# Patient Record
Sex: FEMALE | Race: BLACK OR AFRICAN AMERICAN | NOT HISPANIC OR LATINO | Employment: UNEMPLOYED | URBAN - METROPOLITAN AREA
[De-identification: names, ages, dates, MRNs, and addresses within clinical notes are randomized per-mention and may not be internally consistent; named-entity substitution may affect disease eponyms.]

---
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
## 2021-05-07 ENCOUNTER — HOSPITAL ENCOUNTER (EMERGENCY)
Facility: HOSPITAL | Age: 21
Discharge: HOME | End: 2021-05-07
Attending: EMERGENCY MEDICINE | Admitting: EMERGENCY MEDICINE
Payer: COMMERCIAL

## 2021-05-07 ENCOUNTER — APPOINTMENT (EMERGENCY)
Dept: RADIOLOGY | Facility: HOSPITAL | Age: 21
End: 2021-05-07
Attending: EMERGENCY MEDICINE
Payer: COMMERCIAL

## 2021-05-07 ENCOUNTER — OFFICE VISIT (OUTPATIENT)
Dept: FAMILY MEDICINE CLINIC | Facility: CLINIC | Age: 21
End: 2021-05-07
Payer: COMMERCIAL

## 2021-05-07 VITALS
SYSTOLIC BLOOD PRESSURE: 119 MMHG | DIASTOLIC BLOOD PRESSURE: 58 MMHG | TEMPERATURE: 98.8 F | HEART RATE: 87 BPM | RESPIRATION RATE: 16 BRPM | OXYGEN SATURATION: 100 %

## 2021-05-07 VITALS
BODY MASS INDEX: 18.61 KG/M2 | TEMPERATURE: 97 F | SYSTOLIC BLOOD PRESSURE: 118 MMHG | HEART RATE: 70 BPM | RESPIRATION RATE: 14 BRPM | HEIGHT: 64 IN | DIASTOLIC BLOOD PRESSURE: 70 MMHG | OXYGEN SATURATION: 96 % | WEIGHT: 109 LBS

## 2021-05-07 DIAGNOSIS — Z00.00 ANNUAL PHYSICAL EXAM: Primary | ICD-10-CM

## 2021-05-07 DIAGNOSIS — D64.9 ANEMIA: ICD-10-CM

## 2021-05-07 DIAGNOSIS — R07.81 RIB PAIN ON RIGHT SIDE: Primary | ICD-10-CM

## 2021-05-07 DIAGNOSIS — Z11.3 SCREENING FOR STD (SEXUALLY TRANSMITTED DISEASE): ICD-10-CM

## 2021-05-07 DIAGNOSIS — R07.81 RIB PAIN ON RIGHT SIDE: ICD-10-CM

## 2021-05-07 LAB
ALBUMIN SERPL BCP-MCNC: 3.6 G/DL (ref 3.5–5)
ALP SERPL-CCNC: 49 U/L (ref 46–116)
ALT SERPL W P-5'-P-CCNC: 28 U/L (ref 12–78)
ANION GAP SERPL CALCULATED.3IONS-SCNC: 10 MMOL/L (ref 4–13)
AST SERPL W P-5'-P-CCNC: 32 U/L (ref 5–45)
BASOPHILS # BLD AUTO: 0.06 THOUSANDS/ΜL (ref 0–0.1)
BASOPHILS NFR BLD AUTO: 1 % (ref 0–1)
BILIRUB SERPL-MCNC: 0.24 MG/DL (ref 0.2–1)
BUN SERPL-MCNC: 10 MG/DL (ref 5–25)
CALCIUM SERPL-MCNC: 8.5 MG/DL (ref 8.3–10.1)
CHLORIDE SERPL-SCNC: 108 MMOL/L (ref 100–108)
CO2 SERPL-SCNC: 25 MMOL/L (ref 21–32)
CREAT SERPL-MCNC: 0.75 MG/DL (ref 0.6–1.3)
D DIMER PPP FEU-MCNC: <0.27 UG/ML FEU
EOSINOPHIL # BLD AUTO: 0.28 THOUSAND/ΜL (ref 0–0.61)
EOSINOPHIL NFR BLD AUTO: 5 % (ref 0–6)
ERYTHROCYTE [DISTWIDTH] IN BLOOD BY AUTOMATED COUNT: 19.4 % (ref 11.6–15.1)
GFR SERPL CREATININE-BSD FRML MDRD: 133 ML/MIN/1.73SQ M
GLUCOSE SERPL-MCNC: 89 MG/DL (ref 65–140)
HCT VFR BLD AUTO: 31.1 % (ref 34.8–46.1)
HGB BLD-MCNC: 8.5 G/DL (ref 11.5–15.4)
IMM GRANULOCYTES # BLD AUTO: 0.01 THOUSAND/UL (ref 0–0.2)
IMM GRANULOCYTES NFR BLD AUTO: 0 % (ref 0–2)
LYMPHOCYTES # BLD AUTO: 2.32 THOUSANDS/ΜL (ref 0.6–4.47)
LYMPHOCYTES NFR BLD AUTO: 41 % (ref 14–44)
MCH RBC QN AUTO: 18.6 PG (ref 26.8–34.3)
MCHC RBC AUTO-ENTMCNC: 27.3 G/DL (ref 31.4–37.4)
MCV RBC AUTO: 68 FL (ref 82–98)
MONOCYTES # BLD AUTO: 0.78 THOUSAND/ΜL (ref 0.17–1.22)
MONOCYTES NFR BLD AUTO: 14 % (ref 4–12)
NEUTROPHILS # BLD AUTO: 2.17 THOUSANDS/ΜL (ref 1.85–7.62)
NEUTS SEG NFR BLD AUTO: 39 % (ref 43–75)
NRBC BLD AUTO-RTO: 0 /100 WBCS
PLATELET # BLD AUTO: 509 THOUSANDS/UL (ref 149–390)
PMV BLD AUTO: 9.7 FL (ref 8.9–12.7)
POTASSIUM SERPL-SCNC: 4.4 MMOL/L (ref 3.5–5.3)
PROT SERPL-MCNC: 7.2 G/DL (ref 6.4–8.2)
RBC # BLD AUTO: 4.56 MILLION/UL (ref 3.81–5.12)
SODIUM SERPL-SCNC: 143 MMOL/L (ref 136–145)
WBC # BLD AUTO: 5.62 THOUSAND/UL (ref 4.31–10.16)

## 2021-05-07 PROCEDURE — 80053 COMPREHEN METABOLIC PANEL: CPT | Performed by: EMERGENCY MEDICINE

## 2021-05-07 PROCEDURE — 85025 COMPLETE CBC W/AUTO DIFF WBC: CPT | Performed by: EMERGENCY MEDICINE

## 2021-05-07 PROCEDURE — 85379 FIBRIN DEGRADATION QUANT: CPT | Performed by: EMERGENCY MEDICINE

## 2021-05-07 PROCEDURE — 71101 X-RAY EXAM UNILAT RIBS/CHEST: CPT

## 2021-05-07 PROCEDURE — 99395 PREV VISIT EST AGE 18-39: CPT | Performed by: FAMILY MEDICINE

## 2021-05-07 PROCEDURE — 99285 EMERGENCY DEPT VISIT HI MDM: CPT | Performed by: EMERGENCY MEDICINE

## 2021-05-07 PROCEDURE — 3008F BODY MASS INDEX DOCD: CPT | Performed by: FAMILY MEDICINE

## 2021-05-07 PROCEDURE — 99284 EMERGENCY DEPT VISIT MOD MDM: CPT

## 2021-05-07 PROCEDURE — 1036F TOBACCO NON-USER: CPT | Performed by: FAMILY MEDICINE

## 2021-05-07 PROCEDURE — 36415 COLL VENOUS BLD VENIPUNCTURE: CPT | Performed by: EMERGENCY MEDICINE

## 2021-05-07 RX ORDER — NAPROXEN 500 MG/1
500 TABLET ORAL ONCE
Status: COMPLETED | OUTPATIENT
Start: 2021-05-07 | End: 2021-05-07

## 2021-05-07 RX ORDER — NAPROXEN 500 MG/1
500 TABLET ORAL 2 TIMES DAILY WITH MEALS
Qty: 10 TABLET | Refills: 0 | Status: SHIPPED | OUTPATIENT
Start: 2021-05-07 | End: 2022-06-03 | Stop reason: ALTCHOICE

## 2021-05-07 RX ORDER — FERROUS SULFATE 325(65) MG
325 TABLET ORAL DAILY
Qty: 30 TABLET | Refills: 0 | Status: SHIPPED | OUTPATIENT
Start: 2021-05-07 | End: 2022-06-03 | Stop reason: ALTCHOICE

## 2021-05-07 RX ORDER — FERROUS SULFATE 325(65) MG
325 TABLET ORAL
Status: DISCONTINUED | OUTPATIENT
Start: 2021-05-08 | End: 2021-05-07 | Stop reason: HOSPADM

## 2021-05-07 RX ADMIN — FERROUS SULFATE TAB 325 MG (65 MG ELEMENTAL FE) 325 MG: 325 (65 FE) TAB at 21:37

## 2021-05-07 RX ADMIN — NAPROXEN 500 MG: 500 TABLET ORAL at 21:37

## 2021-05-07 NOTE — PROGRESS NOTES
[address] [facility]    NAME: Ambika Fong  AGE: 21 y o  SEX: female  : 2000     DATE: 2021      Assessment and Plan:     Problem List Items Addressed This Visit     None      Visit Diagnoses     Annual physical exam    -  Primary    Screening for STD (sexually transmitted disease)        Relevant Orders    RPR    HIV 1/2 Antigen/Antibody (4th Generation) w Reflex [facility]    Chlamydia/GC amplified DNA by PCR    Rib pain on right side        Relevant Orders    XR ribs right w pa chest min 3 views          Immunizations and preventive care screenings were discussed with patient today  Appropriate education was printed on patient's after visit summary  Counseling:  Alcohol/drug use: discussed moderation in alcohol intake, the recommendations for healthy alcohol use, and avoidance of illicit drug use  Dental Health: discussed importance of regular tooth brushing, flossing, and dental visits  Injury prevention: discussed safety/seat belts, safety helmets, smoke detectors, carbon dioxide detectors, and smoking near bedding or upholstery  Sexual health: discussed sexually transmitted diseases, partner selection, use of condoms, avoidance of unintended pregnancy, and contraceptive alternatives  · Exercise: the importance of regular exercise/physical activity was discussed  Recommend exercise 3-5 times per week for at least 30 minutes  No follow-ups on file  Chief Complaint:     Chief Complaint   Patient presents with    Annual Exam     Has not been seen in office for a few years  No concerns  History of Present Illness:     Adult Annual Physical   Patient here for a comprehensive physical exam  The patient reports having mahmood  Diet and Physical Activity  · Diet/Nutrition: well balanced diet  · Exercise: no formal exercise        Depression Screening  PHQ-9 Depression Screening    PHQ-9:   Frequency of the following problems over the past two weeks:           General Health  · Sleep: gets 4-6 hours of sleep on average  · Hearing: normal - bilateral   · Vision: vision problems: wears glasses      · Dental: brushes teeth twice daily and does not floss  Work-   [employer]  · Last menstrual period: 5/1/21  · Sexual Active- bisexual  · Contraceptive method: none  · History of STDs?: no      Review of Systems:     Review of Systems   Constitutional: Negative for chills, fatigue and fever  HENT: Negative for congestion, ear pain and sore throat  Eyes: Negative for pain and visual disturbance  Respiratory: Negative for cough and shortness of breath  Cardiovascular: Negative for chest pain and palpitations  Gastrointestinal: Negative for abdominal pain and vomiting  Genitourinary: Negative for dysuria and hematuria  Musculoskeletal: Negative for arthralgias and back pain  Skin: Negative for color change and rash  Neurological: Negative for seizures and syncope  All other systems reviewed and are negative  Past Medical History:     Past Medical History:   Diagnosis Date    Asthma       Past Surgical History:     History reviewed  No pertinent surgical history     Social History:        Social History     Socioeconomic History    Marital status: Single     Spouse name: None    Number of children: None    Years of education: None    Highest education level: None   Occupational History    None   Social Needs    Financial resource strain: None    Food insecurity     Worry: None     Inability: None    Transportation needs     Medical: None     Non-medical: None   Tobacco Use    Smoking status: Never Smoker    Smokeless tobacco: Never Used   Substance and Sexual Activity    Alcohol use: No    Drug use: No    Sexual activity: Yes   Lifestyle    Physical activity     Days per week: None     Minutes per session: None    Stress: None   Relationships    Social connections     Talks on phone: None Gets together: None     Attends Gnosticism service: None     Active member of club or organization: None     Attends meetings of clubs or organizations: None     Relationship status: None    Intimate partner violence     Fear of current or ex partner: None     Emotionally abused: None     Physically abused: None     Forced sexual activity: None   Other Topics Concern    None   Social History Narrative    None      Family History:     History reviewed  No pertinent family history  Current Medications:     Current Outpatient Medications   Medication Sig Dispense Refill    albuterol (PROVENTIL HFA,VENTOLIN HFA) 90 mcg/act inhaler Inhale 2 puffs every 6 (six) hours as needed for wheezing or shortness of breath  (Patient not taking: Reported on 5/7/2021) 1 Inhaler 0     No current facility-administered medications for this visit  Allergies: Allergies   Allergen Reactions    Other      seasonal      Physical Exam:     /70 (BP Location: Left arm, Patient Position: Sitting, Cuff Size: Standard)   Pulse 70   Temp (!) 97 °F (36 1 °C) (Tympanic)   Resp 14   Ht 5' 4" (1 626 m)   Wt 49 4 kg (109 lb)   LMP 05/01/2021 (LMP Unknown)   SpO2 96%   BMI 18 71 kg/m²     Physical Exam  Vitals signs and nursing note reviewed  Constitutional:       General: She is not in acute distress  Appearance: She is well-developed  HENT:      Head: Normocephalic and atraumatic  Right Ear: Tympanic membrane, ear canal and external ear normal       Left Ear: Tympanic membrane, ear canal and external ear normal       Nose: Nose normal       Mouth/Throat:      Mouth: Mucous membranes are moist    Eyes:      Extraocular Movements: Extraocular movements intact  Conjunctiva/sclera: Conjunctivae normal       Pupils: Pupils are equal, round, and reactive to light  Neck:      Musculoskeletal: Neck supple  Cardiovascular:      Rate and Rhythm: Normal rate and regular rhythm  Heart sounds: No murmur  Pulmonary:      Effort: Pulmonary effort is normal  No respiratory distress  Breath sounds: Normal breath sounds  Abdominal:      Palpations: Abdomen is soft  Tenderness: There is no abdominal tenderness  Musculoskeletal: Normal range of motion  Comments: Tenderness to palpation at rib2 substernal    Skin:     General: Skin is warm and dry  Capillary Refill: Capillary refill takes less than 2 seconds  Neurological:      General: No focal deficit present  Mental Status: She is alert and oriented to person, place, and time     Psychiatric:         Mood and Affect: Mood normal          Behavior: Behavior normal             Lindsey Renee DO   [address]

## 2021-05-07 NOTE — ED PROVIDER NOTES
History  Chief Complaint   Patient presents with    Rib Pain     c/o painful lump to anterior chest for a couple of weeks  sent in by [facility] for xray  states sometimes hard to breathe with it     22 yo female c/o lump of right anterior chest wall  She says she noticed it one week ago  The area is painful   + associated sob with movement/deep breathing  Sent by [facility] for [facility] she says  No fever, cough, vomiting, diarrhea  No trauma  History provided by:  Patient   used: No        Prior to Admission Medications   Prescriptions Last Dose Informant Patient Reported? Taking? albuterol (PROVENTIL HFA,VENTOLIN HFA) 90 mcg/act inhaler   No No   Sig: Inhale 2 puffs every 6 (six) hours as needed for wheezing or shortness of breath  Patient not taking: Reported on 5/7/2021      Facility-Administered Medications: None       Past Medical History:   Diagnosis Date    Asthma        History reviewed  No pertinent surgical history  History reviewed  No pertinent family history  I have reviewed and agree with the history as documented  E-Cigarette/Vaping    E-Cigarette Use Never User      E-Cigarette/Vaping Substances     Social History     Tobacco Use    Smoking status: Never Smoker    Smokeless tobacco: Never Used   Substance Use Topics    Alcohol use: No    Drug use: No       Review of Systems   Constitutional: Negative  Negative for fever  HENT: Negative  Eyes: Negative  Respiratory: Positive for shortness of breath  Negative for cough  Cardiovascular: Positive for chest pain  Gastrointestinal: Negative  Negative for abdominal pain, diarrhea, nausea and vomiting  Genitourinary: Negative  Negative for dysuria and flank pain  Musculoskeletal: Negative  Negative for back pain and myalgias  Skin: Negative  Negative for rash  Neurological: Negative  Negative for dizziness and headaches  Hematological: Does not bruise/bleed easily     Psychiatric/Behavioral: Negative  All other systems reviewed and are negative  Physical Exam  Physical Exam  Vitals signs and nursing note reviewed  Constitutional:       General: She is not in acute distress  Appearance: She is well-developed  She is not ill-appearing, toxic-appearing or diaphoretic  HENT:      Head: Normocephalic and atraumatic  Eyes:      Conjunctiva/sclera: Conjunctivae normal    Neck:      Musculoskeletal: Normal range of motion and neck supple  Cardiovascular:      Rate and Rhythm: Normal rate and regular rhythm  Heart sounds: Normal heart sounds  No murmur  Pulmonary:      Effort: Pulmonary effort is normal  No respiratory distress  Breath sounds: Normal breath sounds  Comments: Right upper anterior chest wall about 2 fingerwidths below clavicle there is a firm lump over the rib, not red/warm/abscessed, the area is tender to palp, there is a definite asymmetry to the left side  Chest:      Chest wall: Tenderness present  Abdominal:      General: Bowel sounds are normal  There is no distension  Palpations: Abdomen is soft  Tenderness: There is no abdominal tenderness  Musculoskeletal: Normal range of motion  General: No tenderness or deformity  Right lower leg: No edema  Left lower leg: No edema  Skin:     General: Skin is warm and dry  Coloration: Skin is not pale  Findings: No rash  Neurological:      General: No focal deficit present  Mental Status: She is alert and oriented to person, place, and time  Cranial Nerves: No cranial nerve deficit     Psychiatric:         Mood and Affect: Mood normal          Behavior: Behavior normal          Vital Signs  ED Triage Vitals [05/07/21 1748]   Temperature Pulse Respirations Blood Pressure SpO2   98 8 °F (37 1 °C) 87 16 119/58 100 %      Temp Source Heart Rate Source Patient Position - Orthostatic VS BP Location FiO2 (%)   Tympanic Monitor Sitting Right arm --      Pain Score No Pain           Vitals:    05/07/21 1748   BP: 119/58   Pulse: 87   Patient Position - Orthostatic VS: Sitting         Visual Acuity      ED Medications  Medications   naproxen (NAPROSYN) tablet 500 mg (has no administration in time range)   ferrous sulfate tablet 325 mg (has no administration in time range)       Diagnostic Studies  Results Reviewed     Procedure Component Value Units Date/Time    D-Dimer [[id number]]  (Normal) Collected: 05/07/21 1923    Lab Status: Final result Specimen: Blood from Arm, Left Updated: 05/07/21 1943     D-Dimer, Quant <0 27 ug/ml [facility]     Comprehensive metabolic panel [[id number]] Collected: 05/07/21 1850    Lab Status: Final result Specimen: Blood from Arm, Left Updated: 05/07/21 1909     Sodium 143 mmol/L      Potassium 4 4 mmol/L      Chloride 108 mmol/L      CO2 25 mmol/L      ANION GAP 10 mmol/L      BUN 10 mg/dL      Creatinine 0 75 mg/dL      Glucose 89 mg/dL      Calcium 8 5 mg/dL      AST 32 U/L      ALT 28 U/L      Alkaline Phosphatase 49 U/L      Total Protein 7 2 g/dL      Albumin 3 6 g/dL      Total Bilirubin 0 24 mg/dL      eGFR 133 ml/min/1 73sq m     Narrative:      [place] guidelines for Chronic Kidney Disease (CKD):     Stage 1 with normal or high GFR (GFR > 90 mL/min/1 73 square meters)    Stage 2 Mild CKD (GFR = 60-89 mL/min/1 73 square meters)    Stage 3A Moderate CKD (GFR = 45-59 mL/min/1 73 square meters)    Stage 3B Moderate CKD (GFR = 30-44 mL/min/1 73 square meters)    Stage 4 Severe CKD (GFR = 15-29 mL/min/1 73 square meters)    Stage 5 End Stage CKD (GFR <15 mL/min/1 73 square meters)  Note: GFR calculation is accurate only with a steady state creatinine    CBC and differential [[id number]]  (Abnormal) Collected: 05/07/21 1850    Lab Status: Final result Specimen: Blood from Arm, Left Updated: 05/07/21 1854     WBC 5 62 Thousand/uL      RBC 4 56 Million/uL      Hemoglobin 8 5 g/dL      Hematocrit 31 1 %      MCV 68 fL MCH 18 6 pg      MCHC 27 3 g/dL      RDW 19 4 %      MPV 9 7 fL      Platelets 646 Thousands/uL      nRBC 0 /100 WBCs      Neutrophils Relative 39 %      Immat GRANS % 0 %      Lymphocytes Relative 41 %      Monocytes Relative 14 %      Eosinophils Relative 5 %      Basophils Relative 1 %      Neutrophils Absolute 2 17 Thousands/µL      Immature Grans Absolute 0 01 Thousand/uL      Lymphocytes Absolute 2 32 Thousands/µL      Monocytes Absolute 0 78 Thousand/µL      Eosinophils Absolute 0 28 Thousand/µL      Basophils Absolute 0 06 Thousands/µL                  XR ribs with pa chest min 3 views RIGHT   ED Interpretation by Kevyn Felton MD (13/35 1166)   negative                 Procedures  Procedures         ED Course                                           MDM  Number of Diagnoses or Management Options  Anemia:   Rib pain on right side:   Diagnosis management comments: Labs show anemia which is new from bloodwork a couple years ago  Pt  Advised and prescribed iron supplement  Pt  Denies bleeding anywhere, no heavy periods, blood in stool  Will try naprosyn and heating pad for swollen painful area over rib  Disposition  Final diagnoses:   Rib pain on right side   Anemia     Time reflects when diagnosis was documented in both MDM as applicable and the Disposition within this note     Time User Action Codes Description Comment    5/3/8968  7:70 PM Em PATHAK Add [B10 46] Rib pain on right side     0/3/2500  2:86 PM Rekha Ly Add [M63 9] Anemia       ED Disposition     ED Disposition Condition Date/Time Comment    Discharge Stable Fri May 7, 2021  8:58 PM Xochitl Muniz discharge to home/self care              Follow-up Information    None         Patient's Medications   Discharge Prescriptions    FERROUS SULFATE 325 (65 FE) MG TABLET    Take 1 tablet (325 mg total) by mouth daily       Start Date: 5/7/2021  End Date: --       Order Dose: 325 mg       Quantity: 30 tablet    Refills: 0 NAPROXEN (NAPROSYN) 500 MG TABLET    Take 1 tablet (500 mg total) by mouth 2 (two) times a day with meals       Start Date: 5/7/2021  End Date: --       Order Dose: 500 mg       Quantity: 10 tablet    Refills: 0     No discharge procedures on file      PDMP Review     None          ED Provider  Electronically Signed by           Carlos Rizzo MD  87/91/40 1980

## 2021-05-07 NOTE — PATIENT INSTRUCTIONS
Call [facility] for EYE Exam    [facility] [address], [place] [address]  [phone]    Call Radiology regarding Xray  PCP will call back with results

## 2021-05-08 LAB
C TRACH RRNA SPEC QL NAA+PROBE: NEGATIVE
N GONORRHOEA RRNA SPEC QL NAA+PROBE: NEGATIVE

## 2021-05-08 NOTE — DISCHARGE INSTRUCTIONS
Your blood work shows anemia  You need to take the iron supplement as prescribed and follow up with [facility] again next week - the iron can cause constipation so keep hydrated and use an over the counter stool softener if needed  The xrays of your lungs and ribs are normal   There may be inflammation of the rib cartilage and/or muscle  Use a heating pad off and on and take the naprosyn as prescribed

## 2021-06-03 ENCOUNTER — CLINICAL SUPPORT (OUTPATIENT)
Dept: FAMILY MEDICINE CLINIC | Facility: CLINIC | Age: 21
End: 2021-06-03
Payer: COMMERCIAL

## 2021-06-03 DIAGNOSIS — N91.2 AMENORRHEA: Primary | ICD-10-CM

## 2021-06-03 LAB — SL AMB POCT URINE HCG: POSITIVE

## 2021-06-03 PROCEDURE — 81025 URINE PREGNANCY TEST: CPT

## 2021-06-03 NOTE — PATIENT INSTRUCTIONS
OB intake form completed and given to [facility], copy placed for scanning as well  Patient advised to make an appointment for initial OB visit and that Milagro will reach out to her for intake  Patient verbalized understanding

## 2021-06-04 ENCOUNTER — HOSPITAL ENCOUNTER (EMERGENCY)
Facility: HOSPITAL | Age: 21
Discharge: HOME | End: 2021-06-05
Attending: EMERGENCY MEDICINE | Admitting: EMERGENCY MEDICINE
Payer: COMMERCIAL

## 2021-06-04 VITALS
OXYGEN SATURATION: 99 % | RESPIRATION RATE: 16 BRPM | DIASTOLIC BLOOD PRESSURE: 58 MMHG | HEART RATE: 87 BPM | WEIGHT: 110 LBS | SYSTOLIC BLOOD PRESSURE: 117 MMHG | TEMPERATURE: 99.1 F | BODY MASS INDEX: 18.88 KG/M2

## 2021-06-04 DIAGNOSIS — Z34.90 EARLY STAGE OF PREGNANCY: Primary | ICD-10-CM

## 2021-06-04 LAB
ABO GROUP BLD: NORMAL
ALBUMIN SERPL BCP-MCNC: 4.1 G/DL (ref 3.5–5)
ALP SERPL-CCNC: 71 U/L (ref 46–116)
ALT SERPL W P-5'-P-CCNC: 104 U/L (ref 12–78)
ANION GAP SERPL CALCULATED.3IONS-SCNC: 9 MMOL/L (ref 4–13)
APTT PPP: 35 SECONDS (ref 23–37)
AST SERPL W P-5'-P-CCNC: 60 U/L (ref 5–45)
B-HCG SERPL-ACNC: 919 MIU/ML
BACTERIA UR QL AUTO: ABNORMAL /HPF
BASOPHILS # BLD AUTO: 0.1 THOUSANDS/ΜL (ref 0–0.1)
BASOPHILS NFR BLD AUTO: 1 % (ref 0–1)
BILIRUB SERPL-MCNC: 0.25 MG/DL (ref 0.2–1)
BILIRUB UR QL STRIP: NEGATIVE
BUN SERPL-MCNC: 10 MG/DL (ref 5–25)
CALCIUM SERPL-MCNC: 9.1 MG/DL (ref 8.3–10.1)
CHLORIDE SERPL-SCNC: 104 MMOL/L (ref 100–108)
CLARITY UR: ABNORMAL
CO2 SERPL-SCNC: 27 MMOL/L (ref 21–32)
COLOR UR: YELLOW
CREAT SERPL-MCNC: 0.81 MG/DL (ref 0.6–1.3)
EOSINOPHIL # BLD AUTO: 2.08 THOUSAND/ΜL (ref 0–0.61)
EOSINOPHIL NFR BLD AUTO: 23 % (ref 0–6)
ERYTHROCYTE [DISTWIDTH] IN BLOOD BY AUTOMATED COUNT: 26.2 % (ref 11.6–15.1)
EXT PREG TEST URINE: POSITIVE
EXT. CONTROL ED NAV: NORMAL
GFR SERPL CREATININE-BSD FRML MDRD: 121 ML/MIN/1.73SQ M
GLUCOSE SERPL-MCNC: 78 MG/DL (ref 65–140)
GLUCOSE UR STRIP-MCNC: NEGATIVE MG/DL
HCT VFR BLD AUTO: 35.7 % (ref 34.8–46.1)
HGB BLD-MCNC: 10.3 G/DL (ref 11.5–15.4)
HGB UR QL STRIP.AUTO: NEGATIVE
IMM GRANULOCYTES # BLD AUTO: 0.02 THOUSAND/UL (ref 0–0.2)
IMM GRANULOCYTES NFR BLD AUTO: 0 % (ref 0–2)
INR PPP: 1 (ref 0.84–1.19)
KETONES UR STRIP-MCNC: NEGATIVE MG/DL
LEUKOCYTE ESTERASE UR QL STRIP: ABNORMAL
LYMPHOCYTES # BLD AUTO: 3.02 THOUSANDS/ΜL (ref 0.6–4.47)
LYMPHOCYTES NFR BLD AUTO: 34 % (ref 14–44)
MCH RBC QN AUTO: 20.2 PG (ref 26.8–34.3)
MCHC RBC AUTO-ENTMCNC: 28.9 G/DL (ref 31.4–37.4)
MCV RBC AUTO: 70 FL (ref 82–98)
MONOCYTES # BLD AUTO: 0.82 THOUSAND/ΜL (ref 0.17–1.22)
MONOCYTES NFR BLD AUTO: 9 % (ref 4–12)
MUCOUS THREADS UR QL AUTO: ABNORMAL
NEUTROPHILS # BLD AUTO: 2.95 THOUSANDS/ΜL (ref 1.85–7.62)
NEUTS SEG NFR BLD AUTO: 33 % (ref 43–75)
NITRITE UR QL STRIP: NEGATIVE
NON-SQ EPI CELLS URNS QL MICRO: ABNORMAL /HPF
NRBC BLD AUTO-RTO: 0 /100 WBCS
PH UR STRIP.AUTO: 6 [PH]
PLATELET # BLD AUTO: 515 THOUSANDS/UL (ref 149–390)
PMV BLD AUTO: 9.3 FL (ref 8.9–12.7)
POTASSIUM SERPL-SCNC: 3.7 MMOL/L (ref 3.5–5.3)
PROT SERPL-MCNC: 8.3 G/DL (ref 6.4–8.2)
PROT UR STRIP-MCNC: NEGATIVE MG/DL
PROTHROMBIN TIME: 13.1 SECONDS (ref 11.6–14.5)
RBC # BLD AUTO: 5.09 MILLION/UL (ref 3.81–5.12)
RBC #/AREA URNS AUTO: ABNORMAL /HPF
RH BLD: POSITIVE
SODIUM SERPL-SCNC: 140 MMOL/L (ref 136–145)
SP GR UR STRIP.AUTO: >=1.03 (ref 1–1.03)
UROBILINOGEN UR QL STRIP.AUTO: 4 E.U./DL
WBC # BLD AUTO: 8.99 THOUSAND/UL (ref 4.31–10.16)
WBC #/AREA URNS AUTO: ABNORMAL /HPF

## 2021-06-04 PROCEDURE — 81001 URINALYSIS AUTO W/SCOPE: CPT | Performed by: EMERGENCY MEDICINE

## 2021-06-04 PROCEDURE — 99284 EMERGENCY DEPT VISIT MOD MDM: CPT | Performed by: EMERGENCY MEDICINE

## 2021-06-04 PROCEDURE — 36415 COLL VENOUS BLD VENIPUNCTURE: CPT | Performed by: EMERGENCY MEDICINE

## 2021-06-04 PROCEDURE — 99284 EMERGENCY DEPT VISIT MOD MDM: CPT

## 2021-06-04 PROCEDURE — 85610 PROTHROMBIN TIME: CPT | Performed by: EMERGENCY MEDICINE

## 2021-06-04 PROCEDURE — 84702 CHORIONIC GONADOTROPIN TEST: CPT | Performed by: EMERGENCY MEDICINE

## 2021-06-04 PROCEDURE — 87086 URINE CULTURE/COLONY COUNT: CPT | Performed by: EMERGENCY MEDICINE

## 2021-06-04 PROCEDURE — 81025 URINE PREGNANCY TEST: CPT | Performed by: EMERGENCY MEDICINE

## 2021-06-04 PROCEDURE — 80053 COMPREHEN METABOLIC PANEL: CPT | Performed by: EMERGENCY MEDICINE

## 2021-06-04 PROCEDURE — 86900 BLOOD TYPING SEROLOGIC ABO: CPT | Performed by: EMERGENCY MEDICINE

## 2021-06-04 PROCEDURE — 85025 COMPLETE CBC W/AUTO DIFF WBC: CPT | Performed by: EMERGENCY MEDICINE

## 2021-06-04 PROCEDURE — 86901 BLOOD TYPING SEROLOGIC RH(D): CPT | Performed by: EMERGENCY MEDICINE

## 2021-06-04 PROCEDURE — 85730 THROMBOPLASTIN TIME PARTIAL: CPT | Performed by: EMERGENCY MEDICINE

## 2021-06-05 ENCOUNTER — APPOINTMENT (EMERGENCY)
Dept: RADIOLOGY | Facility: HOSPITAL | Age: 21
End: 2021-06-05
Payer: COMMERCIAL

## 2021-06-05 PROCEDURE — 76801 OB US < 14 WKS SINGLE FETUS: CPT

## 2021-06-05 NOTE — ED PROVIDER NOTES
History  Chief Complaint   Patient presents with    Abdominal Pain Pregnant     just did positive pregancy test a day or two ago  started with general abd pain today  no bleeding g30     80-year-old female, past medical history significant for mild intermittent asthma, presenting with 2 day history of abdominal pain in the setting of a positive pregnancy test 2 days ago, denies any vaginal bleeding  No vaginal discharge  No urinary symptoms  No back or flank pain  No fever or chills  Denies any nausea or vomiting  She is now , LMP 5  History provided by:  Patient   used: No        Prior to Admission Medications   Prescriptions Last Dose Informant Patient Reported? Taking? albuterol (PROVENTIL HFA,VENTOLIN HFA) 90 mcg/act inhaler   No No   Sig: Inhale 2 puffs every 6 (six) hours as needed for wheezing or shortness of breath  Patient not taking: Reported on 2021   ferrous sulfate 325 (65 Fe) mg tablet   No No   Sig: Take 1 tablet (325 mg total) by mouth daily   naproxen (NAPROSYN) 500 mg tablet Not Taking at Unknown time  No No   Sig: Take 1 tablet (500 mg total) by mouth 2 (two) times a day with meals   Patient not taking: Reported on 2021      Facility-Administered Medications: None       Past Medical History:   Diagnosis Date    Asthma        History reviewed  No pertinent surgical history  History reviewed  No pertinent family history  I have reviewed and agree with the history as documented  E-Cigarette/Vaping    E-Cigarette Use Never User      E-Cigarette/Vaping Substances     Social History     Tobacco Use    Smoking status: Never Smoker    Smokeless tobacco: Never Used   Substance Use Topics    Alcohol use: No    Drug use: No       Review of Systems   Constitutional: Negative for chills and fever  HENT: Negative for sore throat  Eyes: Negative for visual disturbance  Respiratory: Negative for cough, chest tightness and shortness of breath  Cardiovascular: Negative for chest pain and leg swelling  Gastrointestinal: Positive for abdominal pain  Negative for abdominal distention, blood in stool, nausea and vomiting  Genitourinary: Positive for menstrual problem and pelvic pain  Negative for difficulty urinating, dysuria, flank pain, frequency, hematuria, vaginal bleeding and vaginal discharge  Missed period, LMP 5/1   Musculoskeletal: Negative for back pain  Skin: Negative for pallor and wound  Neurological: Negative for dizziness and headaches  Psychiatric/Behavioral: The patient is nervous/anxious  Physical Exam  Physical Exam  Vitals signs and nursing note reviewed  Constitutional:       General: She is not in acute distress  Appearance: Normal appearance  She is well-developed  HENT:      Head: Normocephalic and atraumatic  Nose: Nose normal       Mouth/Throat:      Mouth: Mucous membranes are moist    Eyes:      General: No scleral icterus  Extraocular Movements: Extraocular movements intact  Neck:      Musculoskeletal: Normal range of motion and neck supple  Cardiovascular:      Rate and Rhythm: Normal rate and regular rhythm  Pulmonary:      Effort: Pulmonary effort is normal       Breath sounds: Normal breath sounds  Abdominal:      General: There is no distension  Palpations: Abdomen is soft  There is no mass  Tenderness: There is abdominal tenderness  There is no right CVA tenderness, left CVA tenderness, guarding or rebound  Hernia: No hernia is present  Comments: Mild suprapubic ttp   Musculoskeletal: Normal range of motion  Skin:     General: Skin is warm and dry  Capillary Refill: Capillary refill takes less than 2 seconds  Neurological:      General: No focal deficit present  Mental Status: She is alert and oriented to person, place, and time     Psychiatric:      Comments: anxious         Vital Signs  ED Triage Vitals [06/04/21 2155]   Temperature Pulse Respirations Blood Pressure SpO2   99 1 °F (37 3 °C) 87 16 117/58 99 %      Temp Source Heart Rate Source Patient Position - Orthostatic VS BP Location FiO2 (%)   Tympanic Monitor Sitting Right arm --      Pain Score       Worst Possible Pain           Vitals:    06/04/21 2155   BP: 117/58   Pulse: 87   Patient Position - Orthostatic VS: Sitting         Visual Acuity      ED Medications  Medications - No data to display    Diagnostic Studies  Results Reviewed     Procedure Component Value Units Date/Time    hCG, quantitative [[id number]]  (Abnormal) Collected: 06/04/21 2231    Lab Status: Final result Specimen: Blood from Arm, Left Updated: 06/04/21 2259     HCG, Quant 919 mIU/mL     Narrative:       Expected Ranges:     Approximate               Approximate HCG  Gestation age          Concentration ( mIU/mL)  _____________          ______________________   [place]                      HCG values  0 2-1                       5-50  1-2                           2-3                         100-5000  3-4                         500-48070  4-5                         1000-50791  5-6                         11754-792435  6-8                         [id number]  8-12                        82764-230853      Comprehensive metabolic panel [[id number]]  (Abnormal) Collected: 06/04/21 2231    Lab Status: Final result Specimen: Blood from Arm, Left Updated: 06/04/21 2253     Sodium 140 mmol/L      Potassium 3 7 mmol/L      Chloride 104 mmol/L      CO2 27 mmol/L      ANION GAP 9 mmol/L      BUN 10 mg/dL      Creatinine 0 81 mg/dL      Glucose 78 mg/dL      Calcium 9 1 mg/dL      AST 60 U/L       U/L      Alkaline Phosphatase 71 U/L      Total Protein 8 3 g/dL      Albumin 4 1 g/dL      Total Bilirubin 0 25 mg/dL      eGFR 121 ml/min/1 73sq m     Narrative:      [facility] guidelines for Chronic Kidney Disease (CKD):     Stage 1 with normal or high GFR (GFR > 90 mL/min/1 73 square meters)   Stage 2 Mild CKD (GFR = 60-89 mL/min/1 73 square meters)    Stage 3A Moderate CKD (GFR = 45-59 mL/min/1 73 square meters)    Stage 3B Moderate CKD (GFR = 30-44 mL/min/1 73 square meters)    Stage 4 Severe CKD (GFR = 15-29 mL/min/1 73 square meters)    Stage 5 End Stage CKD (GFR <15 mL/min/1 73 square meters)  Note: GFR calculation is accurate only with a steady state creatinine    Urine Microscopic [[id number]]  (Abnormal) Collected: 06/04/21 2231    Lab Status: Final result Specimen: Urine, Clean Catch Updated: 06/04/21 2249     RBC, UA 0-1 /hpf      WBC, UA 10-20 /hpf      Epithelial Cells Occasional /hpf      Bacteria, UA Moderate /hpf      MUCUS THREADS Occasional     URINE COMMENT --    Protime-INR [[id number]]  (Normal) Collected: 06/04/21 2231    Lab Status: Final result Specimen: Blood from Arm, Left Updated: 06/04/21 2247     Protime 13 1 seconds      INR 1 00    APTT [[id number]]  (Normal) Collected: 06/04/21 2231    Lab Status: Final result Specimen: Blood from Arm, Left Updated: 06/04/21 2247     PTT 35 seconds     UA w Reflex to Microscopic w Reflex to Culture [[id number]]  (Abnormal) Collected: 06/04/21 2231    Lab Status: Final result Specimen: Urine, Clean Catch Updated: 06/04/21 2241     Color, UA Yellow     Clarity, UA Slightly Cloudy     Specific Gravity, UA >=1 030     pH, UA 6 0     Leukocytes, UA Small     Nitrite, UA Negative     Protein, UA Negative mg/dl      Glucose, UA Negative mg/dl      Ketones, UA Negative mg/dl      Urobilinogen, UA 4 0 E U /dl      Bilirubin, UA Negative     Blood, UA Negative     URINE COMMENT --    Urine culture [[id number]] Collected: 06/04/21 2231    Lab Status:  In process Specimen: Urine, Clean Catch Updated: 06/04/21 2241    CBC and differential [[id number]]  (Abnormal) Collected: 06/04/21 2231    Lab Status: Final result Specimen: Blood from Arm, Left Updated: 06/04/21 2237     WBC 8 99 Thousand/uL      RBC 5 09 Million/uL      Hemoglobin 10 3 g/dL Hematocrit 35 7 %      MCV 70 fL      MCH 20 2 pg      MCHC 28 9 g/dL      RDW 26 2 %      MPV 9 3 fL      Platelets 019 Thousands/uL      nRBC 0 /100 WBCs      Neutrophils Relative 33 %      Immat GRANS % 0 %      Lymphocytes Relative 34 %      Monocytes Relative 9 %      Eosinophils Relative 23 %      Basophils Relative 1 %      Neutrophils Absolute 2 95 Thousands/µL      Immature Grans Absolute 0 02 Thousand/uL      Lymphocytes Absolute 3 02 Thousands/µL      Monocytes Absolute 0 82 Thousand/µL      Eosinophils Absolute 2 08 Thousand/µL      Basophils Absolute 0 10 Thousands/µL     POCT pregnancy, urine [[id number]]  (Normal) Resulted: 21    Lab Status: Final result Updated: 21     EXT PREG TEST UR (Ref: Negative) Positive     Control Valid                 US OB < 14 weeks with transvaginal   Final Result by Lynette Swanson DO ( [id number])   No intrauterine gestation is seen  Differential considerations include early IUP, spontaneous  or ectopic pregnancy  Clinical correlation and correlation with serial beta-hCG measurements recommended  Suspected left ovarian corpus luteum, bilateral ovaries otherwise appear grossly unremarkable  Small amount of fluid in the pelvis, nonspecific  Other findings as above              Workstation performed: [id number]                    Procedures  Procedures         ED Course  ED Course as of 630   Fri 2021   2313 [address] paged      Sat 2021   0013 Pt to [address]                                                 MDM  Number of Diagnoses or Management Options  Early stage of pregnancy:   Diagnosis management comments: R/O Ectopic Pregnancy  Labs, BhCG, Pelvic US   Return in 48 hrs       Amount and/or Complexity of Data Reviewed  Clinical lab tests: ordered and reviewed  Tests in the radiology section of CPT®: ordered and reviewed  Tests in the medicine section of CPT®: reviewed and ordered  Decide to obtain previous medical records or to obtain history from someone other than the patient: yes  Review and summarize past medical records: yes  Independent visualization of images, tracings, or specimens: yes    Risk of Complications, Morbidity, and/or Mortality  Presenting problems: high  Diagnostic procedures: low  Management options: moderate    Patient Progress  Patient progress: stable      Disposition  Final diagnoses:   Early stage of pregnancy - R/o Ectopic Pregnancy     Time reflects when diagnosis was documented in both MDM as applicable and the Disposition within this note     Time User Action Codes Description Comment    6/5/2021  1:03 AM Urszula Teresa Add [Z34 90] Early stage of pregnancy     6/5/2021  1:03 AM Urszula Teresa Modify [Z34 90] Early stage of pregnancy R/o Ectopic Pregnancy      ED Disposition     ED Disposition Condition Date/Time Comment    Discharge Stable Sat Jun 5, 2021  1:03 AM Nicole Fong discharge to home/self care  Follow-up Information     Follow up With Specialties Details Why Contact Info Additional Information    [address] [facility] Emergency Department Emergency Medicine In 2 days If you cannot see GYN for reevaluation and repeat blood work  [address] [facility] [address]  [address] [address] Emergency Department, [facility], [address] [place] [address], [facility]    Bertha Crockett MD Obstetrics and Gynecology, Obstetrics, Gynecology In 2 days For Gyn evaluation and further management   [address]  [facility]  [phone]             Discharge Medication List as of 6/5/2021  1:06 AM      CONTINUE these medications which have NOT CHANGED    Details   albuterol (PROVENTIL HFA,VENTOLIN HFA) 90 mcg/act inhaler Inhale 2 puffs every 6 (six) hours as needed for wheezing or shortness of breath , Starting 9/22/2016, Until Discontinued, Print      ferrous sulfate 325 (65 Fe) mg tablet Take 1 tablet (325 mg total) by mouth daily, Starting Fri 5/7/2021, Normal      naproxen (NAPROSYN) 500 mg tablet Take 1 tablet (500 mg total) by mouth 2 (two) times a day with meals, Starting Fri 5/7/2021, Normal           No discharge procedures on file      PDMP Review     None          ED Provider  Electronically Signed by           Fabby Aragon MD  06/05/21 8736

## 2021-06-05 NOTE — DISCHARGE INSTRUCTIONS
We need to make sure that your pregnancy its not an ectopic pregnancy as discussed  It is important that you follow-up in 48 hours for repeat blood work as instructed

## 2021-06-07 LAB — BACTERIA UR CULT: NORMAL

## 2021-06-08 ENCOUNTER — TELEPHONE (OUTPATIENT)
Dept: FAMILY MEDICINE CLINIC | Facility: CLINIC | Age: 21
End: 2021-06-08

## 2021-07-02 ENCOUNTER — INITIAL PRENATAL (OUTPATIENT)
Dept: OBGYN CLINIC | Facility: CLINIC | Age: 21
End: 2021-07-02

## 2021-07-02 ENCOUNTER — APPOINTMENT (OUTPATIENT)
Dept: LAB | Facility: HOSPITAL | Age: 21
End: 2021-07-02
Payer: COMMERCIAL

## 2021-07-02 VITALS — WEIGHT: 114 LBS | SYSTOLIC BLOOD PRESSURE: 100 MMHG | DIASTOLIC BLOOD PRESSURE: 60 MMHG | BODY MASS INDEX: 19.57 KG/M2

## 2021-07-02 DIAGNOSIS — Z34.01 ENCOUNTER FOR SUPERVISION OF NORMAL FIRST PREGNANCY IN FIRST TRIMESTER: Primary | ICD-10-CM

## 2021-07-02 DIAGNOSIS — O02.0 BLIGHTED OVUM: ICD-10-CM

## 2021-07-02 PROCEDURE — PNV: Performed by: NURSE PRACTITIONER

## 2021-07-02 NOTE — PROGRESS NOTES
INITIAL OB VISIT: Pt presents as a new patient to our office after a positive home UPT  Unplanned pregnancy but welcomed  Medical History: Asthma, Anemia, Varicella as child  Denies any hx of MRSA  Surgical History: None    Medications: Prenatal with DHA, Albuterol inhaler, Iron supplement    Social History: Denies any alcohol, smoking, or drug use in pregnancy  Does NOT have cats  Has not been outside the country in the last 6 months  OB/GYN History: Menses monthly, regular LMP: 5/1/2021   Primip     TVUS: Gestational sac noted 2 56cm 7w2d based on size, no fetal pole or yolk sac identified     Denies any vomiting, HA, Cramping, VB, LOF, Edema, Domestic Violence, Smoking  No FM yet  Tolerating PNV  Some nausea  Did note a few days of light pink spotting end of May  GC/CT/Trich testing done  Pap deferred < 24years old  Discussed with patient and partner  Gestational sac seen by no fetus  Will correlate with Hcg quant and outside ultrasound  Reviewed early gestation vs anembryonic pregnancy  Discussed based on gestational sac mesuring 2 56 cm likely a blighted ovum  Options such as waiting for loss to occur naturally, cytotec vs D & E discussed  Pt unable to make discission at this time, would need to think about it  Will get Hcg quant labs drawn and call for ultrasound for confirmation

## 2021-07-04 LAB
C TRACH RRNA SPEC QL NAA+PROBE: NEGATIVE
N GONORRHOEA RRNA SPEC QL NAA+PROBE: NEGATIVE
T VAGINALIS RRNA SPEC QL NAA+PROBE: POSITIVE

## 2021-07-07 NOTE — RESULT ENCOUNTER NOTE
Spoke with patient informed script called into Pharmacy follow up in office if SX do not improve    MM CMA

## 2021-07-08 ENCOUNTER — HOSPITAL ENCOUNTER (OUTPATIENT)
Dept: RADIOLOGY | Facility: HOSPITAL | Age: 21
Discharge: HOME | End: 2021-07-08
Payer: COMMERCIAL

## 2021-07-08 ENCOUNTER — TELEPHONE (OUTPATIENT)
Dept: OBGYN CLINIC | Facility: CLINIC | Age: 21
End: 2021-07-08

## 2021-07-08 DIAGNOSIS — O02.0 BLIGHTED OVUM: ICD-10-CM

## 2021-07-08 DIAGNOSIS — Z34.01 ENCOUNTER FOR SUPERVISION OF NORMAL FIRST PREGNANCY IN FIRST TRIMESTER: ICD-10-CM

## 2021-07-08 PROCEDURE — 76815 OB US LIMITED FETUS(S): CPT

## 2021-07-23 ENCOUNTER — TELEPHONE (OUTPATIENT)
Dept: OBGYN CLINIC | Facility: CLINIC | Age: 21
End: 2021-07-23

## 2021-07-23 NOTE — TELEPHONE ENCOUNTER
Called and reached out to pt on several attempts and has not returned our phone calls  Stressed the importance of her calling us back so we can properly treat her  Will send the pt a certified letter

## 2021-07-23 NOTE — TELEPHONE ENCOUNTER
----- Message from Mahad Donovan sent at 7/22/2021 10:27 AM EDT -----  Regarding: FW: miscarriage  Please send patient a certified letter if she does not call today  Thanks  ----- Message -----  From: Andrei Martinez  Sent: 7/21/2021  11:47 AM EDT  To: [facility]  Subject: FW: miscarriage                                  Left another message for pt to call the office and stressed the importance of returning my call  Lawrence Lanier  ----- Message -----  From: Andrei Martinez  Sent: 7/20/2021   9:45 AM EDT  To: [facility]  Subject: FW: miscarriage                                  Left message for pt to please call the office to go over instructions     Lawrence Lanier  ----- Message -----  From: Liliane Dakin, CRNP  Sent: 7/20/2021   8:38 AM EDT  To: [facility]  Subject: FW: miscarriage                                  Rx for Cytotec sent to pharmacy  Pt to take Cytotec 800mg all at once  Call if no bleeding in 48 hours  Call or go to ER if  Soaking a pad an hour x 3 hours, lightheadedness, dizziness  Tylenol for pain, no Motrin or Alieve products  Nothing in the vagina, no tampons, intercourse, tub bath, or douche  Will do Hcg weekly until back to non-pregnant  Once non-pregnant recommend one regular menses then can try for pregnancy again if she desires  ----- Message -----  From: Andrei Martinez  Sent: 7/19/2021   3:34 PM EDT  To: Liliane Dakin, CRNP  Subject: FW: miscarriage                                  Called and spoke with pt and states she has not had any bleeding at all, denies any pain  Would like to try Cytotec vs D&E  She sounded like she was getting confused  Not sure if you wanted to discuss with her again      Lawrence Lanier  ----- Message -----  From: Andrei Martinez  Sent: 7/15/2021  10:33 AM EDT  To: [facility]  Subject: FW: miscarriage                                  Called pt and left message for her to call the office  ----- Message -----  From: MARTIN Jo  Sent: 7/8/2021   1:32 PM EDT  To: [facility]  Subject: miscarriage                                      Pt awaiting natural miscarriage to occur  Reviewed for her to call when bleeding starts  Can we call and check on her in one week if she does not call sooner       [place]

## 2021-07-27 NOTE — TELEPHONE ENCOUNTER
Called her emergency contact (mother) to have her please reach out to [facility] to have her call the office as soon as she can

## 2021-08-01 ENCOUNTER — APPOINTMENT (EMERGENCY)
Dept: RADIOLOGY | Facility: HOSPITAL | Age: 21
End: 2021-08-01
Payer: COMMERCIAL

## 2021-08-01 ENCOUNTER — HOSPITAL ENCOUNTER (EMERGENCY)
Facility: HOSPITAL | Age: 21
Discharge: HOME | End: 2021-08-02
Attending: EMERGENCY MEDICINE | Admitting: EMERGENCY MEDICINE
Payer: COMMERCIAL

## 2021-08-01 DIAGNOSIS — N93.9 VAGINAL BLEEDING: ICD-10-CM

## 2021-08-01 DIAGNOSIS — O03.9 MISCARRIAGE: Primary | ICD-10-CM

## 2021-08-01 LAB
ALBUMIN SERPL BCP-MCNC: 3.9 G/DL (ref 3.5–5)
ALP SERPL-CCNC: 57 U/L (ref 46–116)
ALT SERPL W P-5'-P-CCNC: 41 U/L (ref 12–78)
ANION GAP SERPL CALCULATED.3IONS-SCNC: 4 MMOL/L (ref 4–13)
AST SERPL W P-5'-P-CCNC: 21 U/L (ref 5–45)
B-HCG SERPL-ACNC: 623 MIU/ML
BASOPHILS # BLD AUTO: 0.04 THOUSANDS/ΜL (ref 0–0.1)
BASOPHILS NFR BLD AUTO: 1 % (ref 0–1)
BILIRUB SERPL-MCNC: 0.24 MG/DL (ref 0.2–1)
BUN SERPL-MCNC: 9 MG/DL (ref 5–25)
CALCIUM SERPL-MCNC: 9.4 MG/DL (ref 8.3–10.1)
CHLORIDE SERPL-SCNC: 104 MMOL/L (ref 100–108)
CO2 SERPL-SCNC: 27 MMOL/L (ref 21–32)
CREAT SERPL-MCNC: 0.84 MG/DL (ref 0.6–1.3)
EOSINOPHIL # BLD AUTO: 0.46 THOUSAND/ΜL (ref 0–0.61)
EOSINOPHIL NFR BLD AUTO: 7 % (ref 0–6)
ERYTHROCYTE [DISTWIDTH] IN BLOOD BY AUTOMATED COUNT: 21.3 % (ref 11.6–15.1)
GFR SERPL CREATININE-BSD FRML MDRD: 116 ML/MIN/1.73SQ M
GLUCOSE SERPL-MCNC: 94 MG/DL (ref 65–140)
HCT VFR BLD AUTO: 36.1 % (ref 34.8–46.1)
HGB BLD-MCNC: 10.7 G/DL (ref 11.5–15.4)
IMM GRANULOCYTES # BLD AUTO: 0.01 THOUSAND/UL (ref 0–0.2)
IMM GRANULOCYTES NFR BLD AUTO: 0 % (ref 0–2)
LYMPHOCYTES # BLD AUTO: 2.55 THOUSANDS/ΜL (ref 0.6–4.47)
LYMPHOCYTES NFR BLD AUTO: 39 % (ref 14–44)
MCH RBC QN AUTO: 21.7 PG (ref 26.8–34.3)
MCHC RBC AUTO-ENTMCNC: 29.6 G/DL (ref 31.4–37.4)
MCV RBC AUTO: 73 FL (ref 82–98)
MONOCYTES # BLD AUTO: 0.74 THOUSAND/ΜL (ref 0.17–1.22)
MONOCYTES NFR BLD AUTO: 11 % (ref 4–12)
NEUTROPHILS # BLD AUTO: 2.7 THOUSANDS/ΜL (ref 1.85–7.62)
NEUTS SEG NFR BLD AUTO: 42 % (ref 43–75)
NRBC BLD AUTO-RTO: 0 /100 WBCS
PLATELET # BLD AUTO: 408 THOUSANDS/UL (ref 149–390)
PMV BLD AUTO: 9.6 FL (ref 8.9–12.7)
POTASSIUM SERPL-SCNC: 4.1 MMOL/L (ref 3.5–5.3)
PROT SERPL-MCNC: 8.1 G/DL (ref 6.4–8.2)
RBC # BLD AUTO: 4.94 MILLION/UL (ref 3.81–5.12)
SODIUM SERPL-SCNC: 135 MMOL/L (ref 136–145)
WBC # BLD AUTO: 6.5 THOUSAND/UL (ref 4.31–10.16)

## 2021-08-01 PROCEDURE — 99284 EMERGENCY DEPT VISIT MOD MDM: CPT

## 2021-08-01 PROCEDURE — 76830 TRANSVAGINAL US NON-OB: CPT

## 2021-08-01 PROCEDURE — 80053 COMPREHEN METABOLIC PANEL: CPT | Performed by: EMERGENCY MEDICINE

## 2021-08-01 PROCEDURE — 96374 THER/PROPH/DIAG INJ IV PUSH: CPT

## 2021-08-01 PROCEDURE — 84702 CHORIONIC GONADOTROPIN TEST: CPT | Performed by: EMERGENCY MEDICINE

## 2021-08-01 PROCEDURE — 99285 EMERGENCY DEPT VISIT HI MDM: CPT | Performed by: EMERGENCY MEDICINE

## 2021-08-01 PROCEDURE — 85025 COMPLETE CBC W/AUTO DIFF WBC: CPT | Performed by: EMERGENCY MEDICINE

## 2021-08-01 PROCEDURE — 36415 COLL VENOUS BLD VENIPUNCTURE: CPT | Performed by: EMERGENCY MEDICINE

## 2021-08-01 PROCEDURE — 76856 US EXAM PELVIC COMPLETE: CPT

## 2021-08-01 RX ORDER — KETOROLAC TROMETHAMINE 30 MG/ML
15 INJECTION, SOLUTION INTRAMUSCULAR; INTRAVENOUS ONCE
Status: COMPLETED | OUTPATIENT
Start: 2021-08-01 | End: 2021-08-01

## 2021-08-01 RX ADMIN — KETOROLAC TROMETHAMINE 15 MG: 30 INJECTION, SOLUTION INTRAMUSCULAR at 21:38

## 2021-08-02 VITALS
BODY MASS INDEX: 18.95 KG/M2 | SYSTOLIC BLOOD PRESSURE: 113 MMHG | RESPIRATION RATE: 18 BRPM | OXYGEN SATURATION: 99 % | HEART RATE: 75 BPM | WEIGHT: 110.4 LBS | DIASTOLIC BLOOD PRESSURE: 64 MMHG | TEMPERATURE: 98.8 F

## 2021-08-02 RX ORDER — NAPROXEN 500 MG/1
500 TABLET ORAL 2 TIMES DAILY WITH MEALS
Qty: 30 TABLET | Refills: 0 | Status: SHIPPED | OUTPATIENT
Start: 2021-08-02 | End: 2022-06-03 | Stop reason: ALTCHOICE

## 2021-08-02 NOTE — ED NOTES
Pt reports thinking that taking medication prescribed by the dr would make her complicit in spontaneous  "like it would be my fault"  Pt also states she is scared that there is "something wrong with her" and that she wont be able to have children in the future  Pt is tearful at approach        Wil Patel RN  [phone]

## 2021-08-02 NOTE — ED NOTES
Pt ambulating with steady gait to bathroom to provide urine sample        Glendy CANO RN  08/01/21 [id number]

## 2021-08-02 NOTE — DISCHARGE INSTRUCTIONS
Follow-up with Dr Blanca Schultz in the next 1-2 weeks for further evaluation  Take the prescribed Naprosyn for cramping, pain, and bleeding  If you have any thick foul-smelling discharge, fevers, chills, severe pain, or persistent heavy bleeding, return to the emergency department for further evaluation

## 2021-08-02 NOTE — ED PROVIDER NOTES
History  Chief Complaint   Patient presents with    Threatened Miscarriage     See YG   States she is having a miscarriage since , states she did not call OB  States today pain and bleeding became worse  On review of med list, patient was prescribed Cytotec tablets and states she did not      HPI  Patient is a 80-year-old  LMP on , previously diagnosed with anovulatory pregnancy on ultrasound on , prescribed Cytotec previously by OBGYN however never took this or followed up additionally with OBGYN following initial evaluation, presenting now for lower abdominal pain, cramping, vaginal bleeding  Patient states that she 1st started to have symptoms on , see today been progressive since that time, states that yesterday she had passage of more clots and which she believed to be fetal tissue  Patient states some continued bleeding today, as well as continued pelvic cramping  Patient denies nausea, vomiting, fevers, chills, chest pain, shortness of breath, fatigue, lightheadedness  Prior to Admission Medications   Prescriptions Last Dose Informant Patient Reported? Taking? albuterol (PROVENTIL HFA,VENTOLIN HFA) 90 mcg/act inhaler   No No   Sig: Inhale 2 puffs every 6 (six) hours as needed for wheezing or shortness of breath  ferrous sulfate 325 (65 Fe) mg tablet   No No   Sig: Take 1 tablet (325 mg total) by mouth daily   misoprostol (CYTOTEC) 200 mcg tablet  Self No No   Sig: Take 4 tablets (800 mcg total) by mouth once for 1 dose   Patient taking differently: Take 800 mcg by mouth once Patient states did not    naproxen (NAPROSYN) 500 mg tablet   No No   Sig: Take 1 tablet (500 mg total) by mouth 2 (two) times a day with meals   Patient not taking: Reported on 2021      Facility-Administered Medications: None       Past Medical History:   Diagnosis Date    Anemia     Asthma     Seasonal allergies        History reviewed   No pertinent surgical history  History reviewed  No pertinent family history  I have reviewed and agree with the history as documented  E-Cigarette/Vaping    E-Cigarette Use Never User      E-Cigarette/Vaping Substances    Nicotine No     THC No     CBD No     Flavoring No     Other No     Unknown No      Social History     Tobacco Use    Smoking status: Never Smoker    Smokeless tobacco: Never Used   Vaping Use    Vaping Use: Never used   Substance Use Topics    Alcohol use: No    Drug use: No       Review of Systems   Constitutional: Negative for chills, fatigue and fever  HENT: Negative for sore throat  Respiratory: Negative for cough, chest tightness and shortness of breath  Cardiovascular: Negative for chest pain  Gastrointestinal: Negative for abdominal distention, abdominal pain, constipation, diarrhea, nausea and vomiting  Endocrine: Negative for polydipsia and polyuria  Genitourinary: Positive for vaginal bleeding  Negative for dysuria, hematuria, menstrual problem, vaginal discharge and vaginal pain  Musculoskeletal: Negative for arthralgias and myalgias  Skin: Negative for color change and rash  Neurological: Negative for dizziness and headaches  Psychiatric/Behavioral: Negative for confusion  All other systems reviewed and are negative  Physical Exam  Physical Exam  Vitals reviewed  Constitutional:       General: She is not in acute distress  Appearance: She is well-developed  She is not diaphoretic  Comments: Mildly uncomfortable appearing but nondistressed, no pallor   HENT:      Head: Normocephalic and atraumatic  Right Ear: External ear normal       Left Ear: External ear normal       Nose: Nose normal       Mouth/Throat:      Pharynx: No oropharyngeal exudate  Eyes:      Pupils: Pupils are equal, round, and reactive to light  Cardiovascular:      Rate and Rhythm: Normal rate and regular rhythm  Heart sounds: Normal heart sounds  No murmur heard  No friction rub  No gallop  Pulmonary:      Effort: Pulmonary effort is normal  No respiratory distress  Breath sounds: Normal breath sounds  No wheezing  Chest:      Chest wall: No tenderness  Abdominal:      General: Bowel sounds are normal  There is no distension  Palpations: Abdomen is soft  There is no mass  Tenderness: There is abdominal tenderness  There is no guarding  Comments: Moderate suprapubic tenderness without rigidity, rebound, guarding  Genitourinary:     Comments: Small amount of blood in vaginal vault without any visible brisk bleeding, no active bleeding from cervix  Musculoskeletal:         General: No deformity  Normal range of motion  Cervical back: Normal range of motion  Lymphadenopathy:      Cervical: No cervical adenopathy  Skin:     General: Skin is warm and dry  Capillary Refill: Capillary refill takes less than 2 seconds  Neurological:      Mental Status: She is alert and oriented to person, place, and time           Vital Signs  ED Triage Vitals [08/2000]   Temperature Pulse Respirations Blood Pressure SpO2   99 1 °F (37 3 °C) 80 18 121/65 100 %      Temp Source Heart Rate Source Patient Position - Orthostatic VS BP Location FiO2 (%)   Tympanic Monitor Sitting Left arm --      Pain Score       Worst Possible Pain           Vitals:    08/2000 08/01/21 2226 08/02/21 0030   BP: 121/65 103/59 113/64   Pulse: 80 74 75   Patient Position - Orthostatic VS: Sitting Sitting Sitting         Visual Acuity      ED Medications  Medications   ketorolac (TORADOL) injection 15 mg (15 mg Intravenous Given 8/1/21 2138)       Diagnostic Studies  Results Reviewed     Procedure Component Value Units Date/Time    hCG, quantitative [[id number]]  (Abnormal) Collected: 08/01/21 2052    Lab Status: Final result Specimen: Blood from Arm, Right Updated: 08/01/21 2132     HCG, Quant 623 mIU/mL     Narrative:       Expected Ranges:     Approximate Approximate HCG  Gestation age          Concentration ( mIU/mL)  _____________          ______________________   Babs Wilkes                      HCG values  0 2-1                       5-50  1-2                           2-3                         100-5000  3-4                         500-47708  4-5                         1000-61311  5-6                         [id number]  6-8                         86366-177188  8-12                        [id number]      Comprehensive metabolic panel [[id number]]  (Abnormal) Collected: 08/01/21 2052    Lab Status: Final result Specimen: Blood from Arm, Right Updated: 08/01/21 2130     Sodium 135 mmol/L      Potassium 4 1 mmol/L      Chloride 104 mmol/L      CO2 27 mmol/L      ANION GAP 4 mmol/L      BUN 9 mg/dL      Creatinine 0 84 mg/dL      Glucose 94 mg/dL      Calcium 9 4 mg/dL      AST 21 U/L      ALT 41 U/L      Alkaline Phosphatase 57 U/L      Total Protein 8 1 g/dL      Albumin 3 9 g/dL      Total Bilirubin 0 24 mg/dL      eGFR 116 ml/min/1 73sq m     Narrative:      [facility] guidelines for Chronic Kidney Disease (CKD):     Stage 1 with normal or high GFR (GFR > 90 mL/min/1 73 square meters)    Stage 2 Mild CKD (GFR = 60-89 mL/min/1 73 square meters)    Stage 3A Moderate CKD (GFR = 45-59 mL/min/1 73 square meters)    Stage 3B Moderate CKD (GFR = 30-44 mL/min/1 73 square meters)    Stage 4 Severe CKD (GFR = 15-29 mL/min/1 73 square meters)    Stage 5 End Stage CKD (GFR <15 mL/min/1 73 square meters)  Note: GFR calculation is accurate only with a steady state creatinine    CBC and differential [[id number]]  (Abnormal) Collected: 08/01/21 2052    Lab Status: Final result Specimen: Blood from Arm, Right Updated: 08/01/21 2058     WBC 6 50 Thousand/uL      RBC 4 94 Million/uL      Hemoglobin 10 7 g/dL      Hematocrit 36 1 %      MCV 73 fL      MCH 21 7 pg      MCHC 29 6 g/dL      RDW 21 3 %      MPV 9 6 fL      Platelets 491 Thousands/uL      nRBC 0 /100 WBCs      Neutrophils Relative 42 %      Immat GRANS % 0 %      Lymphocytes Relative 39 %      Monocytes Relative 11 %      Eosinophils Relative 7 %      Basophils Relative 1 %      Neutrophils Absolute 2 70 Thousands/µL      Immature Grans Absolute 0 01 Thousand/uL      Lymphocytes Absolute 2 55 Thousands/µL      Monocytes Absolute 0 74 Thousand/µL      Eosinophils Absolute 0 46 Thousand/µL      Basophils Absolute 0 04 Thousands/µL                  US pelvis complete w transvaginal   Final Result by Marjorie Henry MD (20)       1  Mildly heterogeneous endometrium measuring 7 mm in maximal thickness  No focal mass or abnormal vascularity to suggest retained products of conception  2   Probable small resolving left hemorrhagic cyst       3   Small amount of pelvic free fluid  Workstation performed: [id number]                    Procedures  Procedures         ED Course                                           MDM  Number of Diagnoses or Management Options  Miscarriage  Vaginal bleeding  Diagnosis management comments: 27-year-old female with vaginal bleeding in setting ambulatory pregnancy, vaginal bleeding, abdominal tenderness, but nontoxic without symptoms of septic  or symptomatic anemia  Discussed patient with OBGYN who recommended pelvic ultrasound to evaluate for retained products of conception which were not seen on ultrasound  Patient was significant symptomatic improvement following Toradol, discharged with OBGYN follow-up, verbal and written return precautions        Disposition  Final diagnoses:   Miscarriage   Vaginal bleeding     Time reflects when diagnosis was documented in both MDM as applicable and the Disposition within this note     Time User Action Codes Description Comment    2021 12:29 AM Reji Bazzi Add [O03 9] Miscarriage     2021 12:29 AM Nada Sergiog Add [N93 9] Vaginal bleeding       ED Disposition     ED Disposition Condition Date/Time Comment    Discharge Stable Mon Aug 2, 2021 12:28 AM Joel Ventura discharge to home/self care  Follow-up Information     Follow up With Specialties Details Why Contact Info    Lyndon Tucker MD Obstetrics and Gynecology, Obstetrics, Gynecology   [address] [place] [address]  [phone]            Discharge Medication List as of 8/2/2021 12:41 AM      START taking these medications    Details   !! naproxen (NAPROSYN) 500 mg tablet Take 1 tablet (500 mg total) by mouth 2 (two) times a day with meals, Starting Mon 8/2/2021, Normal       !! - Potential duplicate medications found  Please discuss with provider  CONTINUE these medications which have NOT CHANGED    Details   albuterol (PROVENTIL HFA,VENTOLIN HFA) 90 mcg/act inhaler Inhale 2 puffs every 6 (six) hours as needed for wheezing or shortness of breath , Starting 9/22/2016, Until Discontinued, Print      ferrous sulfate 325 (65 Fe) mg tablet Take 1 tablet (325 mg total) by mouth daily, Starting Fri 5/7/2021, Normal      misoprostol (CYTOTEC) 200 mcg tablet Take 4 tablets (800 mcg total) by mouth once for 1 dose, Starting Tue 7/20/2021, Normal      !! naproxen (NAPROSYN) 500 mg tablet Take 1 tablet (500 mg total) by mouth 2 (two) times a day with meals, Starting Fri 5/7/2021, Normal       !! - Potential duplicate medications found  Please discuss with provider  No discharge procedures on file      PDMP Review     None          ED Provider  Electronically Signed by           Ignacio Savage MD  08/03/21 0499

## 2021-08-06 ENCOUNTER — TELEPHONE (OUTPATIENT)
Dept: OBGYN CLINIC | Facility: CLINIC | Age: 21
End: 2021-08-06

## 2021-08-06 DIAGNOSIS — O03.9 SAB (SPONTANEOUS ABORTION): ICD-10-CM

## 2021-08-06 DIAGNOSIS — A59.01 TRICHOMONAS VAGINITIS: ICD-10-CM

## 2021-08-06 DIAGNOSIS — O02.0 BLIGHTED OVUM: Primary | ICD-10-CM

## 2021-08-06 RX ORDER — METRONIDAZOLE 500 MG/1
2000 TABLET ORAL ONCE
Qty: 4 TABLET | Refills: 0 | Status: SHIPPED | OUTPATIENT
Start: 2021-08-06 | End: 2021-08-06

## 2021-08-06 NOTE — TELEPHONE ENCOUNTER
Patient came into the office for follow up of her recent blighted ovum, patient seen in the ED for eval of bleeding and pelvic pain , evaluated and treated US showed normal pelvic US and no retained fetal products, advised to follow up   Samira Bourgeois to order HCG quants to repeat and follow down to zero , also patient informed she doid not  treatment for most recent infection of Trich , will send new script to the pharmacy and advised to complete course of antibiotic and have partner treated before she resume sexual activity    PRABHAKAR WALL

## 2021-09-13 ENCOUNTER — HOSPITAL ENCOUNTER (EMERGENCY)
Facility: HOSPITAL | Age: 21
Discharge: HOME | End: 2021-09-14
Attending: EMERGENCY MEDICINE
Payer: COMMERCIAL

## 2021-09-13 VITALS
RESPIRATION RATE: 16 BRPM | HEART RATE: 88 BPM | OXYGEN SATURATION: 97 % | TEMPERATURE: 98 F | DIASTOLIC BLOOD PRESSURE: 64 MMHG | SYSTOLIC BLOOD PRESSURE: 121 MMHG

## 2021-09-13 DIAGNOSIS — N89.8 VAGINAL DISCHARGE: ICD-10-CM

## 2021-09-13 DIAGNOSIS — R10.9 ABDOMINAL PAIN: Primary | ICD-10-CM

## 2021-09-13 LAB
ALBUMIN SERPL BCP-MCNC: 4.2 G/DL (ref 3.5–5)
ALP SERPL-CCNC: 56 U/L (ref 46–116)
ALT SERPL W P-5'-P-CCNC: 52 U/L (ref 12–78)
ANION GAP SERPL CALCULATED.3IONS-SCNC: 11 MMOL/L (ref 4–13)
AST SERPL W P-5'-P-CCNC: 31 U/L (ref 5–45)
BACTERIA UR QL AUTO: ABNORMAL /HPF
BASOPHILS # BLD AUTO: 0.04 THOUSANDS/ΜL (ref 0–0.1)
BASOPHILS NFR BLD AUTO: 1 % (ref 0–1)
BILIRUB SERPL-MCNC: 0.51 MG/DL (ref 0.2–1)
BILIRUB UR QL STRIP: NEGATIVE
BUN SERPL-MCNC: 10 MG/DL (ref 5–25)
CALCIUM SERPL-MCNC: 9 MG/DL (ref 8.3–10.1)
CHLORIDE SERPL-SCNC: 105 MMOL/L (ref 100–108)
CLARITY UR: CLEAR
CO2 SERPL-SCNC: 26 MMOL/L (ref 21–32)
COLOR UR: YELLOW
CREAT SERPL-MCNC: 0.92 MG/DL (ref 0.6–1.3)
EOSINOPHIL # BLD AUTO: 0.66 THOUSAND/ΜL (ref 0–0.61)
EOSINOPHIL NFR BLD AUTO: 10 % (ref 0–6)
ERYTHROCYTE [DISTWIDTH] IN BLOOD BY AUTOMATED COUNT: 17.7 % (ref 11.6–15.1)
EXT PREG TEST URINE: NEGATIVE
EXT. CONTROL ED NAV: NORMAL
GFR SERPL CREATININE-BSD FRML MDRD: 104 ML/MIN/1.73SQ M
GLUCOSE SERPL-MCNC: 73 MG/DL (ref 65–140)
GLUCOSE UR STRIP-MCNC: NEGATIVE MG/DL
HCT VFR BLD AUTO: 32 % (ref 34.8–46.1)
HGB BLD-MCNC: 10.2 G/DL (ref 11.5–15.4)
HGB UR QL STRIP.AUTO: NEGATIVE
KETONES UR STRIP-MCNC: ABNORMAL MG/DL
LEUKOCYTE ESTERASE UR QL STRIP: ABNORMAL
LIPASE SERPL-CCNC: 44 U/L (ref 73–393)
LYMPHOCYTES # BLD AUTO: 2.86 THOUSANDS/ΜL (ref 0.6–4.47)
LYMPHOCYTES NFR BLD AUTO: 45 % (ref 14–44)
MCH RBC QN AUTO: 22.1 PG (ref 26.8–34.3)
MCHC RBC AUTO-ENTMCNC: 31.9 G/DL (ref 31.4–37.4)
MCV RBC AUTO: 69 FL (ref 82–98)
MONOCYTES # BLD AUTO: 0.57 THOUSAND/ΜL (ref 0.17–1.22)
MONOCYTES NFR BLD AUTO: 9 % (ref 4–12)
MUCOUS THREADS UR QL AUTO: ABNORMAL
NEUTROPHILS # BLD AUTO: 2.21 THOUSANDS/ΜL (ref 1.85–7.62)
NEUTS SEG NFR BLD AUTO: 35 % (ref 43–75)
NITRITE UR QL STRIP: NEGATIVE
NON-SQ EPI CELLS URNS QL MICRO: ABNORMAL /HPF
PH UR STRIP.AUTO: 6.5 [PH]
PLATELET # BLD AUTO: 466 THOUSANDS/UL (ref 149–390)
PMV BLD AUTO: 9.8 FL (ref 8.9–12.7)
POTASSIUM SERPL-SCNC: 3.5 MMOL/L (ref 3.5–5.3)
PROT SERPL-MCNC: 7.8 G/DL (ref 6.4–8.2)
PROT UR STRIP-MCNC: NEGATIVE MG/DL
RBC # BLD AUTO: 4.62 MILLION/UL (ref 3.81–5.12)
RBC #/AREA URNS AUTO: ABNORMAL /HPF
SODIUM SERPL-SCNC: 142 MMOL/L (ref 136–145)
SP GR UR STRIP.AUTO: >=1.03 (ref 1–1.03)
UROBILINOGEN UR QL STRIP.AUTO: 4 E.U./DL
WBC # BLD AUTO: 6.34 THOUSAND/UL (ref 4.31–10.16)
WBC #/AREA URNS AUTO: ABNORMAL /HPF

## 2021-09-13 PROCEDURE — 87591 N.GONORRHOEAE DNA AMP PROB: CPT | Performed by: EMERGENCY MEDICINE

## 2021-09-13 PROCEDURE — 87070 CULTURE OTHR SPECIMN AEROBIC: CPT | Performed by: EMERGENCY MEDICINE

## 2021-09-13 PROCEDURE — 96375 TX/PRO/DX INJ NEW DRUG ADDON: CPT

## 2021-09-13 PROCEDURE — 87491 CHLMYD TRACH DNA AMP PROBE: CPT | Performed by: EMERGENCY MEDICINE

## 2021-09-13 PROCEDURE — 81001 URINALYSIS AUTO W/SCOPE: CPT | Performed by: EMERGENCY MEDICINE

## 2021-09-13 PROCEDURE — 85025 COMPLETE CBC W/AUTO DIFF WBC: CPT | Performed by: EMERGENCY MEDICINE

## 2021-09-13 PROCEDURE — 99284 EMERGENCY DEPT VISIT MOD MDM: CPT

## 2021-09-13 PROCEDURE — 87077 CULTURE AEROBIC IDENTIFY: CPT | Performed by: EMERGENCY MEDICINE

## 2021-09-13 PROCEDURE — 81025 URINE PREGNANCY TEST: CPT | Performed by: EMERGENCY MEDICINE

## 2021-09-13 PROCEDURE — 99284 EMERGENCY DEPT VISIT MOD MDM: CPT | Performed by: EMERGENCY MEDICINE

## 2021-09-13 PROCEDURE — 83690 ASSAY OF LIPASE: CPT | Performed by: EMERGENCY MEDICINE

## 2021-09-13 PROCEDURE — 96372 THER/PROPH/DIAG INJ SC/IM: CPT

## 2021-09-13 PROCEDURE — 36415 COLL VENOUS BLD VENIPUNCTURE: CPT | Performed by: EMERGENCY MEDICINE

## 2021-09-13 PROCEDURE — 80053 COMPREHEN METABOLIC PANEL: CPT | Performed by: EMERGENCY MEDICINE

## 2021-09-13 PROCEDURE — 96374 THER/PROPH/DIAG INJ IV PUSH: CPT

## 2021-09-13 PROCEDURE — 96361 HYDRATE IV INFUSION ADD-ON: CPT

## 2021-09-13 RX ORDER — METRONIDAZOLE 500 MG/1
500 TABLET ORAL ONCE
Status: COMPLETED | OUTPATIENT
Start: 2021-09-13 | End: 2021-09-13

## 2021-09-13 RX ORDER — ONDANSETRON 2 MG/ML
4 INJECTION INTRAMUSCULAR; INTRAVENOUS ONCE
Status: COMPLETED | OUTPATIENT
Start: 2021-09-13 | End: 2021-09-13

## 2021-09-13 RX ORDER — AZITHROMYCIN 250 MG/1
1000 TABLET, FILM COATED ORAL ONCE
Status: COMPLETED | OUTPATIENT
Start: 2021-09-13 | End: 2021-09-13

## 2021-09-13 RX ORDER — KETOROLAC TROMETHAMINE 30 MG/ML
15 INJECTION, SOLUTION INTRAMUSCULAR; INTRAVENOUS ONCE
Status: COMPLETED | OUTPATIENT
Start: 2021-09-13 | End: 2021-09-13

## 2021-09-13 RX ORDER — METRONIDAZOLE 500 MG/1
500 TABLET ORAL EVERY 12 HOURS SCHEDULED
Qty: 14 TABLET | Refills: 0 | Status: SHIPPED | OUTPATIENT
Start: 2021-09-13 | End: 2021-09-20

## 2021-09-13 RX ADMIN — AZITHROMYCIN MONOHYDRATE 1000 MG: 250 TABLET ORAL at 23:18

## 2021-09-13 RX ADMIN — METRONIDAZOLE 500 MG: 500 TABLET ORAL at 23:18

## 2021-09-13 RX ADMIN — ONDANSETRON 4 MG: 2 INJECTION INTRAMUSCULAR; INTRAVENOUS at 22:08

## 2021-09-13 RX ADMIN — KETOROLAC TROMETHAMINE 15 MG: 30 INJECTION, SOLUTION INTRAMUSCULAR at 22:20

## 2021-09-13 RX ADMIN — SODIUM CHLORIDE 1000 ML: 0.9 INJECTION, SOLUTION INTRAVENOUS at 22:08

## 2021-09-13 RX ADMIN — LIDOCAINE HYDROCHLORIDE 500 MG: 10 INJECTION, SOLUTION EPIDURAL; INFILTRATION; INTRACAUDAL; PERINEURAL at 23:29

## 2021-09-14 LAB
C TRACH DNA SPEC QL NAA+PROBE: NEGATIVE
N GONORRHOEA DNA SPEC QL NAA+PROBE: NEGATIVE

## 2021-09-14 NOTE — ED PROVIDER NOTES
History  Chief Complaint   Patient presents with    Abdominal Pain     low pelvic pain since yesterday  denies any urinary issue, no constipation or vaginal discharge     20 yo female c/o pain across lower abdomen x 2 days  + associated nausea  No vomiting or diarrhea  No dysuria  No vaginal bleeding or discharge  LMP 9/1 (2 weeks ago)  Pt  Says she did take the medicine for trichomonas as prescribed in August   Pt  Is sexually active  History provided by:  Patient   used: No    Abdominal Pain  Associated symptoms: nausea    Associated symptoms: no chest pain, no cough, no diarrhea, no dysuria, no fever, no shortness of breath and no vomiting        Prior to Admission Medications   Prescriptions Last Dose Informant Patient Reported? Taking? albuterol (PROVENTIL HFA,VENTOLIN HFA) 90 mcg/act inhaler   No No   Sig: Inhale 2 puffs every 6 (six) hours as needed for wheezing or shortness of breath  ferrous sulfate 325 (65 Fe) mg tablet Not Taking at Unknown time  No No   Sig: Take 1 tablet (325 mg total) by mouth daily   Patient not taking: Reported on 9/13/2021   misoprostol (CYTOTEC) 200 mcg tablet  Self No No   Sig: Take 4 tablets (800 mcg total) by mouth once for 1 dose   Patient taking differently: Take 800 mcg by mouth once Patient states did not    naproxen (NAPROSYN) 500 mg tablet   No No   Sig: Take 1 tablet (500 mg total) by mouth 2 (two) times a day with meals   Patient not taking: Reported on 6/4/2021   naproxen (NAPROSYN) 500 mg tablet Not Taking at Unknown time  No No   Sig: Take 1 tablet (500 mg total) by mouth 2 (two) times a day with meals   Patient not taking: Reported on 9/13/2021      Facility-Administered Medications: None       Past Medical History:   Diagnosis Date    Anemia     Asthma     Seasonal allergies        History reviewed  No pertinent surgical history  History reviewed  No pertinent family history    I have reviewed and agree with the history as documented  E-Cigarette/Vaping    E-Cigarette Use Never User      E-Cigarette/Vaping Substances    Nicotine No     THC No     CBD No     Flavoring No     Other No     Unknown No      Social History     Tobacco Use    Smoking status: Never Smoker    Smokeless tobacco: Never Used   Vaping Use    Vaping Use: Never used   Substance Use Topics    Alcohol use: No    Drug use: No       Review of Systems   Constitutional: Negative  Negative for fever  HENT: Negative  Eyes: Negative  Respiratory: Negative  Negative for cough and shortness of breath  Cardiovascular: Negative  Negative for chest pain  Gastrointestinal: Positive for abdominal pain and nausea  Negative for diarrhea and vomiting  Genitourinary: Negative  Negative for dysuria and flank pain  Musculoskeletal: Negative  Negative for back pain and myalgias  Skin: Negative  Negative for rash  Neurological: Negative  Negative for dizziness and headaches  Hematological: Does not bruise/bleed easily  Psychiatric/Behavioral: Negative  All other systems reviewed and are negative  Physical Exam  Physical Exam  Vitals and nursing note reviewed  Constitutional:       General: She is not in acute distress  Appearance: She is well-developed  She is not ill-appearing, toxic-appearing or diaphoretic  HENT:      Head: Normocephalic and atraumatic  Right Ear: External ear normal       Left Ear: External ear normal    Eyes:      General: No scleral icterus  Conjunctiva/sclera: Conjunctivae normal    Cardiovascular:      Rate and Rhythm: Normal rate and regular rhythm  Heart sounds: Normal heart sounds  No murmur heard  Pulmonary:      Effort: Pulmonary effort is normal  No respiratory distress  Breath sounds: Normal breath sounds  Abdominal:      General: Bowel sounds are normal  There is no distension  Palpations: Abdomen is soft  Tenderness:  There is generalized abdominal tenderness  There is no guarding  Comments: Pt  C/o pain to palpation all over abdomen   Genitourinary:     Vagina: Vaginal discharge present  Cervix: No cervical motion tenderness  Comments: + moderate amount yellowish discharge  Musculoskeletal:         General: No deformity  Normal range of motion  Cervical back: Normal range of motion and neck supple  Right lower leg: No edema  Left lower leg: No edema  Skin:     General: Skin is warm and dry  Coloration: Skin is not pale  Findings: No rash  Neurological:      General: No focal deficit present  Mental Status: She is alert and oriented to person, place, and time  Cranial Nerves: No cranial nerve deficit     Psychiatric:         Mood and Affect: Mood normal          Behavior: Behavior normal          Vital Signs  ED Triage Vitals [09/13/21 1936]   Temperature Pulse Respirations Blood Pressure SpO2   98 °F (36 7 °C) 82 16 117/61 100 %      Temp Source Heart Rate Source Patient Position - Orthostatic VS BP Location FiO2 (%)   Tympanic Monitor Sitting Right arm --      Pain Score       9           Vitals:    09/13/21 1936 09/13/21 2221   BP: 117/61 121/64   Pulse: 82 88   Patient Position - Orthostatic VS: Sitting Sitting         Visual Acuity      ED Medications  Medications   sodium chloride 0 9 % bolus 1,000 mL (1,000 mL Intravenous New Bag 9/13/21 2208)   cefTRIAXone (ROCEPHIN) 500 mg in lidocaine (PF) (XYLOCAINE-MPF) 1 % IM only syringe (has no administration in time range)   azithromycin (ZITHROMAX) tablet 1,000 mg (has no administration in time range)   metroNIDAZOLE (FLAGYL) tablet 500 mg (has no administration in time range)   ketorolac (TORADOL) injection 15 mg (15 mg Intravenous Given 9/13/21 2220)   ondansetron (ZOFRAN) injection 4 mg (4 mg Intravenous Given 9/13/21 2208)       Diagnostic Studies  Results Reviewed     Procedure Component Value Units Date/Time    Genital Comprehensive Culture [[id number]] Lab Status: No result Specimen: Genital     Urine Microscopic [[id number]]  (Abnormal) Collected: 09/13/21 2203    Lab Status: Final result Specimen: Urine, Clean Catch Updated: 09/13/21 2241     RBC, UA 0-1 /hpf      WBC, UA 10-20 /hpf      Epithelial Cells Moderate /hpf      Bacteria, UA Occasional /hpf      MUCUS THREADS Innumerable    Comprehensive metabolic panel [[id number]] Collected: 09/13/21 2207    Lab Status: Final result Specimen: Blood from Arm, Right Updated: 09/13/21 2230     Sodium 142 mmol/L      Potassium 3 5 mmol/L      Chloride 105 mmol/L      CO2 26 mmol/L      ANION GAP 11 mmol/L      BUN 10 mg/dL      Creatinine 0 92 mg/dL      Glucose 73 mg/dL      Calcium 9 0 mg/dL      AST 31 U/L      ALT 52 U/L      Alkaline Phosphatase 56 U/L      Total Protein 7 8 g/dL      Albumin 4 2 g/dL      Total Bilirubin 0 51 mg/dL      eGFR 104 ml/min/1 73sq m     Narrative:      [facility] guidelines for Chronic Kidney Disease (CKD):     Stage 1 with normal or high GFR (GFR > 90 mL/min/1 73 square meters)    Stage 2 Mild CKD (GFR = 60-89 mL/min/1 73 square meters)    Stage 3A Moderate CKD (GFR = 45-59 mL/min/1 73 square meters)    Stage 3B Moderate CKD (GFR = 30-44 mL/min/1 73 square meters)    Stage 4 Severe CKD (GFR = 15-29 mL/min/1 73 square meters)    Stage 5 End Stage CKD (GFR <15 mL/min/1 73 square meters)  Note: GFR calculation is accurate only with a steady state creatinine    Lipase [[id number]]  (Abnormal) Collected: 09/13/21 2207    Lab Status: Final result Specimen: Blood from Arm, Right Updated: 09/13/21 2230     Lipase 44 u/L     UA (URINE) with reflex to Scope [[id number]]  (Abnormal) Collected: 09/13/21 2203    Lab Status: Final result Specimen: Urine, Clean Catch Updated: 09/13/21 2218     Color, UA Yellow     Clarity, UA Clear     Specific Gravity, UA >=1 030     pH, UA 6 5     Leukocytes, UA Trace     Nitrite, UA Negative     Protein, UA Negative mg/dl Glucose, UA Negative mg/dl      Ketones, UA 15 (1+) mg/dl      Urobilinogen, UA 4 0 E U /dl      Bilirubin, UA Negative     Blood, UA Negative    POCT pregnancy, urine [[id number]]  (Normal) Resulted: 09/13/21 2217    Lab Status: Final result Updated: 09/13/21 2217     EXT PREG TEST UR (Ref: Negative) Negative     Control Valid    Chlamydia/GC amplified DNA by PCR [[id number]] Collected: 09/13/21 2203    Lab Status: In process Specimen: Urine, Other Updated: 09/13/21 2215    CBC and differential [[id number]]  (Abnormal) Collected: 09/13/21 2207    Lab Status: Final result Specimen: Blood from Arm, Right Updated: 09/13/21 2215     WBC 6 34 Thousand/uL      RBC 4 62 Million/uL      Hemoglobin 10 2 g/dL      Hematocrit 32 0 %      MCV 69 fL      MCH 22 1 pg      MCHC 31 9 g/dL      RDW 17 7 %      MPV 9 8 fL      Platelets 979 Thousands/uL      Neutrophils Relative 35 %      Lymphocytes Relative 45 %      Monocytes Relative 9 %      Eosinophils Relative 10 %      Basophils Relative 1 %      Neutrophils Absolute 2 21 Thousands/µL      Lymphocytes Absolute 2 86 Thousands/µL      Monocytes Absolute 0 57 Thousand/µL      Eosinophils Absolute 0 66 Thousand/µL      Basophils Absolute 0 04 Thousands/µL                  No orders to display              Procedures  Procedures         ED Course                                           MDM  Number of Diagnoses or Management Options  Abdominal pain  Vaginal discharge  Diagnosis management comments: Based on vaginal discharge and history and no urinary complaints, will pan treat for suspected STD with abx  Pt  Advised rest, fluids, tylenol/advil/heating pad prn  Follow up if any worsening        Disposition  Final diagnoses:   Abdominal pain   Vaginal discharge     Time reflects when diagnosis was documented in both MDM as applicable and the Disposition within this note     Time User Action Codes Description Comment    6/74/3941 30:40 PM Hannah PATHAK Add [T81 4] Abdominal pain 0/32/7180 37:11 PM Ericka Olsen Add [L21 4] UTI (urinary tract infection)     2/16/3591 43:54 PM Lianna Hong PATHAK Remove [N39 0] UTI (urinary tract infection)     0/50/5723 60:57 PM Wen Bernalvine Add [H08 6] Vaginal discharge       ED Disposition     ED Disposition Condition Date/Time Comment    Discharge Stable Mon Sep 13, 2021 10:59 PM Lucina Landau discharge to home/self care  Follow-up Information     Follow up With Specialties Details Why Contact Info    Ronald Tipton MD Family Medicine  As needed [address] [place] [address] [id number]            Patient's Medications   Discharge Prescriptions    METRONIDAZOLE (FLAGYL) 500 MG TABLET    Take 1 tablet (500 mg total) by mouth every 12 (twelve) hours for 7 days       Start Date: 9/13/2021 End Date: 9/20/2021       Order Dose: 500 mg       Quantity: 14 tablet    Refills: 0     No discharge procedures on file      PDMP Review     None          ED Provider  Electronically Signed by           Rhoda Morales MD  24/89/78 7767

## 2021-09-16 LAB
BACTERIA GENITAL AEROBE CULT: ABNORMAL
BACTERIA GENITAL AEROBE CULT: ABNORMAL

## 2021-11-07 ENCOUNTER — HOSPITAL ENCOUNTER (EMERGENCY)
Facility: HOSPITAL | Age: 21
Discharge: HOME | End: 2021-11-07
Attending: EMERGENCY MEDICINE
Payer: COMMERCIAL

## 2021-11-07 VITALS
OXYGEN SATURATION: 100 % | HEIGHT: 64 IN | DIASTOLIC BLOOD PRESSURE: 67 MMHG | HEART RATE: 97 BPM | SYSTOLIC BLOOD PRESSURE: 121 MMHG | RESPIRATION RATE: 18 BRPM | BODY MASS INDEX: 18.78 KG/M2 | WEIGHT: 110 LBS

## 2021-11-07 DIAGNOSIS — M79.10 MYALGIA: Primary | ICD-10-CM

## 2021-11-07 DIAGNOSIS — Z20.822 ENCOUNTER FOR LABORATORY TESTING FOR COVID-19 VIRUS: ICD-10-CM

## 2021-11-07 PROCEDURE — U0005 INFEC AGEN DETEC AMPLI PROBE: HCPCS | Performed by: EMERGENCY MEDICINE

## 2021-11-07 PROCEDURE — 99283 EMERGENCY DEPT VISIT LOW MDM: CPT

## 2021-11-07 PROCEDURE — U0003 INFECTIOUS AGENT DETECTION BY NUCLEIC ACID (DNA OR RNA); SEVERE ACUTE RESPIRATORY SYNDROME CORONAVIRUS 2 (SARS-COV-2) (CORONAVIRUS DISEASE [COVID-19]), AMPLIFIED PROBE TECHNIQUE, MAKING USE OF HIGH THROUGHPUT TECHNOLOGIES AS DESCRIBED BY CMS-2020-01-R: HCPCS | Performed by: EMERGENCY MEDICINE

## 2021-11-07 PROCEDURE — 99284 EMERGENCY DEPT VISIT MOD MDM: CPT | Performed by: EMERGENCY MEDICINE

## 2021-11-08 LAB — SARS-COV-2 RNA RESP QL NAA+PROBE: NEGATIVE

## 2021-11-26 ENCOUNTER — HOSPITAL ENCOUNTER (EMERGENCY)
Facility: HOSPITAL | Age: 21
Discharge: HOME | End: 2021-11-26
Attending: EMERGENCY MEDICINE
Payer: COMMERCIAL

## 2021-11-26 VITALS
RESPIRATION RATE: 20 BRPM | HEART RATE: 104 BPM | OXYGEN SATURATION: 100 % | SYSTOLIC BLOOD PRESSURE: 118 MMHG | DIASTOLIC BLOOD PRESSURE: 78 MMHG | TEMPERATURE: 101.9 F

## 2021-11-26 DIAGNOSIS — R50.9 FEVER: Primary | ICD-10-CM

## 2021-11-26 DIAGNOSIS — J06.9 URI (UPPER RESPIRATORY INFECTION): ICD-10-CM

## 2021-11-26 PROCEDURE — U0005 INFEC AGEN DETEC AMPLI PROBE: HCPCS | Performed by: EMERGENCY MEDICINE

## 2021-11-26 PROCEDURE — U0003 INFECTIOUS AGENT DETECTION BY NUCLEIC ACID (DNA OR RNA); SEVERE ACUTE RESPIRATORY SYNDROME CORONAVIRUS 2 (SARS-COV-2) (CORONAVIRUS DISEASE [COVID-19]), AMPLIFIED PROBE TECHNIQUE, MAKING USE OF HIGH THROUGHPUT TECHNOLOGIES AS DESCRIBED BY CMS-2020-01-R: HCPCS | Performed by: EMERGENCY MEDICINE

## 2021-11-26 PROCEDURE — 99284 EMERGENCY DEPT VISIT MOD MDM: CPT | Performed by: EMERGENCY MEDICINE

## 2021-11-26 PROCEDURE — 99281 EMR DPT VST MAYX REQ PHY/QHP: CPT

## 2021-11-26 RX ORDER — ACETAMINOPHEN 325 MG/1
650 TABLET ORAL ONCE
Status: COMPLETED | OUTPATIENT
Start: 2021-11-26 | End: 2021-11-26

## 2021-11-26 RX ADMIN — ACETAMINOPHEN 650 MG: 325 TABLET, FILM COATED ORAL at 19:27

## 2021-11-28 LAB — SARS-COV-2 RNA RESP QL NAA+PROBE: NEGATIVE

## 2022-03-20 ENCOUNTER — HOSPITAL ENCOUNTER (EMERGENCY)
Facility: HOSPITAL | Age: 22
Discharge: HOME | End: 2022-03-20
Attending: EMERGENCY MEDICINE
Payer: COMMERCIAL

## 2022-03-20 VITALS
TEMPERATURE: 97.2 F | OXYGEN SATURATION: 100 % | DIASTOLIC BLOOD PRESSURE: 75 MMHG | HEART RATE: 83 BPM | RESPIRATION RATE: 20 BRPM | SYSTOLIC BLOOD PRESSURE: 118 MMHG

## 2022-03-20 DIAGNOSIS — R10.31 RIGHT GROIN PAIN: Primary | ICD-10-CM

## 2022-03-20 LAB
BILIRUB UR QL STRIP: NEGATIVE
CLARITY UR: NORMAL
COLOR UR: YELLOW
EXT PREG TEST URINE: NEGATIVE
EXT. CONTROL ED NAV: NORMAL
GLUCOSE UR STRIP-MCNC: NEGATIVE MG/DL
HGB UR QL STRIP.AUTO: NEGATIVE
KETONES UR STRIP-MCNC: NEGATIVE MG/DL
LEUKOCYTE ESTERASE UR QL STRIP: NEGATIVE
NITRITE UR QL STRIP: NEGATIVE
PH UR STRIP.AUTO: 5.5 [PH]
PROT UR STRIP-MCNC: NEGATIVE MG/DL
SP GR UR STRIP.AUTO: >=1.03 (ref 1–1.03)
UROBILINOGEN UR QL STRIP.AUTO: 0.2 E.U./DL

## 2022-03-20 PROCEDURE — 99284 EMERGENCY DEPT VISIT MOD MDM: CPT

## 2022-03-20 PROCEDURE — 99284 EMERGENCY DEPT VISIT MOD MDM: CPT | Performed by: EMERGENCY MEDICINE

## 2022-03-20 PROCEDURE — 81025 URINE PREGNANCY TEST: CPT | Performed by: EMERGENCY MEDICINE

## 2022-03-20 PROCEDURE — 81003 URINALYSIS AUTO W/O SCOPE: CPT | Performed by: EMERGENCY MEDICINE

## 2022-03-20 RX ORDER — NAPROXEN 500 MG/1
500 TABLET ORAL ONCE
Status: COMPLETED | OUTPATIENT
Start: 2022-03-20 | End: 2022-03-20

## 2022-03-20 RX ORDER — NAPROXEN 500 MG/1
500 TABLET ORAL 2 TIMES DAILY WITH MEALS
Qty: 10 TABLET | Refills: 0 | Status: SHIPPED | OUTPATIENT
Start: 2022-03-20 | End: 2022-06-03 | Stop reason: ALTCHOICE

## 2022-03-20 RX ORDER — ONDANSETRON 2 MG/ML
4 INJECTION INTRAMUSCULAR; INTRAVENOUS ONCE
Status: DISCONTINUED | OUTPATIENT
Start: 2022-03-20 | End: 2022-03-20

## 2022-03-20 RX ORDER — HYDROMORPHONE HCL/PF 1 MG/ML
1 SYRINGE (ML) INJECTION ONCE
Status: DISCONTINUED | OUTPATIENT
Start: 2022-03-20 | End: 2022-03-20

## 2022-03-20 RX ORDER — ACETAMINOPHEN 325 MG/1
650 TABLET ORAL ONCE
Status: COMPLETED | OUTPATIENT
Start: 2022-03-20 | End: 2022-03-20

## 2022-03-20 RX ADMIN — ACETAMINOPHEN 650 MG: 325 TABLET, FILM COATED ORAL at 22:25

## 2022-03-20 RX ADMIN — NAPROXEN 500 MG: 500 TABLET ORAL at 22:25

## 2022-03-21 NOTE — ED PROVIDER NOTES
History  Chief Complaint   Patient presents with    Abdominal Pain     RLQ Groin pain for a couple of days, no vomiting or diarrhea  denies any urinary symptoms     25 yo female c/o right groin pain off and on x 2-3 days  No injury  No associated symptoms  Denies nausea, vomiting, diarrhea, dysuria  Denies vaginal bleeding or discharge  No belly or back pain  No swelling or bulges noted  History provided by:  Patient   used: No    Abdominal Pain  Associated symptoms: no chest pain, no cough, no diarrhea, no dysuria, no fever, no nausea, no shortness of breath and no vomiting        Prior to Admission Medications   Prescriptions Last Dose Informant Patient Reported? Taking? albuterol (PROVENTIL HFA,VENTOLIN HFA) 90 mcg/act inhaler   No No   Sig: Inhale 2 puffs every 6 (six) hours as needed for wheezing or shortness of breath  ferrous sulfate 325 (65 Fe) mg tablet   No No   Sig: Take 1 tablet (325 mg total) by mouth daily   Patient not taking: Reported on 9/13/2021   misoprostol (CYTOTEC) 200 mcg tablet  Self No No   Sig: Take 4 tablets (800 mcg total) by mouth once for 1 dose   Patient taking differently: Take 800 mcg by mouth once Patient states did not    naproxen (NAPROSYN) 500 mg tablet   No No   Sig: Take 1 tablet (500 mg total) by mouth 2 (two) times a day with meals   Patient not taking: Reported on 6/4/2021   naproxen (NAPROSYN) 500 mg tablet   No No   Sig: Take 1 tablet (500 mg total) by mouth 2 (two) times a day with meals   Patient not taking: Reported on 9/13/2021      Facility-Administered Medications: None       Past Medical History:   Diagnosis Date    Anemia     Asthma     Seasonal allergies        History reviewed  No pertinent surgical history  History reviewed  No pertinent family history  I have reviewed and agree with the history as documented      E-Cigarette/Vaping    E-Cigarette Use Never User      E-Cigarette/Vaping Substances    Nicotine No  THC No     CBD No     Flavoring No     Other No     Unknown No      Social History     Tobacco Use    Smoking status: Never Smoker    Smokeless tobacco: Never Used   Vaping Use    Vaping Use: Never used   Substance Use Topics    Alcohol use: No    Drug use: Yes     Types: Marijuana       Review of Systems   Constitutional: Negative  Negative for fever  HENT: Negative  Eyes: Negative  Respiratory: Negative  Negative for cough and shortness of breath  Cardiovascular: Negative  Negative for chest pain  Gastrointestinal: Negative for abdominal pain, diarrhea, nausea and vomiting  Genitourinary: Negative  Negative for dysuria and flank pain  Right femoral groin pain   Musculoskeletal: Negative  Negative for back pain and myalgias  Skin: Negative  Negative for rash  Neurological: Negative  Negative for dizziness and headaches  Hematological: Does not bruise/bleed easily  Psychiatric/Behavioral: Negative  All other systems reviewed and are negative  Physical Exam  Physical Exam  Vitals and nursing note reviewed  Constitutional:       General: She is not in acute distress  Appearance: She is well-developed and normal weight  She is not ill-appearing, toxic-appearing or diaphoretic  HENT:      Head: Normocephalic and atraumatic  Right Ear: External ear normal       Left Ear: External ear normal    Eyes:      General: No scleral icterus  Conjunctiva/sclera: Conjunctivae normal    Cardiovascular:      Rate and Rhythm: Normal rate and regular rhythm  Heart sounds: Normal heart sounds  No murmur heard  Pulmonary:      Effort: Pulmonary effort is normal  No respiratory distress  Breath sounds: Normal breath sounds  Abdominal:      General: Bowel sounds are normal  There is no distension  Palpations: Abdomen is soft  Tenderness: There is no abdominal tenderness  There is no right CVA tenderness or left CVA tenderness        Hernia: No hernia is present  Comments: Shoddy small adenopathy palpable in both femoral creases  + mild ttp right femoral crease  No hernia palp or visible lying or standing  Musculoskeletal:         General: No deformity  Normal range of motion  Cervical back: Normal range of motion and neck supple  Right lower leg: No edema  Left lower leg: No edema  Skin:     General: Skin is warm and dry  Coloration: Skin is not pale  Findings: No rash  Neurological:      General: No focal deficit present  Mental Status: She is alert and oriented to person, place, and time  Motor: No weakness        Gait: Gait normal    Psychiatric:         Mood and Affect: Mood normal          Behavior: Behavior normal          Vital Signs  ED Triage Vitals   Temperature Pulse Respirations Blood Pressure SpO2   03/20/22 2134 03/20/22 2136 03/20/22 2134 03/20/22 2134 03/20/22 2134   (!) 97 2 °F (36 2 °C) 83 20 118/75 100 %      Temp Source Heart Rate Source Patient Position - Orthostatic VS BP Location FiO2 (%)   03/20/22 2134 03/20/22 2134 03/20/22 2134 03/20/22 2134 --   Tympanic Monitor Sitting Right arm       Pain Score       03/20/22 2134       8           Vitals:    03/20/22 2134 03/20/22 2136   BP: 118/75    Pulse:  83   Patient Position - Orthostatic VS: Sitting          Visual Acuity      ED Medications  Medications   naproxen (NAPROSYN) tablet 500 mg (has no administration in time range)   acetaminophen (TYLENOL) tablet 650 mg (has no administration in time range)       Diagnostic Studies  Results Reviewed     Procedure Component Value Units Date/Time    UA (URINE) with reflex to Scope [[id number]] Collected: 03/20/22 2158    Lab Status: Final result Specimen: Urine, Other Updated: 03/20/22 2209     Color, UA Yellow     Clarity, UA Slightly Cloudy     Specific Gravity, UA >=1 030     pH, UA 5 5     Leukocytes, UA Negative     Nitrite, UA Negative     Protein, UA Negative mg/dl      Glucose, UA Negative mg/dl      Ketones, UA Negative mg/dl      Urobilinogen, UA 0 2 E U /dl      Bilirubin, UA Negative     Blood, UA Negative    POCT pregnancy, urine [[id number]]  (Normal) Resulted: 03/20/22 2203    Lab Status: Final result Updated: 03/20/22 2203     EXT PREG TEST UR (Ref: Negative) negative     Control valid                 No orders to display              Procedures  Procedures         ED Course                                             MDM  Number of Diagnoses or Management Options  Right groin pain  Diagnosis management comments: Pain nonspecific with no other symptoms, UA negative, not pregnant  Perhaps pulled groin muscle  Advised tylenol/naprosyn, ice/heat, rest and see if it works itself out  Advised follow up if worsening or develop other symptoms  Disposition  Final diagnoses:   Right groin pain     Time reflects when diagnosis was documented in both MDM as applicable and the Disposition within this note     Time User Action Codes Description Comment    3/38/7581 73:35 PM Cass PATHAK Add [O04 03] Right groin pain       ED Disposition     ED Disposition Condition Date/Time Comment    Discharge Stable Sun Mar 20, 2022 10:14 PM Christel Flores discharge to home/self care  Follow-up Information     Follow up With Specialties Details Why Contact Info Additional Information    [facility]  If symptoms worsen [address] [address]  [address] [address] Emergency Department, [address], [address]          Patient's Medications   Discharge Prescriptions    NAPROXEN (NAPROSYN) 500 MG TABLET    Take 1 tablet (500 mg total) by mouth 2 (two) times a day with meals       Start Date: 3/20/2022 End Date: --       Order Dose: 500 mg       Quantity: 10 tablet    Refills: 0       No discharge procedures on file      PDMP Review     None          ED Provider  Electronically Signed by           Leeann Morales MD  00/15/97 9589

## 2022-03-21 NOTE — DISCHARGE INSTRUCTIONS
Your pregnancy test is negative  You don't have a urinary tract infection  Alternate ice and heat to area and use Naprosyn as prescribed  You can also use tylenol as needed for pain  Follow up with doctor if worsening pain, vomiting, discharge, fever, swelling or rash

## 2022-03-31 ENCOUNTER — ANNUAL EXAM (OUTPATIENT)
Dept: FAMILY MEDICINE CLINIC | Facility: CLINIC | Age: 22
End: 2022-03-31

## 2022-03-31 VITALS
DIASTOLIC BLOOD PRESSURE: 76 MMHG | HEART RATE: 76 BPM | BODY MASS INDEX: 19.26 KG/M2 | SYSTOLIC BLOOD PRESSURE: 110 MMHG | OXYGEN SATURATION: 99 % | WEIGHT: 112.8 LBS | TEMPERATURE: 97.8 F | HEIGHT: 64 IN | RESPIRATION RATE: 18 BRPM

## 2022-03-31 DIAGNOSIS — Z11.3 SCREENING FOR STD (SEXUALLY TRANSMITTED DISEASE): Primary | ICD-10-CM

## 2022-03-31 DIAGNOSIS — Z11.4 SCREENING FOR HIV (HUMAN IMMUNODEFICIENCY VIRUS): ICD-10-CM

## 2022-03-31 DIAGNOSIS — Z11.59 NEED FOR HEPATITIS C SCREENING TEST: ICD-10-CM

## 2022-03-31 DIAGNOSIS — J45.40 MODERATE PERSISTENT ASTHMA WITHOUT COMPLICATION: ICD-10-CM

## 2022-03-31 DIAGNOSIS — Z12.4 SCREENING FOR CERVICAL CANCER: ICD-10-CM

## 2022-03-31 NOTE — PROGRESS NOTES
Annual GYN Visit    Subjective      Mamadou Snyder is a 24 y o  female who presents for annual well woman exam     GYN:  · Denies vaginal discharge, labial erythema or lesions, dyspareunia  · Menarche at 6  · Menses are regular, q 28-30 days, lasting 4 days  · Contraception:    · Patient is sexually active with male partner - 1 partner in the past year   · Chemic  gynecologic surgeries  OB:  ·  female    :  · Denies dysuria, urinary frequency or urgency  · Denies hematuria, flank pain, incontinence  Breast:  · Denies breast mass, skin changes, dimpling, reddening, nipple retraction  · Denies breast discharge  · Patient does do monthly breast exams  · Patient does not have a family history of breast, endometrial, or ovarian ca  General:  · Diet: moderate  · Exercise: occasional  · Work: none  · ETOH use: none  · Tobacco use: none  · Recreational drug use: daily THC use    Screening:  · Cervical cancer: first screening today  · STD screening: no      Review of Systems  Pertinent items are noted in HPI  Objective      LMP 2021     General:   alert, appears stated age and cooperative   Heart: regular rate and rhythm, S1, S2 normal, no murmur, click, rub or gallop   Lungs: clear to auscultation bilaterally   Abdomen: soft, non-tender, without masses or organomegaly   Vulva: normal   Vagina: Blood in vault          A/P     Unable to perform Pap today as there was blood in the vaginal vault  Patient to RTO in 1 week for Pap and Gc/Chlamyida with bimanual only (NOT ANNUAL GYN)    [id number]  I have discussed the importance of monthly self-breast exams, exercise and healthy diet as well as adequate intake of calcium and vitamin D  The patient desires STD testing  Safe sex practices have been discussed  The patient has had the Gardasil vaccine series, which is recommended for patients from 526 years of age  The current ASCCP guidelines were reviewed    The low risk patient will receive pap smear screening every 3 years  High risk patients will be triaged based on previous pap smear results, which have been reviewed  I emphasized the importance of an annual pelvic and breast exam   All questions have been answered to her satisfaction      D/w Dr Carlos Falcon

## 2022-04-01 RX ORDER — ALBUTEROL SULFATE 90 UG/1
2 AEROSOL, METERED RESPIRATORY (INHALATION) EVERY 6 HOURS PRN
Qty: 6.7 G | Refills: 0 | Status: SHIPPED | OUTPATIENT
Start: 2022-04-01 | End: 2022-06-03 | Stop reason: ALTCHOICE

## 2022-04-27 ENCOUNTER — HOSPITAL ENCOUNTER (EMERGENCY)
Facility: HOSPITAL | Age: 22
Discharge: HOME | End: 2022-04-28
Attending: EMERGENCY MEDICINE
Payer: COMMERCIAL

## 2022-04-27 ENCOUNTER — APPOINTMENT (EMERGENCY)
Dept: RADIOLOGY | Facility: HOSPITAL | Age: 22
End: 2022-04-27
Payer: COMMERCIAL

## 2022-04-27 DIAGNOSIS — R10.9 ABDOMINAL PAIN AFFECTING PREGNANCY: Primary | ICD-10-CM

## 2022-04-27 DIAGNOSIS — O26.899 ABDOMINAL PAIN AFFECTING PREGNANCY: Primary | ICD-10-CM

## 2022-04-27 LAB
ABO GROUP BLD: NORMAL
B-HCG SERPL-ACNC: 6023 MIU/ML
BACTERIA UR QL AUTO: ABNORMAL /HPF
BILIRUB UR QL STRIP: NEGATIVE
CLARITY UR: ABNORMAL
COLOR UR: ABNORMAL
EXT PREG TEST URINE: POSITIVE
EXT. CONTROL ED NAV: ABNORMAL
GLUCOSE UR STRIP-MCNC: NEGATIVE MG/DL
HGB UR QL STRIP.AUTO: NEGATIVE
KETONES UR STRIP-MCNC: ABNORMAL MG/DL
LEUKOCYTE ESTERASE UR QL STRIP: ABNORMAL
NITRITE UR QL STRIP: NEGATIVE
NON-SQ EPI CELLS URNS QL MICRO: ABNORMAL /HPF
PH UR STRIP.AUTO: 5.5 [PH]
PROT UR STRIP-MCNC: NEGATIVE MG/DL
RBC #/AREA URNS AUTO: ABNORMAL /HPF
RH BLD: POSITIVE
SP GR UR STRIP.AUTO: >=1.03 (ref 1–1.03)
UROBILINOGEN UR QL STRIP.AUTO: 0.2 E.U./DL
WBC #/AREA URNS AUTO: ABNORMAL /HPF

## 2022-04-27 PROCEDURE — 86900 BLOOD TYPING SEROLOGIC ABO: CPT | Performed by: EMERGENCY MEDICINE

## 2022-04-27 PROCEDURE — 81025 URINE PREGNANCY TEST: CPT

## 2022-04-27 PROCEDURE — 86901 BLOOD TYPING SEROLOGIC RH(D): CPT | Performed by: EMERGENCY MEDICINE

## 2022-04-27 PROCEDURE — 81001 URINALYSIS AUTO W/SCOPE: CPT

## 2022-04-27 PROCEDURE — 36415 COLL VENOUS BLD VENIPUNCTURE: CPT | Performed by: EMERGENCY MEDICINE

## 2022-04-27 PROCEDURE — 76815 OB US LIMITED FETUS(S): CPT

## 2022-04-27 PROCEDURE — 99284 EMERGENCY DEPT VISIT MOD MDM: CPT

## 2022-04-27 PROCEDURE — 84702 CHORIONIC GONADOTROPIN TEST: CPT | Performed by: EMERGENCY MEDICINE

## 2022-04-27 RX ORDER — ONDANSETRON 4 MG/1
4 TABLET, ORALLY DISINTEGRATING ORAL ONCE
Status: COMPLETED | OUTPATIENT
Start: 2022-04-27 | End: 2022-04-27

## 2022-04-27 RX ADMIN — ONDANSETRON 4 MG: 4 TABLET, ORALLY DISINTEGRATING ORAL at 22:06

## 2022-04-28 VITALS
RESPIRATION RATE: 16 BRPM | SYSTOLIC BLOOD PRESSURE: 97 MMHG | OXYGEN SATURATION: 100 % | DIASTOLIC BLOOD PRESSURE: 60 MMHG | HEART RATE: 93 BPM | TEMPERATURE: 99.1 F

## 2022-04-28 PROCEDURE — 99284 EMERGENCY DEPT VISIT MOD MDM: CPT | Performed by: EMERGENCY MEDICINE

## 2022-04-28 RX ORDER — CEPHALEXIN 250 MG/1
250 CAPSULE ORAL EVERY 8 HOURS SCHEDULED
Qty: 15 CAPSULE | Refills: 0 | Status: SHIPPED | OUTPATIENT
Start: 2022-04-28 | End: 2022-05-03

## 2022-04-28 RX ORDER — ACETAMINOPHEN 500 MG
1000 TABLET ORAL EVERY 8 HOURS PRN
Qty: 40 TABLET | Refills: 0 | Status: SHIPPED | OUTPATIENT
Start: 2022-04-28 | End: 2022-06-03 | Stop reason: ALTCHOICE

## 2022-04-28 RX ORDER — ONDANSETRON 4 MG/1
4 TABLET, ORALLY DISINTEGRATING ORAL EVERY 6 HOURS PRN
Qty: 20 TABLET | Refills: 0 | Status: SHIPPED | OUTPATIENT
Start: 2022-04-28

## 2022-04-28 NOTE — ED PROVIDER NOTES
Final Diagnosis:  1  Abdominal pain affecting pregnancy        Chief Complaint   Patient presents with    Abdominal Pain     mid abd pain with nausea today, no diarrhea or constipation     HPI  23 yo presents with abd pain, lower, and nausea  Sexually active  Prior miscarriage 1 year ago  No prior pregnancies  Pregnant here  No urinary symptoms  Soft abd  Last menstrual period 4 weeks ago  Transvaginal US with gestrational sac intrauterine  Send for delta and OB f/u  Dirty catch of urine  Will offer and send keflex  Can await culture  - No language barrier    - History obtained from patient  - There are no limitations to the history obtained  - Previous charting underwent limited review with attention to last ED visits, labs, ekgs, and prior imaging  PMH:   has a past medical history of Anemia, Asthma, and Seasonal allergies  PSH:   has no past surgical history on file  Social History:  Presents with BF    ROS:    Pertinent positives/negatives:   Review of Systems   Gastrointestinal: Positive for abdominal pain and nausea  CONSTITUTIONAL:  No dizziness  No weakness  No unexpected weight loss  EYES:  No pain, erythema, or discharge  No loss of vision  ENT:  No tinnitus, decreased hearing  No epistaxis/purulent drainage  No voice change, airway closing, trismus  CARDIOVASCULAR:  No chest pain  No palpitations  No new lower extremity edema  RESPIRATORY:  No purulent cough  No hemoptysis  No dyspnea  No paroxysmal nocturnal dyspnea  No stridor, audible wheezing bedside  GASTROINTESTINAL:  Normal appetite  No vomiting, diarrhea  No pain  No bloating  No melena  GENITOURINARY:  No frequency, urgency, nocturia  No hematuria or dysuria  No discharge  No sores/adenopathy  MUSCULOSKELETAL:  No arthralgias or myalgias that are new  INTEGUMENTARY:  No swelling  No unexpected contusions  No abrasions  No lymphangitis  NEUROLOGIC:  No meningismus  No numbness of the extremities   No new focal weakness  No postural instability  PSYCHIATRIC:  No SI HI AVH  HEMATOLOGICAL:  No bleeding  No petechiae  No bruising  ALLERGIES:  No urticaria  No sudden abd cramping  No stridor  PE:     Physical exam highlights:   Physical Exam       Vitals:    04/27/22 2047 04/28/22 0101   BP: 117/68 97/60   BP Location: Right arm    Pulse: (!) 107 93   Resp: 20 16   Temp: 99 1 °F (37 3 °C)    TempSrc: Tympanic    SpO2: 99% 100%     Vitals reviewed by me  Nursing note reviewed  Chaperone present for all sensitive exam   Const: No acute distress  Alert  Nontoxic  Not diaphoretic  HEENT: External ears normal  No protrusion drainage swelling  Nose normal  No drainage/traumatic deformity  MMM  Mouth with baseline/symmetric movement  No trismus  Eyes: No squinting  No icterus  Tracks through the room with normal EOM  No tearing/swelling/drainage  Neck: ROM normal  No rigidity  No meningismus  Cards: Rate as per vitals  Compared to monitor sinus unless documented above  Regular  Well perfused  Pulm: able to verbalize without additional effort  Effort and excursion normal  No disress  No audible wheezing/ stridor  Normal resp rate  Abd: No distension beyond baseline  No fluctuant wave  Patient without peritoneal pain with shifting/bumping the bed  MSK: ROM normal and baseline  No deformity  Skin: No new rashes visible  Well perfused  Neuro: Nonfocal  Baseline  CN grossly intact  Moving all four with coordination  Psych: Normal behavior and affect  A:  - Nursing note reviewed  Ddx and MDM  Pregnancy  Pelvic US -     Delta        asymptoamtic bactiuria vs skin yo - f/u culture vs keflex                        US OB pregnancy limited with transvaginal   Final Result      Single low-lying intrauterine gestational sac with no fetal pole  Findings may represent early normal pregnancy however cannot rule out abnormal pregnancy  Correlation is recommended        Right-sided corpus luteal cyst  Workstation performed: [id number]           Orders Placed This Encounter   Procedures    US OB pregnancy limited with transvaginal    UA w Reflex to Microscopic w Reflex to Culture    Urine Microscopic    hCG, quantitative    hCG, quantitative    POCT pregnancy, urine    ABO/Rh     Labs Reviewed   UA W REFLEX TO MICROSCOPIC WITH REFLEX TO CULTURE - Abnormal       Result Value Ref Range Status    Color, UA Light Yellow   Final    Clarity, UA Slightly Cloudy   Final    Specific Gravity, UA >=1 030  1 000 - 1 030 Final    pH, UA 5 5  5 0, 5 5, 6 0, 6 5, 7 0, 7 5, 8 0, 8 5, 9 0 Final    Leukocytes, UA Trace (*) Negative Final    Nitrite, UA Negative  Negative Final    Protein, UA Negative  Negative mg/dl Final    Glucose, UA Negative  Negative mg/dl Final    Ketones, UA Trace (*) Negative mg/dl Final    Urobilinogen, UA 0 2  0 2, 1 0 E U /dl E U /dl Final    Bilirubin, UA Negative  Negative Final    Blood, UA Negative  Negative Final   URINE MICROSCOPIC - Abnormal    RBC, UA 0-1  None Seen, 0-1, 1-2, 2-4, 0-5 /hpf Final    WBC, UA 4-10 (*) None Seen, 0-1, 1-2, 0-5, 2-4 /hpf Final    Epithelial Cells Moderate (*) None Seen, Occasional /hpf Final    Bacteria, UA Occasional  None Seen, Occasional /hpf Final   HCG, QUANTITATIVE - Abnormal    HCG, Quant 6,023 (*) <=6 mIU/mL Final    Narrative:      Expected Ranges:     Approximate               Approximate HCG  Gestation age          Concentration ( mIU/mL)  _____________          ______________________   Kristina Valenzuelavladimirchandrika                      HCG values  0 2-1                       5-50  1-2                           2-3                         100-5000  3-4                         500-90362  4-5                         1000-25893  5-6                         [id number]  6-8                         [id number]  8-12                        [id number]     POCT PREGNANCY, URINE - Abnormal    EXT PREG TEST UR (Ref: Negative) positive   Final    Control valid   Final ABO/RH    ABO Grouping A   Final    Rh Factor Positive   Final       Final Diagnosis:  1  Abdominal pain affecting pregnancy        P:  - hospital tx includes   Medications   ondansetron (ZOFRAN-ODT) dispersible tablet 4 mg (4 mg Oral Given 4/27/22 2206)         - disposition  Time reflects when diagnosis was documented in both MDM as applicable and the Disposition within this note     Time User Action Codes Description Comment    4/28/2022 12:47 AM Umair Mascorro Add [O26 899,  R10 9] Abdominal pain affecting pregnancy       ED Disposition     ED Disposition Condition Date/Time Comment    Discharge Stable Thu Apr 28, 2022 12:46 AM Martell Gottron discharge to home/self care  Follow-up Information     Follow up With Specialties Details Why Contact Info Additional Information    [facility] and Gynecology   [address] [place] [address] [address]  [facility], [address] [place] [address], [id number] [phone]          - patient will call their PCP to let them know they were in the emergency department   We discuss return precautions       - additional tx intended, if consistent with primary provider:  - patient to follow with :      Discharge Medication List as of 4/28/2022 12:48 AM      START taking these medications    Details   acetaminophen (TYLENOL) 500 mg tablet Take 2 tablets (1,000 mg total) by mouth every 8 (eight) hours as needed for mild pain, Starting Thu 4/28/2022, Print      ondansetron (Zofran ODT) 4 mg disintegrating tablet Take 1 tablet (4 mg total) by mouth every 6 (six) hours as needed for nausea or vomiting, Starting Thu 4/28/2022, Print         CONTINUE these medications which have NOT CHANGED    Details   albuterol (PROVENTIL HFA,VENTOLIN HFA) 90 mcg/act inhaler Inhale 2 puffs every 6 (six) hours as needed for wheezing or shortness of breath, Starting Fri 4/1/2022, Print      ferrous sulfate 325 (65 Fe) mg tablet Take 1 tablet (325 mg total) by mouth daily, Starting Fri 5/7/2021, Normal      misoprostol (CYTOTEC) 200 mcg tablet Take 4 tablets (800 mcg total) by mouth once for 1 dose, Starting Tue 7/20/2021, Normal      !! naproxen (NAPROSYN) 500 mg tablet Take 1 tablet (500 mg total) by mouth 2 (two) times a day with meals, Starting Fri 5/7/2021, Normal      !! naproxen (NAPROSYN) 500 mg tablet Take 1 tablet (500 mg total) by mouth 2 (two) times a day with meals, Starting Mon 8/2/2021, Normal      !! naproxen (NAPROSYN) 500 mg tablet Take 1 tablet (500 mg total) by mouth 2 (two) times a day with meals, Starting Sun 3/20/2022, Normal       !! - Potential duplicate medications found  Please discuss with provider  Outpatient Discharge Orders   hCG, quantitative   Standing Status: Future Standing Exp  Date: 04/28/23     Prior to Admission Medications   Prescriptions Last Dose Informant Patient Reported? Taking?    albuterol (PROVENTIL HFA,VENTOLIN HFA) 90 mcg/act inhaler   No No   Sig: Inhale 2 puffs every 6 (six) hours as needed for wheezing or shortness of breath   ferrous sulfate 325 (65 Fe) mg tablet   No No   Sig: Take 1 tablet (325 mg total) by mouth daily   Patient not taking: Reported on 9/13/2021   misoprostol (CYTOTEC) 200 mcg tablet  Self No No   Sig: Take 4 tablets (800 mcg total) by mouth once for 1 dose   Patient taking differently: Take 800 mcg by mouth once Patient states did not    naproxen (NAPROSYN) 500 mg tablet   No No   Sig: Take 1 tablet (500 mg total) by mouth 2 (two) times a day with meals   Patient not taking: Reported on 6/4/2021   naproxen (NAPROSYN) 500 mg tablet   No No   Sig: Take 1 tablet (500 mg total) by mouth 2 (two) times a day with meals   Patient not taking: Reported on 9/13/2021   naproxen (NAPROSYN) 500 mg tablet   No No   Sig: Take 1 tablet (500 mg total) by mouth 2 (two) times a day with meals   Patient not taking: Reported on 3/31/2022       Facility-Administered Medications: None       Portions of the record may have been created with voice recognition software  Occasional wrong word or "sound a like" substitutions may have occurred due to the inherent limitations of voice recognition software  Read the chart carefully and recognize, using context, where substitutions have occurred      Electronically signed by:  MD Vlad Fernandez MD  04/28/22 9704

## 2022-04-28 NOTE — DISCHARGE INSTRUCTIONS
Have your blood rechecked in 2 days    Follow with your obstetrician    Take tylenol for pain, zofran for nausea    Return with worsening

## 2022-06-03 ENCOUNTER — ROUTINE PRENATAL (OUTPATIENT)
Dept: OBGYN CLINIC | Facility: CLINIC | Age: 22
End: 2022-06-03

## 2022-06-03 VITALS — SYSTOLIC BLOOD PRESSURE: 110 MMHG | BODY MASS INDEX: 19.05 KG/M2 | WEIGHT: 111 LBS | DIASTOLIC BLOOD PRESSURE: 60 MMHG

## 2022-06-03 DIAGNOSIS — Z34.01 ENCOUNTER FOR SUPERVISION OF NORMAL FIRST PREGNANCY IN FIRST TRIMESTER: Primary | ICD-10-CM

## 2022-06-03 PROCEDURE — NOBC: Performed by: NURSE PRACTITIONER

## 2022-06-03 RX ORDER — DOCOSAHEXAENOIC ACID 200 MG
1 CAPSULE ORAL DAILY
COMMUNITY

## 2022-06-03 NOTE — LETTER
Julianna Stephenson    2000      To Whom it May Concern:     Is under my professional care  She is currently 9 weeks and 5 days pregnant   Her estimated due date is0 2023   If you have any questions or concerns, please don't hesitate to call           Sincerely,

## 2022-06-03 NOTE — PROGRESS NOTES
INITIAL OB VISIT: Pt presents to our office after a positive home UPT  Medical History: Asthma, seasonal allergies, Varicella vaccine as child  Denies any hx of MRSA  Surgical History: None    Medications: Prenatal with DHA, Zofran 4mg, Albuterol inhaler    Social History: Denies any alcohol, smoking, or drug use in pregnancy  Does NOT have cats  Has not been outside the country in the last 6 months  OB/GYN History: Menses monthly, regular LMP: 3/27/2022  G 2 P 0   SAB 7/31/2021    TAUS: [place] IUP at 9w6d based on CRL 2 97cm (+) FHR 142bpm S=D MAGY: 1/1/2023 based on LMP     Denies any N/V, HA, Cramping, VB, LOF, Edema, Domestic Violence, Smoking  No FM yet  Tolerating PNV  Hx of sister with sickle cell disease and nephew with G6PD  Consent and forms signed  Sequential screen reviewed  Pap and cultures done  Pregnancy essentials guide reviewed online  Plans on breastfeeding  Rx for initial prenatal labs given  Referral for MFM placed  RTO 4 weeks or sooner as needed

## 2022-06-10 LAB
ABO GROUP BLD: NORMAL
BACTERIA UR CULT: NORMAL
BASOPHILS # BLD AUTO: 0 X10E3/UL (ref 0–0.2)
BASOPHILS NFR BLD AUTO: 0 %
BLD GP AB SCN SERPL QL: NEGATIVE
CF COMMENT: NORMAL
CFTR MUT ANL BLD/T: NORMAL
EOSINOPHIL # BLD AUTO: 0.5 X10E3/UL (ref 0–0.4)
EOSINOPHIL NFR BLD AUTO: 7 %
ERYTHROCYTE [DISTWIDTH] IN BLOOD BY AUTOMATED COUNT: 21.1 % (ref 11.7–15.4)
HCT VFR BLD AUTO: 33.1 % (ref 34–46.6)
HCV AB S/CO SERPL IA: <0.1 S/CO RATIO (ref 0–0.9)
HGB A MFR BLD: 2.1 % (ref 1.8–3.2)
HGB A MFR BLD: 97.9 % (ref 96.4–98.8)
HGB BLD-MCNC: 9.9 G/DL (ref 11.1–15.9)
HGB F MFR BLD: 0 % (ref 0–2)
HGB FRACT BLD-IMP: NORMAL
HGB S MFR BLD: 0 %
HIV 1+2 AB+HIV1 P24 AG SERPL QL IA: NON REACTIVE
IMM GRANULOCYTES # BLD: 0 X10E3/UL (ref 0–0.1)
IMM GRANULOCYTES NFR BLD: 0 %
LYMPHOCYTES # BLD AUTO: 2.1 X10E3/UL (ref 0.7–3.1)
LYMPHOCYTES NFR BLD AUTO: 30 %
Lab: NO GROWTH
MCH RBC QN AUTO: 21.3 PG (ref 26.6–33)
MCHC RBC AUTO-ENTMCNC: 29.9 G/DL (ref 31.5–35.7)
MCV RBC AUTO: 71 FL (ref 79–97)
MONOCYTES # BLD AUTO: 0.7 X10E3/UL (ref 0.1–0.9)
MONOCYTES NFR BLD AUTO: 10 %
NEUTROPHILS # BLD AUTO: 3.7 X10E3/UL (ref 1.4–7)
NEUTROPHILS NFR BLD AUTO: 53 %
PLATELET # BLD AUTO: 473 X10E3/UL (ref 150–450)
RBC # BLD AUTO: 4.65 X10E6/UL (ref 3.77–5.28)
RH BLD: POSITIVE
RPR SER QL: NON REACTIVE
RUBV IGG SERPL IA-ACNC: 2.53 INDEX
WBC # BLD AUTO: 7.1 X10E3/UL (ref 3.4–10.8)

## 2022-06-13 LAB
CLINICAL INFO: NORMAL
ETHNIC BACKGROUND STATED: NORMAL
GENE MUT TESTED BLD/T: NORMAL
GENERAL COMMENTS:: NORMAL
LAB DIRECTOR NAME PROVIDER: NORMAL
REASON FOR REFERRAL (NARRATIVE): NORMAL
REF LAB TEST METHOD: NORMAL
SL AMB DISCLAIMER: NORMAL
SL AMB GENETIC COUNSELOR: NORMAL
SMN1 GENE MUT ANL BLD/T: NORMAL
SPECIMEN SOURCE: NORMAL

## 2022-07-01 ENCOUNTER — ROUTINE PRENATAL (OUTPATIENT)
Dept: PERINATAL CARE | Facility: CLINIC | Age: 22
End: 2022-07-01
Payer: COMMERCIAL

## 2022-07-01 VITALS
SYSTOLIC BLOOD PRESSURE: 121 MMHG | DIASTOLIC BLOOD PRESSURE: 67 MMHG | HEART RATE: 87 BPM | WEIGHT: 114.4 LBS | HEIGHT: 64 IN | BODY MASS INDEX: 19.53 KG/M2

## 2022-07-01 DIAGNOSIS — Z34.01 ENCOUNTER FOR SUPERVISION OF NORMAL FIRST PREGNANCY IN FIRST TRIMESTER: ICD-10-CM

## 2022-07-01 DIAGNOSIS — O36.80X0 ENCOUNTER TO DETERMINE FETAL VIABILITY OF PREGNANCY, SINGLE OR UNSPECIFIED FETUS: ICD-10-CM

## 2022-07-01 DIAGNOSIS — D57.3 SICKLE CELL TRAIT IN MOTHER AFFECTING PREGNANCY (HCC): Primary | ICD-10-CM

## 2022-07-01 DIAGNOSIS — Z3A.13 13 WEEKS GESTATION OF PREGNANCY: ICD-10-CM

## 2022-07-01 DIAGNOSIS — O99.019 SICKLE CELL TRAIT IN MOTHER AFFECTING PREGNANCY (HCC): Primary | ICD-10-CM

## 2022-07-01 DIAGNOSIS — Z36.82 ENCOUNTER FOR NUCHAL TRANSLUCENCY TESTING: ICD-10-CM

## 2022-07-01 PROCEDURE — 76801 OB US < 14 WKS SINGLE FETUS: CPT | Performed by: OBSTETRICS & GYNECOLOGY

## 2022-07-01 PROCEDURE — 36415 COLL VENOUS BLD VENIPUNCTURE: CPT | Performed by: OBSTETRICS & GYNECOLOGY

## 2022-07-01 PROCEDURE — 76813 OB US NUCHAL MEAS 1 GEST: CPT | Performed by: OBSTETRICS & GYNECOLOGY

## 2022-07-01 PROCEDURE — 99212 OFFICE O/P EST SF 10 MIN: CPT | Performed by: OBSTETRICS & GYNECOLOGY

## 2022-07-01 RX ORDER — ASPIRIN 81 MG/1
162 TABLET ORAL DAILY
Qty: 180 TABLET | Refills: 3 | Status: SHIPPED | OUTPATIENT
Start: 2022-07-01 | End: 2022-08-31 | Stop reason: SDUPTHER

## 2022-07-01 NOTE — PROGRESS NOTES
CONSULT NOTE    Dara Judge, MARTIN  [address]  [facility],  [place]     Thank you for referring your Paresh Hanna for a Maternal-Fetal Medicine Consultation:  Below is my consultation  Thank you very much for requesting a consultation on this very nice patient for the indication of genetic screening  This is the patient's 2nd pregnancy  She has a history of asthma for which she utilizes albuterol infrequently  She has sickle cell trait  She had a first-trimester miscarriage last year not requiring surgical intervention  Her substance use history is unremarkable  Family medical history is significant for diabetes in family members and a sister with sickle cell disease  We discussed the options for genetic screening, including but not limited to first trimester screening, second trimester screening, combined first and second trimester screening, noninvasive prenatal screening (NIPS) for patients at high risk and diagnostic screening through the use of CVS and amniocentesis  We discussed the risks and benefits of each approach including the sensitivities and false positive rates as well as the difference between a screening test and a diagnostic test   At the conclusion of our discussion the patient elected noninvasive prenatal testing utilizing the [facility] Non-invasive prenatal screening (NIPS) test   The patient had this blood work drawn in the office and the results should be available approximately 7-10 days after her blood draw  Her results will be reported from [facility]  Given the patient's history of nulliparity and ethnicity, I recommend initiating low dose aspirin therapy  A recent meta-analysis yielded risk reductions of 24% for preeclampsia, 20% for intrauterine growth restriction, and 14% for  birth, with an absolute risk reduction of 2-5% for preeclampsia, one to 5% for intrauterine growth restriction, and 2-4% for  birth    In this study, there was no identified risk of harm to the mother or fetus but long-term evidence was somewhat limited  Given the overall safety profile and risk-benefit analysis, I recommend 162 mg of aspirin be taken Daily and discontinued at around 36 weeks gestation or 2-3 weeks prior to planned delivery  I reviewed these recommendations with the patient and answered all of her questions to apparent satisfaction  We reviewed the patient's history of sickle cell trait  The father of the baby has not yet been screened  I encouraged screening to determine reproductive risks  The patient has a sister with sickle cell disease therefore she is well aware of clinical ramifications  We discussed follow-up in detail and I recommend an anatomy ultrasound be scheduled for 20 weeks gestation  Thank you very much for allowing us to participate in the care of this very nice patient  Should you have any questions, please do not hesitate to contact our office  Please note, in addition to the time spent discussing the results of the ultrasound, I spent approximately 15 minutes of face-to-face time with the patient, greater than 50% of which was spent in counseling and the coordination of care for this patient  Portions of the record may have been created with voice recognition software  Occasional wrong word or "sound a like" substitutions may have occurred due to the inherent limitations of voice recognition software  Read the chart carefully and recognize, using context, where substitutions have occurred  Sumit Hernandez MD  Attending Physician, [place]

## 2022-07-01 NOTE — LETTER
July 1, 2022     MARTIN Byrd  [address] [facility]    Patient: Amaury Villagran   YOB: 2000   Date of Visit: 7/1/2022       Dear Dr Berry Haynes: Thank you for referring Gerson Jarvis to me for evaluation  Below are my notes for this consultation  If you have questions, please do not hesitate to call me  I look forward to following your patient along with you  Sincerely,        Narendra Alicia MD        CC: No Recipients  Narendra Alicia MD  7/1/2022 11:43 AM  Sign when Signing Visit  CONSULT NOTE    Salvatore DemetriMARTIN eagle  [address] [address]  [place],  [place] [address]     Thank you for referring your Amaury Villagran for a Maternal-Fetal Medicine Consultation:  Below is my consultation  Thank you very much for requesting a consultation on this very nice patient for the indication of genetic screening  This is the patient's 2nd pregnancy  She has a history of asthma for which she utilizes albuterol infrequently  She has sickle cell trait  She had a first-trimester miscarriage last year not requiring surgical intervention  Her substance use history is unremarkable  Family medical history is significant for diabetes in family members and a sister with sickle cell disease  We discussed the options for genetic screening, including but not limited to first trimester screening, second trimester screening, combined first and second trimester screening, noninvasive prenatal screening (NIPS) for patients at high risk and diagnostic screening through the use of CVS and amniocentesis    We discussed the risks and benefits of each approach including the sensitivities and false positive rates as well as the difference between a screening test and a diagnostic test   At the conclusion of our discussion the patient elected noninvasive prenatal testing utilizing the [facility] Non-invasive prenatal screening (NIPS) test   The patient had this blood work drawn in the office and the results should be available approximately 7-10 days after her blood draw  Her results will be reported from [facility]  Given the patient's history of nulliparity and ethnicity, I recommend initiating low dose aspirin therapy  A recent meta-analysis yielded risk reductions of 24% for preeclampsia, 20% for intrauterine growth restriction, and 14% for  birth, with an absolute risk reduction of 2-5% for preeclampsia, one to 5% for intrauterine growth restriction, and 2-4% for  birth  In this study, there was no identified risk of harm to the mother or fetus but long-term evidence was somewhat limited  Given the overall safety profile and risk-benefit analysis, I recommend 162 mg of aspirin be taken Daily and discontinued at around 36 weeks gestation or 2-3 weeks prior to planned delivery  I reviewed these recommendations with the patient and answered all of her questions to apparent satisfaction  We reviewed the patient's history of sickle cell trait  The father of the baby has not yet been screened  I encouraged screening to determine reproductive risks  The patient has a sister with sickle cell disease therefore she is well aware of clinical ramifications  We discussed follow-up in detail and I recommend an anatomy ultrasound be scheduled for 20 weeks gestation  Thank you very much for allowing us to participate in the care of this very nice patient  Should you have any questions, please do not hesitate to contact our office  Please note, in addition to the time spent discussing the results of the ultrasound, I spent approximately 15 minutes of face-to-face time with the patient, greater than 50% of which was spent in counseling and the coordination of care for this patient  Portions of the record may have been created with voice recognition software    Occasional wrong word or "sound a like" substitutions may have occurred due to the inherent limitations of voice recognition software  Read the chart carefully and recognize, using context, where substitutions have occurred  Sumit Gomez MD  Attending Physician, [place]

## 2022-07-01 NOTE — PROGRESS NOTES
Patient chose to have [facility] Non-invasive Prenatal Screen  Patient given brochure and is aware [facility] will contact patients insurance and coordinate coverage  Patient made aware she will need to respond to text message or e-mail from [facility] within 2 business days or testing will be run through insurance  Patient informed text message will come from area code  "415"  Provided [employer] # [phone] and web site : [email or web address]     2 vials of blood drawn from Right arm, patient tolerated blood draw without difficulty  Specimens labeled with patient identifiers (name, date of birth, specimen collection date), packed and sent via [employer]  Copy of lab order scanned to Epic media  Maternal Fetal Medicine will have results in approximately 7-10 business days and will call patient or notify via [address]  Patient aware viewing lab result online will reveal fetal sex If ordered  Patient verbalized understanding of all instructions and no questions at this time

## 2022-07-06 ENCOUNTER — INITIAL PRENATAL (OUTPATIENT)
Dept: OBGYN CLINIC | Facility: CLINIC | Age: 22
End: 2022-07-06

## 2022-07-06 VITALS — SYSTOLIC BLOOD PRESSURE: 110 MMHG | BODY MASS INDEX: 19.4 KG/M2 | DIASTOLIC BLOOD PRESSURE: 70 MMHG | WEIGHT: 113 LBS

## 2022-07-06 DIAGNOSIS — Z34.02 ENCOUNTER FOR SUPERVISION OF NORMAL FIRST PREGNANCY IN SECOND TRIMESTER: Primary | ICD-10-CM

## 2022-07-06 PROCEDURE — PNV: Performed by: NURSE PRACTITIONER

## 2022-07-06 NOTE — PROGRESS NOTES
OFFICE VISIT: Denies any N/V, HA, Cramping, VB, LOF, Edema, Domestic Violence, Smoking  No FM yet  Tolerating PNV  Did start iron  Will recheck CBC and iron panel with AFP lab work  Has anatomy scan scheduled  Urine neg/neg  Physical exam done and WNL  Pap with reflex HPV, GC/CT/Trich testing done and sent  RTO 4 weeks or sooner as needed

## 2022-07-08 LAB
BASOPHILS # BLD AUTO: 0 X10E3/UL (ref 0–0.2)
BASOPHILS NFR BLD AUTO: 1 %
C TRACH RRNA SPEC QL NAA+PROBE: NEGATIVE
CYTOLOGIST CVX/VAG CYTO: NORMAL
DX ICD CODE: NORMAL
EOSINOPHIL # BLD AUTO: 0.4 X10E3/UL (ref 0–0.4)
EOSINOPHIL NFR BLD AUTO: 4 %
ERYTHROCYTE [DISTWIDTH] IN BLOOD BY AUTOMATED COUNT: 21 % (ref 11.7–15.4)
HCT VFR BLD AUTO: 29.4 % (ref 34–46.6)
HGB BLD-MCNC: 8.9 G/DL (ref 11.1–15.9)
IMM GRANULOCYTES # BLD: 0.1 X10E3/UL (ref 0–0.1)
IMM GRANULOCYTES NFR BLD: 1 %
LYMPHOCYTES # BLD AUTO: 2.6 X10E3/UL (ref 0.7–3.1)
LYMPHOCYTES NFR BLD AUTO: 30 %
Lab: NORMAL
MCH RBC QN AUTO: 21.3 PG (ref 26.6–33)
MCHC RBC AUTO-ENTMCNC: 30.3 G/DL (ref 31.5–35.7)
MCV RBC AUTO: 70 FL (ref 79–97)
MONOCYTES # BLD AUTO: 0.9 X10E3/UL (ref 0.1–0.9)
MONOCYTES NFR BLD AUTO: 11 %
N GONORRHOEA RRNA SPEC QL NAA+PROBE: NEGATIVE
NEUTROPHILS # BLD AUTO: 4.7 X10E3/UL (ref 1.4–7)
NEUTROPHILS NFR BLD AUTO: 53 %
OTHER STN SPEC: NORMAL
OTHER STN SPEC: NORMAL
PATH REPORT.FINAL DX SPEC: NORMAL
PLATELET # BLD AUTO: 397 X10E3/UL (ref 150–450)
RBC # BLD AUTO: 4.18 X10E6/UL (ref 3.77–5.28)
SL AMB NOTE:: NORMAL
SL AMB SPECIMEN ADEQUACY: NORMAL
SL AMB TEST METHODOLOGY: NORMAL
T VAGINALIS RRNA SPEC QL NAA+PROBE: NEGATIVE
WBC # BLD AUTO: 8.6 X10E3/UL (ref 3.4–10.8)

## 2022-07-14 LAB
2ND TRIMESTER 4 SCREEN SERPL-IMP: NORMAL
AFP ADJ MOM SERPL: NORMAL
AFP INTERP AMN-IMP: NORMAL
AFP INTERP SERPL-IMP: NORMAL
AFP INTERP SERPL-IMP: NORMAL
AFP SERPL-MCNC: 40.1 NG/ML
AGE AT DELIVERY: 22.1 YR
GA METHOD: NORMAL
GA: 14.7 WEEKS
IDDM PATIENT QL: NO
MULTIPLE PREGNANCY: NO
NEURAL TUBE DEFECT RISK FETUS: NORMAL %

## 2022-07-18 DIAGNOSIS — Z34.02 ENCOUNTER FOR SUPERVISION OF NORMAL FIRST PREGNANCY IN SECOND TRIMESTER: Primary | ICD-10-CM

## 2022-07-18 NOTE — PROGRESS NOTES
Pt went for AFP to soon, needs to have redrawn  Pt aware and will  script this week    Advised can do between 16-18 weeks

## 2022-07-24 LAB
FERRITIN SERPL-MCNC: 7 NG/ML (ref 15–150)
IRON SATN MFR SERPL: 4 % (ref 15–55)
IRON SERPL-MCNC: 19 UG/DL (ref 27–159)
TIBC SERPL-MCNC: 517 UG/DL (ref 250–450)
UIBC SERPL-MCNC: 498 UG/DL (ref 131–425)

## 2022-07-29 DIAGNOSIS — O99.012 ANEMIA IN PREGNANCY, SECOND TRIMESTER: Primary | ICD-10-CM

## 2022-07-29 RX ORDER — SODIUM CHLORIDE 9 MG/ML
20 INJECTION, SOLUTION INTRAVENOUS ONCE
Status: CANCELLED | OUTPATIENT
Start: 2022-08-04

## 2022-08-01 LAB
2ND TRIMESTER 4 SCREEN SERPL-IMP: NORMAL
AFP ADJ MOM SERPL: 1.11
AFP INTERP AMN-IMP: NORMAL
AFP INTERP SERPL-IMP: NORMAL
AFP INTERP SERPL-IMP: NORMAL
AFP SERPL-MCNC: 58.6 NG/ML
AGE AT DELIVERY: 22.1 YR
GA METHOD: NORMAL
GA: 16.9 WEEKS
IDDM PATIENT QL: NO
MULTIPLE PREGNANCY: NO
NEURAL TUBE DEFECT RISK FETUS: NORMAL %

## 2022-08-04 ENCOUNTER — HOSPITAL ENCOUNTER (OUTPATIENT)
Dept: INFUSION CENTER | Facility: HOSPITAL | Age: 22
Discharge: HOME | End: 2022-08-04
Attending: OBSTETRICS & GYNECOLOGY
Payer: COMMERCIAL

## 2022-08-04 VITALS
SYSTOLIC BLOOD PRESSURE: 119 MMHG | DIASTOLIC BLOOD PRESSURE: 71 MMHG | HEART RATE: 93 BPM | TEMPERATURE: 98.4 F | OXYGEN SATURATION: 97 % | RESPIRATION RATE: 18 BRPM

## 2022-08-04 DIAGNOSIS — O99.012 ANEMIA IN PREGNANCY, SECOND TRIMESTER: Primary | ICD-10-CM

## 2022-08-04 PROCEDURE — 96365 THER/PROPH/DIAG IV INF INIT: CPT

## 2022-08-04 RX ORDER — SODIUM CHLORIDE 9 MG/ML
20 INJECTION, SOLUTION INTRAVENOUS ONCE
Status: COMPLETED | OUTPATIENT
Start: 2022-08-04 | End: 2022-08-04

## 2022-08-04 RX ORDER — SODIUM CHLORIDE 9 MG/ML
20 INJECTION, SOLUTION INTRAVENOUS ONCE
Status: CANCELLED | OUTPATIENT
Start: 2022-08-11

## 2022-08-04 RX ADMIN — IRON SUCROSE 100 MG: 20 INJECTION, SOLUTION INTRAVENOUS at 13:33

## 2022-08-04 RX ADMIN — SODIUM CHLORIDE 20 ML/HR: 0.9 INJECTION, SOLUTION INTRAVENOUS at 13:29

## 2022-08-05 ENCOUNTER — ROUTINE PRENATAL (OUTPATIENT)
Dept: OBGYN CLINIC | Facility: CLINIC | Age: 22
End: 2022-08-05

## 2022-08-05 VITALS — WEIGHT: 117.8 LBS | DIASTOLIC BLOOD PRESSURE: 62 MMHG | SYSTOLIC BLOOD PRESSURE: 114 MMHG | BODY MASS INDEX: 20.22 KG/M2

## 2022-08-05 DIAGNOSIS — Z3A.18 PREGNANCY WITH 18 COMPLETED WEEKS GESTATION: Primary | ICD-10-CM

## 2022-08-05 PROCEDURE — PNV: Performed by: OBSTETRICS & GYNECOLOGY

## 2022-08-05 NOTE — PROGRESS NOTES
Patient reports good fm flutters, no n/v, headache, cramping, bleeding, loss of fluid, edema, dom violence, or smoking  alix pnv  Has  Center appointment, urine trace protein negative glucose return in 4 weeks or sooner as needed  Given slip for CBC, iron panel, AFP

## 2022-08-10 LAB
BASOPHILS # BLD AUTO: 0 X10E3/UL (ref 0–0.2)
BASOPHILS NFR BLD AUTO: 0 %
EOSINOPHIL # BLD AUTO: 0.2 X10E3/UL (ref 0–0.4)
EOSINOPHIL NFR BLD AUTO: 3 %
ERYTHROCYTE [DISTWIDTH] IN BLOOD BY AUTOMATED COUNT: 19.6 % (ref 11.7–15.4)
HCT VFR BLD AUTO: 29.9 % (ref 34–46.6)
HGB BLD-MCNC: 9.2 G/DL (ref 11.1–15.9)
IMM GRANULOCYTES # BLD: 0.1 X10E3/UL (ref 0–0.1)
IMM GRANULOCYTES NFR BLD: 1 %
LYMPHOCYTES # BLD AUTO: 2.1 X10E3/UL (ref 0.7–3.1)
LYMPHOCYTES NFR BLD AUTO: 30 %
MCH RBC QN AUTO: 21.4 PG (ref 26.6–33)
MCHC RBC AUTO-ENTMCNC: 30.8 G/DL (ref 31.5–35.7)
MCV RBC AUTO: 70 FL (ref 79–97)
MONOCYTES # BLD AUTO: 0.7 X10E3/UL (ref 0.1–0.9)
MONOCYTES NFR BLD AUTO: 11 %
NEUTROPHILS # BLD AUTO: 3.8 X10E3/UL (ref 1.4–7)
NEUTROPHILS NFR BLD AUTO: 55 %
PLATELET # BLD AUTO: 356 X10E3/UL (ref 150–450)
RBC # BLD AUTO: 4.29 X10E6/UL (ref 3.77–5.28)
WBC # BLD AUTO: 6.9 X10E3/UL (ref 3.4–10.8)

## 2022-08-11 ENCOUNTER — HOSPITAL ENCOUNTER (OUTPATIENT)
Dept: INFUSION CENTER | Facility: HOSPITAL | Age: 22
Discharge: HOME | End: 2022-08-11
Attending: OBSTETRICS & GYNECOLOGY
Payer: COMMERCIAL

## 2022-08-11 VITALS
OXYGEN SATURATION: 100 % | DIASTOLIC BLOOD PRESSURE: 59 MMHG | SYSTOLIC BLOOD PRESSURE: 104 MMHG | TEMPERATURE: 98.1 F | RESPIRATION RATE: 18 BRPM | HEART RATE: 79 BPM

## 2022-08-11 DIAGNOSIS — O99.012 ANEMIA IN PREGNANCY, SECOND TRIMESTER: Primary | ICD-10-CM

## 2022-08-11 PROCEDURE — 96365 THER/PROPH/DIAG IV INF INIT: CPT

## 2022-08-11 RX ORDER — SODIUM CHLORIDE 9 MG/ML
20 INJECTION, SOLUTION INTRAVENOUS ONCE
Status: CANCELLED | OUTPATIENT
Start: 2022-08-18

## 2022-08-11 RX ORDER — SODIUM CHLORIDE 9 MG/ML
20 INJECTION, SOLUTION INTRAVENOUS ONCE
Status: COMPLETED | OUTPATIENT
Start: 2022-08-11 | End: 2022-08-11

## 2022-08-11 RX ADMIN — IRON SUCROSE 100 MG: 20 INJECTION, SOLUTION INTRAVENOUS at 14:15

## 2022-08-11 RX ADMIN — SODIUM CHLORIDE 20 ML/HR: 0.9 INJECTION, SOLUTION INTRAVENOUS at 14:15

## 2022-08-12 LAB
2ND TRIMESTER 4 SCREEN SERPL-IMP: NORMAL
AFP ADJ MOM SERPL: 1.05
AFP INTERP AMN-IMP: NORMAL
AFP INTERP SERPL-IMP: NORMAL
AFP INTERP SERPL-IMP: NORMAL
AFP SERPL-MCNC: 75.3 NG/ML
AGE AT DELIVERY: 22.1 YR
GA METHOD: NORMAL
GA: 19.4 WEEKS
IDDM PATIENT QL: NO
MULTIPLE PREGNANCY: NO
NEURAL TUBE DEFECT RISK FETUS: NORMAL %

## 2022-08-14 ENCOUNTER — HOSPITAL ENCOUNTER (OUTPATIENT)
Facility: HOSPITAL | Age: 22
Discharge: HOME | End: 2022-08-14
Attending: OBSTETRICS & GYNECOLOGY | Admitting: OBSTETRICS & GYNECOLOGY
Payer: COMMERCIAL

## 2022-08-14 VITALS
RESPIRATION RATE: 18 BRPM | OXYGEN SATURATION: 99 % | HEART RATE: 95 BPM | SYSTOLIC BLOOD PRESSURE: 118 MMHG | TEMPERATURE: 98.9 F | DIASTOLIC BLOOD PRESSURE: 59 MMHG

## 2022-08-14 LAB
BILIRUB UR QL STRIP: NEGATIVE
CLARITY UR: CLEAR
COLOR UR: YELLOW
GLUCOSE UR STRIP-MCNC: NEGATIVE MG/DL
HGB UR QL STRIP.AUTO: NEGATIVE
KETONES UR STRIP-MCNC: NEGATIVE MG/DL
LEUKOCYTE ESTERASE UR QL STRIP: NEGATIVE
NITRITE UR QL STRIP: NEGATIVE
PH UR STRIP.AUTO: 7 [PH] (ref 4.5–8)
PROT UR STRIP-MCNC: NEGATIVE MG/DL
SP GR UR STRIP.AUTO: >=1.03 (ref 1–1.03)
UROBILINOGEN UR QL STRIP.AUTO: 1 E.U./DL

## 2022-08-14 PROCEDURE — NC001 PR NO CHARGE: Performed by: OBSTETRICS & GYNECOLOGY

## 2022-08-14 PROCEDURE — 76817 TRANSVAGINAL US OBSTETRIC: CPT

## 2022-08-14 PROCEDURE — 76830 TRANSVAGINAL US NON-OB: CPT

## 2022-08-14 PROCEDURE — 99214 OFFICE O/P EST MOD 30 MIN: CPT

## 2022-08-14 PROCEDURE — 81003 URINALYSIS AUTO W/O SCOPE: CPT

## 2022-08-14 RX ORDER — ACETAMINOPHEN 325 MG/1
650 TABLET ORAL EVERY 6 HOURS PRN
Status: DISCONTINUED | OUTPATIENT
Start: 2022-08-14 | End: 2022-08-15 | Stop reason: HOSPADM

## 2022-08-14 RX ADMIN — ACETAMINOPHEN 650 MG: 325 TABLET ORAL at 23:38

## 2022-08-15 NOTE — PROCEDURES
Mathew Anderson, a  at 20w0d with an MAGY of 2023, by Last Menstrual Period, was seen at [address] for the following procedure(s): $Procedure Type: US - Transvaginal]                       Ultrasound Other  Cervical Length: 3 05         Ultrasound Probe Disinfection    A transvaginal ultrasound was performed     Prior to use, disinfection was performed with High Level Disinfection Process (Trophon)    Jami Randall MD  22  11:51 PM

## 2022-08-15 NOTE — PROGRESS NOTES
L&D Triage Note - OB/GYN  Jyotsna Obando 24 y o  female MRN: [id number]  Unit/Bed#: LD TRIAGE  Encounter: [id number]      ASSESSMENT:    Jyotsna Obando is a 24 y o   at 20w0d presenting with lower back pain that wraps around to the front  R/o PTL is negative  No signs of UTI, rupture of membranes, or infection  Patient discharged  PLAN:    1) R/o PTL   -CL 3 05 cm   -Tones 140s   -[place] quiet   -SVE: closed   -Spec: no pooling, cervix is closed  2) Continue routine prenatal care  3) Discharge from [facility] triage with  labor precautions    - Reviewed rupture of membranes, false vs true labor, decreased fetal movement, and vaginal bleeding   - Pt to call provider with any concerns and follow up at her next scheduled prenatal appointment    - Case discussed with Dr Klaus Kessler:    Jyotsna Obando 21 y o  Janus Folds at 20w0d with an Estimated Date of Delivery: 23 presenting with lower back pain that wraps around to the front that starts today  Rates the pain at a 10/10  Contractions: none  Leakage of fluid: none  Vaginal Bleeding: none  Fetal movement: present    OBJECTIVE:    Vitals:    22 2233   BP: 118/59   Pulse: 95   Resp: 18   Temp: 98 9 °F (37 2 °C)   SpO2: 99%       ROS:  Constitutional: Negative  Respiratory: Negative  Cardiovascular: Negative    Gastrointestinal: Negative    General Physical Exam:  General: in no apparent distress, appears comfortable  Cardiovascular: No murmurs  Lungs: non-labored breathing  Abdomen: abdomen is soft without significant tenderness, masses, organomegaly or guarding  Lower extremeties: nontender  MSK: mild tenderness to palpation of sacroiliac joints   No CVA tenderness    Cervical Exam  Speculum: Cervical os is closed  SVE: 0 / 0% / -3    Fetal monitoring:  Tones:  140s bpm  [place]: quiet     KOH/WTMT:     Infection:   - few clue cells    - no hyphae   - no trichomonads present    Membrane status   - no ferning   - negative nitrazene   - no pooling     Urine Dip    - negative    Imaging:       TVUS   - Cervical length    - 2 88cm    - 3 69cm    - 3 05cm      Mesfin Conn MD,  OBGYN PGY-1  8/14/2022 11:39 PM

## 2022-08-18 ENCOUNTER — HOSPITAL ENCOUNTER (OUTPATIENT)
Dept: INFUSION CENTER | Facility: HOSPITAL | Age: 22
Discharge: HOME | End: 2022-08-18
Attending: OBSTETRICS & GYNECOLOGY

## 2022-08-19 ENCOUNTER — HOSPITAL ENCOUNTER (OUTPATIENT)
Dept: INFUSION CENTER | Facility: HOSPITAL | Age: 22
Discharge: HOME | End: 2022-08-19
Attending: OBSTETRICS & GYNECOLOGY
Payer: COMMERCIAL

## 2022-08-19 VITALS
HEART RATE: 87 BPM | RESPIRATION RATE: 20 BRPM | OXYGEN SATURATION: 98 % | SYSTOLIC BLOOD PRESSURE: 107 MMHG | TEMPERATURE: 97.5 F | DIASTOLIC BLOOD PRESSURE: 65 MMHG

## 2022-08-19 DIAGNOSIS — O99.012 ANEMIA IN PREGNANCY, SECOND TRIMESTER: Primary | ICD-10-CM

## 2022-08-19 PROCEDURE — 96365 THER/PROPH/DIAG IV INF INIT: CPT

## 2022-08-19 RX ORDER — SODIUM CHLORIDE 9 MG/ML
20 INJECTION, SOLUTION INTRAVENOUS ONCE
Status: COMPLETED | OUTPATIENT
Start: 2022-08-19 | End: 2022-08-19

## 2022-08-19 RX ORDER — SODIUM CHLORIDE 9 MG/ML
20 INJECTION, SOLUTION INTRAVENOUS ONCE
Status: CANCELLED | OUTPATIENT
Start: 2022-08-25

## 2022-08-19 RX ADMIN — IRON SUCROSE 100 MG: 20 INJECTION, SOLUTION INTRAVENOUS at 10:36

## 2022-08-19 RX ADMIN — SODIUM CHLORIDE 20 ML/HR: 9 INJECTION, SOLUTION INTRAVENOUS at 10:36

## 2022-08-25 ENCOUNTER — HOSPITAL ENCOUNTER (OUTPATIENT)
Dept: INFUSION CENTER | Facility: HOSPITAL | Age: 22
Discharge: HOME | End: 2022-08-25
Attending: OBSTETRICS & GYNECOLOGY
Payer: COMMERCIAL

## 2022-08-25 VITALS
SYSTOLIC BLOOD PRESSURE: 109 MMHG | RESPIRATION RATE: 16 BRPM | DIASTOLIC BLOOD PRESSURE: 62 MMHG | HEART RATE: 107 BPM | TEMPERATURE: 98.3 F

## 2022-08-25 DIAGNOSIS — O99.012 ANEMIA IN PREGNANCY, SECOND TRIMESTER: Primary | ICD-10-CM

## 2022-08-25 PROCEDURE — 96365 THER/PROPH/DIAG IV INF INIT: CPT

## 2022-08-25 RX ORDER — SODIUM CHLORIDE 9 MG/ML
20 INJECTION, SOLUTION INTRAVENOUS ONCE
Status: CANCELLED | OUTPATIENT
Start: 2022-09-01

## 2022-08-25 RX ORDER — SODIUM CHLORIDE 9 MG/ML
20 INJECTION, SOLUTION INTRAVENOUS ONCE
Status: COMPLETED | OUTPATIENT
Start: 2022-08-25 | End: 2022-08-25

## 2022-08-25 RX ADMIN — IRON SUCROSE 100 MG: 20 INJECTION, SOLUTION INTRAVENOUS at 14:04

## 2022-08-25 RX ADMIN — SODIUM CHLORIDE 20 ML/HR: 0.9 INJECTION, SOLUTION INTRAVENOUS at 14:02

## 2022-08-31 ENCOUNTER — ROUTINE PRENATAL (OUTPATIENT)
Dept: OBGYN CLINIC | Facility: CLINIC | Age: 22
End: 2022-08-31

## 2022-08-31 VITALS — DIASTOLIC BLOOD PRESSURE: 62 MMHG | SYSTOLIC BLOOD PRESSURE: 120 MMHG | WEIGHT: 127 LBS | BODY MASS INDEX: 21.8 KG/M2

## 2022-08-31 DIAGNOSIS — Z34.02 ENCOUNTER FOR SUPERVISION OF NORMAL FIRST PREGNANCY IN SECOND TRIMESTER: Primary | ICD-10-CM

## 2022-08-31 DIAGNOSIS — O99.012 ANEMIA IN PREGNANCY, SECOND TRIMESTER: ICD-10-CM

## 2022-08-31 DIAGNOSIS — Z3A.13 13 WEEKS GESTATION OF PREGNANCY: ICD-10-CM

## 2022-08-31 PROCEDURE — PNV: Performed by: NURSE PRACTITIONER

## 2022-08-31 RX ORDER — PRENATAL VIT 91/IRON/FOLIC/DHA 28-975-200
1 COMBINATION PACKAGE (EA) ORAL DAILY
Qty: 60 EACH | Refills: 4 | Status: SHIPPED | OUTPATIENT
Start: 2022-08-31 | End: 2022-10-03 | Stop reason: SDUPTHER

## 2022-08-31 RX ORDER — ASPIRIN 81 MG/1
162 TABLET ORAL DAILY
Qty: 180 TABLET | Refills: 3 | Status: SHIPPED | OUTPATIENT
Start: 2022-08-31

## 2022-08-31 NOTE — PROGRESS NOTES
OFFICE VISIT: Denies any N/V, HA, Cramping, VB, LOF, Edema, Domestic Violence, Smoking  + FM  Tolerating PNV  Some cramping  Reviewed s/sx of PTL and when to call office  Has last venofer infusion scheduled  Recommend repeating CBC after last infusion  Has anatomy scan scheduled for tomorrow  Urine neg/neg

## 2022-09-01 ENCOUNTER — ROUTINE PRENATAL (OUTPATIENT)
Dept: PERINATAL CARE | Facility: OTHER | Age: 22
End: 2022-09-01
Payer: COMMERCIAL

## 2022-09-01 ENCOUNTER — HOSPITAL ENCOUNTER (OUTPATIENT)
Dept: INFUSION CENTER | Facility: HOSPITAL | Age: 22
Discharge: HOME | End: 2022-09-01
Attending: OBSTETRICS & GYNECOLOGY
Payer: COMMERCIAL

## 2022-09-01 VITALS
DIASTOLIC BLOOD PRESSURE: 54 MMHG | HEART RATE: 88 BPM | BODY MASS INDEX: 21.72 KG/M2 | SYSTOLIC BLOOD PRESSURE: 100 MMHG | WEIGHT: 127.21 LBS | HEIGHT: 64 IN

## 2022-09-01 VITALS
TEMPERATURE: 97.8 F | RESPIRATION RATE: 18 BRPM | OXYGEN SATURATION: 99 % | DIASTOLIC BLOOD PRESSURE: 59 MMHG | HEART RATE: 101 BPM | SYSTOLIC BLOOD PRESSURE: 116 MMHG

## 2022-09-01 DIAGNOSIS — O99.019 SICKLE CELL TRAIT IN MOTHER AFFECTING PREGNANCY (HCC): ICD-10-CM

## 2022-09-01 DIAGNOSIS — Z36.86 ENCOUNTER FOR ANTENATAL SCREENING FOR CERVICAL LENGTH: ICD-10-CM

## 2022-09-01 DIAGNOSIS — Z3A.22 22 WEEKS GESTATION OF PREGNANCY: ICD-10-CM

## 2022-09-01 DIAGNOSIS — Z36.3 ENCOUNTER FOR ANTENATAL SCREENING FOR MALFORMATION: Primary | ICD-10-CM

## 2022-09-01 DIAGNOSIS — D57.3 SICKLE CELL TRAIT IN MOTHER AFFECTING PREGNANCY (HCC): ICD-10-CM

## 2022-09-01 DIAGNOSIS — O99.012 ANEMIA IN PREGNANCY, SECOND TRIMESTER: Primary | ICD-10-CM

## 2022-09-01 PROCEDURE — 76805 OB US >/= 14 WKS SNGL FETUS: CPT | Performed by: OBSTETRICS & GYNECOLOGY

## 2022-09-01 PROCEDURE — 96365 THER/PROPH/DIAG IV INF INIT: CPT

## 2022-09-01 PROCEDURE — 76817 TRANSVAGINAL US OBSTETRIC: CPT | Performed by: OBSTETRICS & GYNECOLOGY

## 2022-09-01 RX ORDER — SODIUM CHLORIDE 9 MG/ML
20 INJECTION, SOLUTION INTRAVENOUS ONCE
Status: CANCELLED | OUTPATIENT
Start: 2022-09-01

## 2022-09-01 RX ORDER — SODIUM CHLORIDE 9 MG/ML
20 INJECTION, SOLUTION INTRAVENOUS ONCE
Status: COMPLETED | OUTPATIENT
Start: 2022-09-01 | End: 2022-09-01

## 2022-09-01 RX ADMIN — SODIUM CHLORIDE 20 ML/HR: 9 INJECTION, SOLUTION INTRAVENOUS at 13:55

## 2022-09-01 RX ADMIN — IRON SUCROSE 100 MG: 20 INJECTION, SOLUTION INTRAVENOUS at 13:55

## 2022-09-01 NOTE — PROGRESS NOTES
Ultrasound Probe Disinfection    A transvaginal ultrasound was performed  Prior to use, disinfection was performed with High Level Disinfection Process (Trophon)  Probe serial number A2: [id number] was used        [facility] Luisana Amor  09/01/22  9:03 AM

## 2022-09-01 NOTE — PROGRESS NOTES
Amaury Villagrna  has no complaints today at 22w4d  She does not report any vaginal bleeding or signs of labor  Her recently completed fetal testing revealed a normal NIPT and MSAFP  Misty Kanner left immediately after her appointment  I called her on the phone to review her ultrasound report and recommendations  Problem list:  1  Sickle cell trait  Her partner has not been screened yet and she would rather just wait to have the baby screened after birth  Her sister has sickle cell disease  She is on baby aspirin 162 milligrams daily and is aware to stop her baby aspirin at 36 weeks  Ultrasound findings: The ultrasound today shows normal interval fetal growth and fluid, normal cervical length, and no malformations were detected  Pregnancy ultrasound has limitations and is unable to detect all forms of fetal congenital abnormalities  Follow up recommended:   1  No further follow-up ultrasounds are recommended at this time         Jeane Flores MD

## 2022-09-01 NOTE — LETTER
September 1, 2022     MARTIN Ambrocio  [address] [facility]    Patient: Jose Carmichael   YOB: 2000   Date of Visit: 9/1/2022       Dear Dr Karl Yates: Thank you for referring Sena Marie to me for evaluation  Below are my notes for this consultation  If you have questions, please do not hesitate to call me  I look forward to following your patient along with you  Sincerely,        Jean Claude Harris MD        CC: No Recipients  Jean Claude Harris MD  9/1/2022 11:05 AM  Sign when Signing Visit  Jose Carmichael  has no complaints today at 22w4d  She does not report any vaginal bleeding or signs of labor  Her recently completed fetal testing revealed a normal NIPT and MSAFP  Bry Heller left immediately after her appointment  I called her on the phone to review her ultrasound report and recommendations  Problem list:  1  Sickle cell trait  Her partner has not been screened yet and she would rather just wait to have the baby screened after birth  Her sister has sickle cell disease  She is on baby aspirin 162 milligrams daily and is aware to stop her baby aspirin at 36 weeks  Ultrasound findings: The ultrasound today shows normal interval fetal growth and fluid, normal cervical length, and no malformations were detected  Pregnancy ultrasound has limitations and is unable to detect all forms of fetal congenital abnormalities  Follow up recommended:   1  No further follow-up ultrasounds are recommended at this time         Jean Claude Harris MD

## 2022-09-11 LAB
BASOPHILS # BLD AUTO: 0 X10E3/UL (ref 0–0.2)
BASOPHILS NFR BLD AUTO: 0 %
EOSINOPHIL # BLD AUTO: 0.4 X10E3/UL (ref 0–0.4)
EOSINOPHIL NFR BLD AUTO: 5 %
ERYTHROCYTE [DISTWIDTH] IN BLOOD BY AUTOMATED COUNT: 23.7 % (ref 11.7–15.4)
HCT VFR BLD AUTO: 33 % (ref 34–46.6)
HGB BLD-MCNC: 9.9 G/DL (ref 11.1–15.9)
IMM GRANULOCYTES # BLD: 0.1 X10E3/UL (ref 0–0.1)
IMM GRANULOCYTES NFR BLD: 1 %
LYMPHOCYTES # BLD AUTO: 1.7 X10E3/UL (ref 0.7–3.1)
LYMPHOCYTES NFR BLD AUTO: 22 %
MCH RBC QN AUTO: 23.1 PG (ref 26.6–33)
MCHC RBC AUTO-ENTMCNC: 30 G/DL (ref 31.5–35.7)
MCV RBC AUTO: 77 FL (ref 79–97)
MONOCYTES # BLD AUTO: 1.2 X10E3/UL (ref 0.1–0.9)
MONOCYTES NFR BLD AUTO: 15 %
MORPHOLOGY BLD-IMP: ABNORMAL
NEUTROPHILS # BLD AUTO: 4.2 X10E3/UL (ref 1.4–7)
NEUTROPHILS NFR BLD AUTO: 57 %
PLATELET # BLD AUTO: 327 X10E3/UL (ref 150–450)
RBC # BLD AUTO: 4.28 X10E6/UL (ref 3.77–5.28)
WBC # BLD AUTO: 7.5 X10E3/UL (ref 3.4–10.8)

## 2022-09-14 ENCOUNTER — PATIENT MESSAGE (OUTPATIENT)
Dept: OBGYN CLINIC | Facility: CLINIC | Age: 22
End: 2022-09-14

## 2022-09-26 ENCOUNTER — VBI (OUTPATIENT)
Dept: ADMINISTRATIVE | Facility: OTHER | Age: 22
End: 2022-09-26

## 2022-10-01 ENCOUNTER — NURSE TRIAGE (OUTPATIENT)
Dept: OTHER | Facility: OTHER | Age: 22
End: 2022-10-01

## 2022-10-01 ENCOUNTER — HOSPITAL ENCOUNTER (EMERGENCY)
Facility: HOSPITAL | Age: 22
Discharge: HOME | End: 2022-10-01
Attending: STUDENT IN AN ORGANIZED HEALTH CARE EDUCATION/TRAINING PROGRAM | Admitting: STUDENT IN AN ORGANIZED HEALTH CARE EDUCATION/TRAINING PROGRAM
Payer: COMMERCIAL

## 2022-10-01 VITALS
TEMPERATURE: 98.6 F | SYSTOLIC BLOOD PRESSURE: 118 MMHG | HEIGHT: 64 IN | BODY MASS INDEX: 21.72 KG/M2 | HEART RATE: 98 BPM | RESPIRATION RATE: 16 BRPM | WEIGHT: 127.21 LBS | OXYGEN SATURATION: 99 % | DIASTOLIC BLOOD PRESSURE: 68 MMHG

## 2022-10-01 DIAGNOSIS — R10.9 ABDOMINAL PAIN: ICD-10-CM

## 2022-10-01 DIAGNOSIS — O46.92 VAGINAL BLEEDING IN PREGNANCY, SECOND TRIMESTER: Primary | ICD-10-CM

## 2022-10-01 LAB
BACTERIA UR QL AUTO: NORMAL /HPF
BILIRUB UR QL STRIP: NEGATIVE
CLARITY UR: CLEAR
COLOR UR: YELLOW
GLUCOSE UR STRIP-MCNC: NEGATIVE MG/DL
HGB UR QL STRIP.AUTO: ABNORMAL
KETONES UR STRIP-MCNC: NEGATIVE MG/DL
LEUKOCYTE ESTERASE UR QL STRIP: NEGATIVE
NITRITE UR QL STRIP: NEGATIVE
NON-SQ EPI CELLS URNS QL MICRO: NORMAL /HPF
PH UR STRIP.AUTO: 7 [PH] (ref 4.5–8)
PROT UR STRIP-MCNC: NEGATIVE MG/DL
RBC #/AREA URNS AUTO: NORMAL /HPF
SP GR UR STRIP.AUTO: 1.01 (ref 1–1.03)
UROBILINOGEN UR QL STRIP.AUTO: 0.2 E.U./DL
WBC #/AREA URNS AUTO: NORMAL /HPF

## 2022-10-01 PROCEDURE — 76815 OB US LIMITED FETUS(S): CPT

## 2022-10-01 PROCEDURE — 81001 URINALYSIS AUTO W/SCOPE: CPT

## 2022-10-01 PROCEDURE — 99214 OFFICE O/P EST MOD 30 MIN: CPT

## 2022-10-01 PROCEDURE — NC001 PR NO CHARGE: Performed by: OBSTETRICS & GYNECOLOGY

## 2022-10-01 RX ORDER — ACETAMINOPHEN 325 MG/1
650 TABLET ORAL EVERY 6 HOURS PRN
Refills: 0
Start: 2022-10-01

## 2022-10-01 NOTE — PROCEDURES
Amaury Villagran, a  at 26w6d with an MAGY of 2023, by Last Menstrual Period, was seen at [address] for the following procedure(s): $Procedure Type: US - abdominal, BRAYAN]         4 Quadrant BRAYAN  LVP (cm): 4 1 cm         Ultrasound Other  Fetal Presentation: Vertex  Placenta Location: Fundal                         Martha Nielsen,   OB/GYN, PGY-1

## 2022-10-01 NOTE — TELEPHONE ENCOUNTER
Regarding: vaginal bleeding/pregnant  ----- Message from [facility] sent at 10/1/2022  6:35 AM EDT -----  "I am 26 weeks pregnant and I am experiencing vaginal bleeding "

## 2022-10-01 NOTE — PROGRESS NOTES
L&D Triage Note - OB/GYN  Kaitlin Shetty 24 y o  female MRN: [id number]  Unit/Bed#: LD TRIAGE  Encounter: [id number]      ASSESSMENT:    Kaitlin Shetty is a 24 y o  Laverle Yuli at 26w6d presenting for vaginal bleeding    PLAN:    1) Vaginal Bleeding  - No active bleeding in triage  Dark brown blood removed with ProctSwab on Speculum exam  - L sided abdominal pain has improved with no interventions since onset  - Placenta noted to be fundal on TAUS  - SVE 0/0/-3  - Vital signs stable while in triage  - Rh positive  - Pt to f/u as outpatient on Monday in [place] [address] office    Discharge Instructions:   Continue routine prenatal care  Discharge from [facility] triage with  labor precautions    - Reviewed rupture of membranes, false vs true labor, decreased fetal movement, and vaginal bleeding   - Pt to call provider with any concerns and follow up at her next scheduled prenatal appointment on 10/3/22 at 0930    - Case discussed with Dr Juan Goetz:    Kaitlin Shetty 21 y o  Kelin Fuentes at 26w6d with an Estimated Date of Delivery: 23     She presents to triage with reported vaginal bleeding  Reports vaginal bleeding began suddenly at 0615 this morning into her underwear and in the toilet  She was not able to quantify how much bleeding she was having, but reported 'a lot "  She has no prior episodes of vaginal bleeding in this pregnancy  Additionally she is complaining of left sided lower cramping abdominal pain that she rated a 5/10 earlier this morning  Notes that her pain does not radiate  She denies any other abdominal pain at this time  She notes that her pain in triage is now a 3/10 and has improved with no medications  She denies any intercourse or vaginal foreign object insertion  Denies any fever, chills, chest pain, shortness of breath, diarrhea, constipation, dysuria, hematuria, vaginal pain, vaginal discharge or any other complaints at this time       Her current obstetrical history is unremarkable  Contractions: denies  Leakage of fluid: denies  Fetal movement: present    OBJECTIVE:    Vitals:    10/01/22 0755   BP: 118/68   Pulse: 98   Resp: 16   Temp: 98 6 °F (37 °C)   SpO2:        ROS:  Constitutional: Negative  Respiratory: Negative  Cardiovascular: Negative    Gastrointestinal: see HPI    General Physical Exam:  General: in no apparent distress, alert and normal vitals  Cardiovascular: Cor RRR and No murmurs  Lungs: non-labored breathing, no wheezing  Abdomen: gravid abdomen, minimal tenderness to palpation of LLQ, no other abdominal tenderness appreciate, no rebound or guarding present  Lower extremeties: nontender    Cervical Exam  Speculum: Cervical os is visually closed  No visible pooling  Small amount of dark brown blood removed with Proctaswab  No active bleeding on exam  No vaginal discharge noted  SVE: 0 / 0% / -3    Fetal monitoring:  FHT:  140 bpm/ Moderate 6 - 25 bpm / 10 x 10 accelerations present, no decelerations  Terminous: contractions not present     KOH/WTMT:     Infection:   - no clue cells    - no hyphae   - no trichomonads present    Membrane status   - no ferning   - no pooling     Urine Dip    - positive for blood  Urine dipstick shows negative for all components          Abd  US   LVP:  4 1 cm   Placenta: Fundal   Presentation: [place], OBGYN PGY-1  10/1/2022 9:40 AM

## 2022-10-03 ENCOUNTER — ROUTINE PRENATAL (OUTPATIENT)
Dept: OBGYN CLINIC | Facility: CLINIC | Age: 22
End: 2022-10-03
Payer: COMMERCIAL

## 2022-10-03 VITALS — BODY MASS INDEX: 23.69 KG/M2 | WEIGHT: 138 LBS | DIASTOLIC BLOOD PRESSURE: 68 MMHG | SYSTOLIC BLOOD PRESSURE: 120 MMHG

## 2022-10-03 DIAGNOSIS — Z34.02 ENCOUNTER FOR SUPERVISION OF NORMAL FIRST PREGNANCY IN SECOND TRIMESTER: ICD-10-CM

## 2022-10-03 DIAGNOSIS — Z23 NEED FOR INFLUENZA VACCINATION: Primary | ICD-10-CM

## 2022-10-03 PROCEDURE — 90686 IIV4 VACC NO PRSV 0.5 ML IM: CPT | Performed by: NURSE PRACTITIONER

## 2022-10-03 PROCEDURE — PNV: Performed by: NURSE PRACTITIONER

## 2022-10-03 PROCEDURE — 90471 IMMUNIZATION ADMIN: CPT | Performed by: NURSE PRACTITIONER

## 2022-10-03 RX ORDER — PRENATAL VIT 91/IRON/FOLIC/DHA 28-975-200
1 COMBINATION PACKAGE (EA) ORAL DAILY
Qty: 60 EACH | Refills: 4 | Status: SHIPPED | OUTPATIENT
Start: 2022-10-03

## 2022-10-03 NOTE — PROGRESS NOTES
OFFICE VISIT: Denies any N/V, HA, Cramping, VB, LOF, Edema, Domestic Violence, Smoking  + FM  Tolerating PNV  Was recently seen in L&D Triage 2 days ago for an episode of vaginal bleeding  After examination she was discharged home with precautions  She continues to get venofer infusions due to continued anemia in pregnancy  Had anatomy scan with MFM, no further u/s needed unless clinically indicated  Urine neg/neg  Will get Flu vaccine today  rx for 28 wk labs given  RTO 3 weeks or sooner as needed

## 2022-10-05 ENCOUNTER — TELEPHONE (OUTPATIENT)
Dept: OBGYN CLINIC | Facility: CLINIC | Age: 22
End: 2022-10-05

## 2022-10-05 ENCOUNTER — TELEPHONE (OUTPATIENT)
Dept: FAMILY MEDICINE CLINIC | Facility: CLINIC | Age: 22
End: 2022-10-05

## 2022-10-05 NOTE — TELEPHONE ENCOUNTER
Pharmacy called and states they don't have the prenatal + DHA combo  They will fill separately and is covered by insurance

## 2022-10-06 ENCOUNTER — TELEPHONE (OUTPATIENT)
Dept: FAMILY MEDICINE CLINIC | Facility: CLINIC | Age: 22
End: 2022-10-06

## 2022-10-06 ENCOUNTER — OFFICE VISIT (OUTPATIENT)
Dept: FAMILY MEDICINE CLINIC | Facility: CLINIC | Age: 22
End: 2022-10-06
Payer: COMMERCIAL

## 2022-10-06 VITALS
BODY MASS INDEX: 23.66 KG/M2 | OXYGEN SATURATION: 100 % | DIASTOLIC BLOOD PRESSURE: 58 MMHG | HEART RATE: 96 BPM | WEIGHT: 138.6 LBS | RESPIRATION RATE: 18 BRPM | HEIGHT: 64 IN | TEMPERATURE: 100.1 F | SYSTOLIC BLOOD PRESSURE: 116 MMHG

## 2022-10-06 DIAGNOSIS — J45.990 EXERCISE-INDUCED ASTHMA: Primary | ICD-10-CM

## 2022-10-06 PROCEDURE — 99213 OFFICE O/P EST LOW 20 MIN: CPT | Performed by: FAMILY MEDICINE

## 2022-10-06 RX ORDER — ALBUTEROL SULFATE 90 UG/1
2 AEROSOL, METERED RESPIRATORY (INHALATION) EVERY 6 HOURS PRN
Qty: 18 G | Refills: 3 | Status: SHIPPED | OUTPATIENT
Start: 2022-10-06

## 2022-10-06 NOTE — TELEPHONE ENCOUNTER
Patient left message on refill line requesting Albuterol inhaler to [facility]    Albuterol not on active medication list

## 2022-10-06 NOTE — PROGRESS NOTES
Assessment/Plan:     Diagnoses and all orders for this visit:    Exercise-induced asthma  -     albuterol (Proventil HFA) 90 mcg/act inhaler; Inhale 2 puffs every 6 (six) hours as needed for wheezing  Pulmonary exam unremarkable, saturating well on room air  Refilled albuterol for PRN use  Dyspnea on exertion likely secondary to severe anemia- getting venofer infusions  Subjective:      Patient ID: Bhupinder Byrd is a 24 y o  female  HPI   Pt is 27 weeks pregnant, complicated by severe anemia  Pt reports hx of asthma  Pt reports not taking inhaler for a year, but pt states recently she feels like she needs one since she gets dyspnea on exertion, not at rest  Denies chest pain, diaphoresis  The following portions of the patient's history were reviewed and updated as appropriate: allergies, current medications, past family history, past medical history, past social history, past surgical history, and problem list     Review of Systems   Constitutional: Negative for chills and fever  Respiratory: Positive for shortness of breath  Negative for chest tightness  Cardiovascular: Negative for chest pain and palpitations  Gastrointestinal: Negative for abdominal pain  Genitourinary: Negative for dysuria  Skin: Negative for rash  Neurological: Negative for light-headedness and headaches  Psychiatric/Behavioral: Negative for dysphoric mood and suicidal ideas  The patient is not nervous/anxious  Objective:      /58   Pulse 96   Temp 100 1 °F (37 8 °C) (Tympanic)   Resp 18   Ht 5' 4" (1 626 m)   Wt 62 9 kg (138 lb 9 6 oz)   LMP 03/27/2022   SpO2 100%   BMI 23 79 kg/m²          Physical Exam  Constitutional:       Appearance: Normal appearance  HENT:      Head: Normocephalic and atraumatic  Cardiovascular:      Rate and Rhythm: Normal rate and regular rhythm  Pulses: Normal pulses  Heart sounds: Normal heart sounds  No murmur heard    Pulmonary:      Effort: Pulmonary effort is normal  No respiratory distress  Breath sounds: Normal breath sounds  No wheezing  Musculoskeletal:      Right lower leg: No edema  Left lower leg: No edema  Skin:     General: Skin is warm and dry  Neurological:      General: No focal deficit present  Mental Status: She is alert and oriented to person, place, and time     Psychiatric:         Mood and Affect: Mood normal          Behavior: Behavior normal

## 2022-10-11 LAB
BASOPHILS # BLD AUTO: 0 X10E3/UL (ref 0–0.2)
BASOPHILS NFR BLD AUTO: 0 %
EOSINOPHIL # BLD AUTO: 0.3 X10E3/UL (ref 0–0.4)
EOSINOPHIL NFR BLD AUTO: 5 %
ERYTHROCYTE [DISTWIDTH] IN BLOOD BY AUTOMATED COUNT: 23 % (ref 11.7–15.4)
GLUCOSE 1H P 50 G GLC PO SERPL-MCNC: 177 MG/DL (ref 70–139)
HCT VFR BLD AUTO: 34 % (ref 34–46.6)
HGB BLD-MCNC: 10.4 G/DL (ref 11.1–15.9)
IMM GRANULOCYTES # BLD: 0 X10E3/UL (ref 0–0.1)
IMM GRANULOCYTES NFR BLD: 0 %
LYMPHOCYTES # BLD AUTO: 1.3 X10E3/UL (ref 0.7–3.1)
LYMPHOCYTES NFR BLD AUTO: 20 %
MCH RBC QN AUTO: 23.1 PG (ref 26.6–33)
MCHC RBC AUTO-ENTMCNC: 30.6 G/DL (ref 31.5–35.7)
MCV RBC AUTO: 76 FL (ref 79–97)
MONOCYTES # BLD AUTO: 0.5 X10E3/UL (ref 0.1–0.9)
MONOCYTES NFR BLD AUTO: 7 %
MORPHOLOGY BLD-IMP: ABNORMAL
NEUTROPHILS # BLD AUTO: 4.5 X10E3/UL (ref 1.4–7)
NEUTROPHILS NFR BLD AUTO: 68 %
PLATELET # BLD AUTO: 359 X10E3/UL (ref 150–450)
RBC # BLD AUTO: 4.5 X10E6/UL (ref 3.77–5.28)
RPR SER QL: NON REACTIVE
WBC # BLD AUTO: 6.7 X10E3/UL (ref 3.4–10.8)

## 2022-10-18 DIAGNOSIS — R73.09 ABNORMAL GLUCOSE LEVEL: Primary | ICD-10-CM

## 2022-10-18 NOTE — PROGRESS NOTES
Pt failed 1hr GTT, needs to go for 3hr GTT, orders placed and will send pt a message via [employer]

## 2022-10-20 LAB
GLUCOSE 1H P 100 G GLC PO SERPL-MCNC: 187 MG/DL (ref 70–179)
GLUCOSE 2H P 100 G GLC PO SERPL-MCNC: 165 MG/DL (ref 70–154)
GLUCOSE 3H P 100 G GLC PO SERPL-MCNC: 108 MG/DL (ref 70–139)
GLUCOSE P FAST SERPL-MCNC: 88 MG/DL (ref 70–94)
SL AMB NOTE:: ABNORMAL

## 2022-10-21 ENCOUNTER — ROUTINE PRENATAL (OUTPATIENT)
Dept: OBGYN CLINIC | Facility: CLINIC | Age: 22
End: 2022-10-21
Payer: COMMERCIAL

## 2022-10-21 VITALS — SYSTOLIC BLOOD PRESSURE: 114 MMHG | BODY MASS INDEX: 24.2 KG/M2 | WEIGHT: 141 LBS | DIASTOLIC BLOOD PRESSURE: 72 MMHG

## 2022-10-21 DIAGNOSIS — Z3A.29 29 WEEKS GESTATION OF PREGNANCY: ICD-10-CM

## 2022-10-21 DIAGNOSIS — Z23 NEED FOR DIPHTHERIA-TETANUS-PERTUSSIS (TDAP) VACCINE: ICD-10-CM

## 2022-10-21 DIAGNOSIS — Z34.93 PRENATAL CARE IN THIRD TRIMESTER: Primary | ICD-10-CM

## 2022-10-21 PROBLEM — O24.410 DIET CONTROLLED GESTATIONAL DIABETES MELLITUS (GDM) IN THIRD TRIMESTER: Status: ACTIVE | Noted: 2022-10-21

## 2022-10-21 LAB
DME PARACHUTE DELIVERY DATE REQUESTED: NORMAL
DME PARACHUTE ITEM DESCRIPTION: NORMAL
DME PARACHUTE ORDER STATUS: NORMAL
DME PARACHUTE SUPPLIER NAME: NORMAL
DME PARACHUTE SUPPLIER PHONE: NORMAL

## 2022-10-21 PROCEDURE — 90471 IMMUNIZATION ADMIN: CPT | Performed by: STUDENT IN AN ORGANIZED HEALTH CARE EDUCATION/TRAINING PROGRAM

## 2022-10-21 PROCEDURE — 90715 TDAP VACCINE 7 YRS/> IM: CPT | Performed by: STUDENT IN AN ORGANIZED HEALTH CARE EDUCATION/TRAINING PROGRAM

## 2022-10-21 PROCEDURE — 99215 OFFICE O/P EST HI 40 MIN: CPT | Performed by: STUDENT IN AN ORGANIZED HEALTH CARE EDUCATION/TRAINING PROGRAM

## 2022-10-21 NOTE — PROGRESS NOTES
Glenis Nicholson is a 23 yo  at 29w5d presenting for routine PNC- denies any CTX, LOF or VB  Endorses good FM  Was unable to give urine at today's visit  We reviewed GDM diagnosis and that diabetic educators will be reaching out to order testing materials and review dietary changes  30 week packet given and reviewed- delivery consent reviewed and scanned into chart  Tdap today and breast pump ordered  RTO in 2 weeks or sooner if needed

## 2022-10-21 NOTE — PATIENT INSTRUCTIONS
Pregnancy at 32 to 30 Weeks   AMBULATORY CARE:   What changes are happening to your body: You may notice new symptoms such as shortness of breath, heartburn, or swelling of your ankles and feet  You may also have trouble sleeping or contractions  Seek care immediately if:   You develop a severe headache that does not go away  You have new or increased vision changes, such as blurred or spotted vision  You have new or increased swelling in your face or hands  You have vaginal spotting or bleeding  Your water broke or you feel warm water gushing or trickling from your vagina  Call your doctor or obstetrician if:   You have more than 5 contractions in 1 hour  You notice any changes in your baby's movements  You have abdominal cramps, pressure, or tightening  You have a change in vaginal discharge  You have chills or a fever  You have vaginal itching, burning, or pain  You have yellow, green, white, or foul-smelling vaginal discharge  You have pain or burning when you urinate, less urine than usual, or pink or bloody urine  You have questions or concerns about your condition or care  How to care for yourself at this stage of your pregnancy:       Eat a variety of healthy foods  Healthy foods include fruits, vegetables, whole-grain breads, low-fat dairy foods, beans, lean meats, and fish  Drink liquids as directed  Ask how much liquid to drink each day and which liquids are best for you  Limit caffeine to less than 200 milligrams each day  Limit your intake of fish to 2 servings each week  Choose fish low in mercury such as canned light tuna, shrimp, salmon, cod, or tilapia  Do not  eat fish high in mercury such as swordfish, tilefish, mariah mackerel, and shark  Manage heartburn  by eating 4 or 5 small meals each day instead of large meals  Avoid spicy food  Manage swelling  by lying down and putting your feet up  Take prenatal vitamins as directed  Your need for certain vitamins and minerals, such as folic acid, increases during pregnancy  Prenatal vitamins provide some of the extra vitamins and minerals you need  Prenatal vitamins may also help to decrease the risk of certain birth defects  Talk to your healthcare provider about exercise  Moderate exercise can help you stay fit  Your healthcare provider will help you plan an exercise program that is safe for you during pregnancy  Do not smoke  Smoking increases your risk of a miscarriage and other health problems during your pregnancy  Smoking can cause your baby to be born too early or weigh less at birth  Ask your healthcare provider for information if you need help quitting  Do not drink alcohol  Alcohol passes from your body to your baby through the placenta  It can affect your baby's brain development and cause fetal alcohol syndrome (FAS)  FAS is a group of conditions that causes mental, behavior, and growth problems  Talk to your healthcare provider before you take any medicines  Many medicines may harm your baby if you take them when you are pregnant  Do not take any medicines, vitamins, herbs, or supplements without first talking to your healthcare provider  Never use illegal or street drugs (such as marijuana or cocaine) while you are pregnant  Safety tips during pregnancy:   Avoid hot tubs and saunas  Do not use a hot tub or sauna while you are pregnant, especially during your first trimester  Hot tubs and saunas may raise your baby's temperature and increase the risk of birth defects  Avoid toxoplasmosis  This is an infection caused by eating raw meat or being around infected cat feces  It can cause birth defects, miscarriages, and other problems  Wash your hands after you touch raw meat  Make sure any meat is well-cooked before you eat it  Avoid raw eggs and unpasteurized milk   Use gloves or ask someone else to clean your cat's litter box while you are pregnant  Changes that are happening with your baby:  By 30 weeks, your baby may weigh more than 3 pounds  Your baby may be about 11 inches long from the top of the head to the rump (baby's bottom)  Your baby's eyes open and close now  Your baby's kicks and movements are more forceful at this time  What you need to know about prenatal care: Your healthcare provider will check your blood pressure and weight  You may also need the following:  Blood tests  may be done to check for anemia or blood type  A urine test  may also be done to check for sugar and protein  These can be signs of gestational diabetes or infection  Protein in your urine may also be a sign of preeclampsia  Preeclampsia is a condition that can develop during week 20 or later of your pregnancy  It causes high blood pressure, and it can cause problems with your kidneys and other organs  A Tdap vaccine and flu vaccine  may be recommended by your healthcare provider  A gestational diabetes screen  may be done  Your healthcare provider may order either a 1-step or 2-step oral glucose tolerance test (OGTT)  1-step OGTT:  Your blood sugar level will be tested after you have not eaten for 8 hours (fasting)  You will then be given a glucose drink  Your level will be tested again 1 hour and 2 hours after you finish the drink  2-step OGTT:  You do not have to fast for the first part of the test  You will have the glucose drink at any time of day  Your blood sugar level will be checked 1 hour later  If your blood sugar is higher than a certain level, another test will be ordered  You will fast and your blood sugar level will be tested  You will have the glucose drink  Your blood will be tested again 1 hour, 2 hours, and 3 hours after you finish the glucose drink  Fundal height  is a measurement of your uterus to check your baby's growth  This number is usually the same as the number of weeks that you have been pregnant   Your healthcare provider may also check your baby's position  Your baby's heart rate  will be checked  Follow up with your doctor or obstetrician as directed:  Write down your questions so you remember to ask them during your visits  © Copyright [employer] 2022 Information is for End User's use only and may not be sold, redistributed or otherwise used for commercial purposes  All illustrations and images included in CareNotes® are the copyrighted property of [employer]  or [employer]   The above information is an  only  It is not intended as medical advice for individual conditions or treatments  Talk to your doctor, nurse or pharmacist before following any medical regimen to see if it is safe and effective for you  Kick Counts in Pregnancy   WHAT YOU NEED TO KNOW:   Kick counts measure how much your baby is moving in your womb  A kick from your baby can be felt as a twist, turn, swish, roll, or jab  It is common to feel your baby kicking at 26 to 28 weeks of pregnancy  You may feel your baby kick as early as 20 weeks of pregnancy  You may want to start counting at 28 weeks  DISCHARGE INSTRUCTIONS:   Contact your doctor immediately if:   You feel a change in the number of kicks or movements of your baby  You feel fewer than 10 kicks within 2 hours  You have questions or concerns about your baby's movements  Why measure kick counts:  Your baby's movement may provide information about your baby's health  He or she may move less, or not at all, if there are problems  Your baby may move less if he or she is not getting enough oxygen or nutrition from the placenta  Do not smoke while you are pregnant  Smoking decreases the amount of oxygen that gets to your baby  Talk to your healthcare provider if you need help to quit smoking  Tell your healthcare provider as soon as you feel a change in your baby's movements    When to measure kick counts:   Measure kick counts at the same time every day  Measure kick counts when your baby is awake and most active  Your baby may be most active in the evening  How to measure kick counts:  Check that your baby is awake before you measure kick counts  You can wake up your baby by lightly pushing on your belly, walking, or drinking something cold  Your healthcare provider may tell you different ways to measure kick counts  You may be told to do the following:  Use a chart or clock to keep track of the time you start and finish counting  Sit in a chair or lie on your left side  Place your hands on the largest part of your belly  Count until you reach 10 kicks  Write down how much time it takes to count 10 kicks  It may take 30 minutes to 2 hours to count 10 kicks  It should not take more than 2 hours to count 10 kicks  Follow up with your doctor as directed:  Write down your questions so you remember to ask them during your visits  © Copyright [employer] 2022 Information is for End User's use only and may not be sold, redistributed or otherwise used for commercial purposes  All illustrations and images included in CareNotes® are the copyrighted property of [employer]  or [facility] Virgil Ortiz   The above information is an  only  It is not intended as medical advice for individual conditions or treatments  Talk to your doctor, nurse or pharmacist before following any medical regimen to see if it is safe and effective for you

## 2022-11-03 ENCOUNTER — ROUTINE PRENATAL (OUTPATIENT)
Dept: OBGYN CLINIC | Facility: CLINIC | Age: 22
End: 2022-11-03

## 2022-11-03 VITALS — BODY MASS INDEX: 24.72 KG/M2 | WEIGHT: 144 LBS | DIASTOLIC BLOOD PRESSURE: 60 MMHG | SYSTOLIC BLOOD PRESSURE: 110 MMHG

## 2022-11-03 DIAGNOSIS — O24.410 DIET CONTROLLED GESTATIONAL DIABETES MELLITUS (GDM) IN THIRD TRIMESTER: Primary | ICD-10-CM

## 2022-11-03 NOTE — PROGRESS NOTES
Marjan Hassan is a 25 yo  at 31w4d presenting for routine PNC- denies any CTX, LOF or VB  Endorses good FM  Urine not provided this visit  Has been noticing increased N/V- vomiting is new, nausea is not  Does take zofran with relief and is able to tolerate PO  Reviewed small meals throughout the day- gatorade 0 to keep hydrated and zofran Q6 hours PRN  Office to reach out to diabetic educators today to help touch base for GDM  RTO in 2 weeks or sooner if needed

## 2022-11-03 NOTE — PATIENT INSTRUCTIONS
Pregnancy at 31 to 34 Weeks   AMBULATORY CARE:   Changes happening with your body: You may continue to have symptoms such as shortness of breath, heartburn, contractions, or swelling of your ankles and feet  You may be gaining about 1 pound a week now  Seek care immediately if:   You develop a severe headache that does not go away  You have new or increased vision changes, such as blurred or spotted vision  You have new or increased swelling in your face or hands  You have vaginal spotting or bleeding  Your water broke or you feel warm water gushing or trickling from your vagina  Call your obstetrician if:   You have more than 5 contractions in 1 hour  You notice any changes in your baby's movements  You have abdominal cramps, pressure, or tightening  You have a change in vaginal discharge  You have chills or a fever  You have vaginal itching, burning, or pain  You have yellow, green, white, or foul-smelling vaginal discharge  You have pain or burning when you urinate, less urine than usual, or pink or bloody urine  You have questions or concerns about your condition or care  How to care for yourself at this stage of your pregnancy:       Eat a variety of healthy foods  Healthy foods include fruits, vegetables, whole-grain breads, low-fat dairy foods, beans, lean meats, and fish  Drink liquids as directed  Ask how much liquid to drink each day and which liquids are best for you  Limit caffeine to less than 200 milligrams each day  Limit your intake of fish to 2 servings each week  Choose fish low in mercury such as canned light tuna, shrimp, salmon, cod, or tilapia  Do not  eat fish high in mercury such as swordfish, tilefish, mariah mackerel, and shark  Manage heartburn  by eating 4 or 5 small meals each day instead of large meals  Avoid spicy food  Manage swelling  by lying down and putting your feet up  Take prenatal vitamins as directed    Your need for certain vitamins and minerals, such as folic acid, increases during pregnancy  Prenatal vitamins provide some of the extra vitamins and minerals you need  Prenatal vitamins may also help to decrease the risk of certain birth defects  Talk to your healthcare provider about exercise  Moderate exercise can help you stay fit  Your healthcare provider will help you plan an exercise program that is safe for you during pregnancy  Do not smoke  Smoking increases your risk of a miscarriage and other health problems during your pregnancy  Smoking can cause your baby to be born too early or weigh less at birth  Ask your healthcare provider for information if you need help quitting  Do not drink alcohol  Alcohol passes from your body to your baby through the placenta  It can affect your baby's brain development and cause fetal alcohol syndrome (FAS)  FAS is a group of conditions that causes mental, behavior, and growth problems  Talk to your healthcare provider before you take any medicines  Many medicines may harm your baby if you take them when you are pregnant  Do not take any medicines, vitamins, herbs, or supplements without first talking to your healthcare provider  Never use illegal or street drugs (such as marijuana or cocaine) while you are pregnant  Safety tips during pregnancy:   Avoid hot tubs and saunas  Do not use a hot tub or sauna while you are pregnant, especially during your first trimester  Hot tubs and saunas may raise your baby's temperature and increase the risk of birth defects  Avoid toxoplasmosis  This is an infection caused by eating raw meat or being around infected cat feces  It can cause birth defects, miscarriages, and other problems  Wash your hands after you touch raw meat  Make sure any meat is well-cooked before you eat it  Avoid raw eggs and unpasteurized milk  Use gloves or ask someone else to clean your cat's litter box while you are pregnant  Changes happening with your baby:  By 34 weeks, your baby may weigh more than 5 pounds  Your baby will be about 12 ½ inches long from the top of the head to the rump (baby's bottom)  Your baby is gaining about ½ pound a week  Your baby's eyes open and close now  Your baby's kicks and movements are more forceful at this time  What you need to know about prenatal care: Your healthcare provider will check your blood pressure and weight  You may also need the following:  A urine test  may also be done to check for sugar and protein  These can be signs of gestational diabetes or infection  Protein in your urine may also be a sign of preeclampsia  Preeclampsia is a condition that can develop during week 20 or later of your pregnancy  It causes high blood pressure, and it can cause problems with your kidneys and other organs  A gestational diabetes screen  may be done  Your healthcare provider may order either a 1-step or 2-step oral glucose tolerance test (OGTT)  1-step OGTT:  Your blood sugar level will be tested after you have not eaten for 8 hours (fasting)  You will then be given a glucose drink  Your level will be tested again 1 hour and 2 hours after you finish the drink  2-step OGTT:  You do not have to fast for the first part of the test  You will have the glucose drink at any time of day  Your blood sugar level will be checked 1 hour later  If your blood sugar is higher than a certain level, another test will be ordered  You will fast and your blood sugar level will be tested  You will have the glucose drink  Your blood will be tested again 1 hour, 2 hours, and 3 hours after you finish the glucose drink  A Tdap vaccine  may be recommended by your healthcare provider  Fundal height  is a measurement of your uterus to check your baby's growth  This number is usually the same as the number of weeks that you have been pregnant   Your healthcare provider may also check your baby's position  Your baby's heart rate  will be checked  Follow up with your obstetrician as directed:  Write down your questions so you remember to ask them during your visits  © Copyright [employer] 2022 Information is for End User's use only and may not be sold, redistributed or otherwise used for commercial purposes  All illustrations and images included in CareNotes® are the copyrighted property of A D A M , Inc  or Pham Ortiz   The above information is an  only  It is not intended as medical advice for individual conditions or treatments  Talk to your doctor, nurse or pharmacist before following any medical regimen to see if it is safe and effective for you  Kick Counts in Pregnancy   WHAT YOU NEED TO KNOW:   Kick counts measure how much your baby is moving in your womb  A kick from your baby can be felt as a twist, turn, swish, roll, or jab  It is common to feel your baby kicking at 26 to 28 weeks of pregnancy  You may feel your baby kick as early as 20 weeks of pregnancy  You may want to start counting at 28 weeks  DISCHARGE INSTRUCTIONS:   Contact your doctor immediately if:   You feel a change in the number of kicks or movements of your baby  You feel fewer than 10 kicks within 2 hours  You have questions or concerns about your baby's movements  Why measure kick counts:  Your baby's movement may provide information about your baby's health  He or she may move less, or not at all, if there are problems  Your baby may move less if he or she is not getting enough oxygen or nutrition from the placenta  Do not smoke while you are pregnant  Smoking decreases the amount of oxygen that gets to your baby  Talk to your healthcare provider if you need help to quit smoking  Tell your healthcare provider as soon as you feel a change in your baby's movements  When to measure kick counts:   Measure kick counts at the same time every day        Measure kick counts when your baby is awake and most active  Your baby may be most active in the evening  How to measure kick counts:  Check that your baby is awake before you measure kick counts  You can wake up your baby by lightly pushing on your belly, walking, or drinking something cold  Your healthcare provider may tell you different ways to measure kick counts  You may be told to do the following:  Use a chart or clock to keep track of the time you start and finish counting  Sit in a chair or lie on your left side  Place your hands on the largest part of your belly  Count until you reach 10 kicks  Write down how much time it takes to count 10 kicks  It may take 30 minutes to 2 hours to count 10 kicks  It should not take more than 2 hours to count 10 kicks  Follow up with your doctor as directed:  Write down your questions so you remember to ask them during your visits  © [employer] 2022 Information is for End User's use only and may not be sold, redistributed or otherwise used for commercial purposes  All illustrations and images included in CareNotes® are the copyrighted property of A D A M , Inc  or [address] [place], [place] [address]   The above information is an  only  It is not intended as medical advice for individual conditions or treatments  Talk to your doctor, nurse or pharmacist before following any medical regimen to see if it is safe and effective for you  Gestational Diabetes   AMBULATORY CARE:   Gestational diabetes (GDM)  is a type of diabetes that develops during pregnancy, usually in the second or third trimester  GDM causes your blood sugar level to rise too high  This can harm you and your unborn baby  Blood sugar levels usually go back to normal after the baby is born  The cause of GDM is not known  Hormones made by the placenta may cause insulin resistance  Insulin helps move sugar out of the blood so it can be used for energy   Insulin resistance means your pancreas makes insulin, but your body cannot use it  As the placenta grows, more of these hormones are produced  The hormones block insulin and cause your blood sugar level to rise  Signs and symptoms of GDM include the following:   More hunger or thirst than usual    Having to urinate often    Blurred vision    Feeling more tired than usual    Bladder, vagina, or skin infections that happen often    More weight gain than your healthcare provider suggests during your pregnancy    Nausea or vomiting    Call your local emergency number (911 in the [address]) if:   Your heartbeat is fast and weak, or your breathing is fast and shallow  You are more sleepy than usual or become confused  You have blurred or double vision  Seek care immediately if:   Your breath has a fruity, sweet smell  You are shaking or sweating  Your blood sugar level is below 70 mg/dL or above 250 mg/dL and does not improve with treatment  You have a headache, or you are dizzy  Call your doctor or diabetes care team if:   You think your baby is not moving as much as usual     You have more hunger or thirst than usual     You are urinating more often than usual     You have an upset stomach and are vomiting  You have questions or concerns about your condition or care  The following may increase your risk for GDM:   Lack of physical activity, such as exercise    A family history of diabetes    A history of high blood sugar, blood pressure, or cholesterol levels    Being overweight or obese    Past delivery of a large baby, pregnant with more than 1 baby    Glycosuria (sugar in your urine) or polycystic ovary syndrome (PCOS)    Being [place] American, , , [place] American, or m[place] heritage    Manage GDM:  GDM may be controlled with meal planning and physical activity  The goal is to keep your blood sugar level as close to normal, as safely as possible   Your healthcare provider and dietitian will help set up a meal and activity plan for you  Follow your meal plan as directed  Talk to a dietitian or healthcare provider about the best meal plan for you  He or she may recommend that you eat 3 small meals and 2 to 4 snacks every day  Control the amount of carbohydrates (such as bread, cereal, and fruit) you eat at each meal and snack  Too much carbohydrate in 1 meal or snack can cause your blood sugar to rise to a high level  Your dietitian or provider will tell you how much carbohydrate to eat at each meal and snack  Eat foods that are a good source of fiber, such as vegetables and legumes (beans and lentils)  Ask your provider about the best physical activity plan for you  Physical activity helps keep your blood sugar level steady  A good goal is to be active for at least 30 minutes, 5 days a week  Low-impact activities such as walking or swimming are effective  Insulin  may be needed if your diabetes is not controlled by nutrition and exercise  Insulin is safe to use during pregnancy  Insulin may be given by injections, or you may have an insulin pump or pen  An insulin pump is an implanted device that gives you a constant amount of insulin through the day  The amount changes when the number of carbohydrates are entered  An insulin pen is a device prefilled with the right amount of insulin  You and your care team will discuss which method is best for you  Check your blood sugar level as directed: Your healthcare provider will teach you how to check your blood sugar level  You will learn what your blood sugar level should be  You will be given information on when to check your blood sugar level  You will learn what to do if the level is too high or too low  You may need to check a drop of blood in a glucose test machine  Your care team provider may recommend a continuous glucose monitor (CGM)  A CGM is a device that is worn at all times  The CGM checks your blood sugar every 5 minutes   It sends results to an electronic device such as a smart phone  Ask your provider what your blood sugar levels should be before and after you eat  He or she may suggest that your blood sugar level should be at or below 95 mg/dL before you eat  The level may need to be at or below 140 mg/dL 1 hour after you eat or at or below 120 mg/dL 2 hours after you eat  Your provider may give you higher target levels if you are at risk for hypoglycemia  Write down your results, and show them to your provider  He or she may use the results to make changes to your medicine, food, and physical activity schedules  What else you need to know about GDM:   Your A1c level may be checked  A hemoglobin A1c is a blood test that measures your average blood sugar level for the past 2 to 3 months  It is also called an HbA1c or glycohemoglobin test  The level is given as a percentage  An A1c of 6% or lower is usually recommended during pregnancy  If you are at risk for hypoglycemia, your goal may be 7%  Changes to your nutrition, physical activity, or medicine plan may be made to help you reach your goal  Your provider may recommend that you have your A1c checked 1 time each month  Check your blood pressure often  High blood pressure can cause problems with your health and your pregnancy  Blood pressure readings are usually written as 2 numbers  Your systolic blood pressure (the first number) should be between 110 and 129  Your diastolic blood pressure (the second number) should be between 65 and 79  Maintain a healthy weight  A healthy weight can help you control your GDM  Ask your healthcare provider how much weight is healthy for you to gain during your pregnancy  If you were overweight before you became pregnant, he or she may recommend a safe weight loss plan during pregnancy  Your provider or a dietitian can help you create a healthy meal plan for you and your baby   Do not  try to go on a crash diet or try to lose weight without your provider's approval  You may not get enough calories or nutrients for you and your baby  Do not smoke  Nicotine is dangerous for you and your baby and can make it harder to manage GDM  Do not use e-cigarettes or smokeless tobacco in place of cigarettes or to help you quit  They still contain nicotine  Ask your provider for information if you currently smoke and need help quitting  You may need diabetes screening after you give birth  Screening may be done 4 to 12 weeks after your baby is born  This is to check if you have developed diabetes, problems with your fasting glucose levels, or glucose intolerance  You may need other tests or treatment if you have any of these  Testing may be repeated every 1 to 3 years if you had GDM but normal tests within 12 weeks of giving birth  Follow up with your doctor or diabetes care team as directed: You will need to have screening tests for diabetes 4 to 12 weeks after you have your baby  You may also need to have tests for diabetes every 1 to 3 years for life  Write down your questions so you remember to ask them during your visits  © Copyright [employer] 2022 Information is for End User's use only and may not be sold, redistributed or otherwise used for commercial purposes  All illustrations and images included in CareNotes® are the copyrighted property of A D A M , Inc  or [facility] Virgil Ortiz   The above information is an  only  It is not intended as medical advice for individual conditions or treatments  Talk to your doctor, nurse or pharmacist before following any medical regimen to see if it is safe and effective for you

## 2022-11-04 ENCOUNTER — OFFICE VISIT (OUTPATIENT)
Dept: PERINATAL CARE | Facility: CLINIC | Age: 22
End: 2022-11-04

## 2022-11-04 VITALS
HEART RATE: 102 BPM | BODY MASS INDEX: 24.48 KG/M2 | HEIGHT: 64 IN | SYSTOLIC BLOOD PRESSURE: 118 MMHG | WEIGHT: 143.4 LBS | DIASTOLIC BLOOD PRESSURE: 52 MMHG

## 2022-11-04 DIAGNOSIS — O12.03 SWELLING OF LOWER EXTREMITY DURING PREGNANCY IN THIRD TRIMESTER: ICD-10-CM

## 2022-11-04 DIAGNOSIS — Z3A.31 31 WEEKS GESTATION OF PREGNANCY: ICD-10-CM

## 2022-11-04 DIAGNOSIS — O24.419 GESTATIONAL DIABETES MELLITUS (GDM) IN THIRD TRIMESTER, GESTATIONAL DIABETES METHOD OF CONTROL UNSPECIFIED: Primary | ICD-10-CM

## 2022-11-04 DIAGNOSIS — O99.012 ANEMIA IN PREGNANCY, SECOND TRIMESTER: ICD-10-CM

## 2022-11-04 RX ORDER — BLOOD-GLUCOSE METER
EACH MISCELLANEOUS
Qty: 1 KIT | Refills: 0 | Status: SHIPPED | OUTPATIENT
Start: 2022-11-04

## 2022-11-04 RX ORDER — LANCETS 33 GAUGE
EACH MISCELLANEOUS
Qty: 100 EACH | Refills: 5 | Status: SHIPPED | OUTPATIENT
Start: 2022-11-04 | End: 2023-01-01

## 2022-11-04 RX ORDER — BLOOD SUGAR DIAGNOSTIC
STRIP MISCELLANEOUS
Qty: 100 STRIP | Refills: 5 | Status: SHIPPED | OUTPATIENT
Start: 2022-11-04 | End: 2023-01-01

## 2022-11-04 NOTE — PATIENT INSTRUCTIONS
CLASS 1  Diabetes in Pregnancy Program   [facility] Maternal Fetal Medicine    Diabetes Team:   Cora Patel (Felicia Thibodeaux) Alfonzo Chiu (Chaz Phillip) [place], RD CDE Giacomo Gerald Abran Hodgkin) Jeannine Bride, RD    Our medical assistant, Zachary Veloz can be reached at [phone] Monday thru Friday 7:45 am - 4:15 pm      We look forward to helping you manage your gestational diabetes during pregnancy  CHECKING BLOOD SUGARS    CHECKING BLOOD SUGARS:  Always carry your meter and testing supplies with you at all times  Bring you glucose meter to all your appointments in our [facility] office  **A request for a glucose meter, test strips and lancets was sent to the provider for approval  Once your prescriptions are approved by the provider, your prescription will be sent electronically to your pharmacy  Be mindful the provider is seeing patients in the office today so allow ample time for yourprescriptions to be approved  Call your pharmacy to verify your medication was received electronically and is ready for pick-up before going to pharmacy  If you have any issues with coverage or your meter is unavailable, please reach out to our office at [phone]  How to Check Blood Sugars:  Wash your hands with soap and water or use waterless  to clean your hands  Alcohol can dry your fingers and is not recommended  Alcohol can also cause false, elevated glucose readings  AVOID scented soaps and hand sanitizers - scented soaps may include sugar which can cause inaccurate/elevated readings   Gather your glucose meter kit and supplies  Prepare your meter by inserting a new test strip  This will automatically turn on your meter  Make sure test strips are not   Use a new test strip each time  Prepare your lancing device by inserting the lancet               After the lancet is securely in place, twist off the top to reveal needle     Reattach lancing device top   Once lancing device top is reattached, you are now ready to prick the side of your fingertip to get a blood sample  You can adjust the depth setting as needed  We recommend to start at "4"  Apply the blood sample to test strip according to instructions  Make sure your sharp container is: heavy duty plastic, puncture-resistant lid, upright and stable, leak resistant, properly labeled  Record your blood sugar     Additional References and Video: [email or web address] Finalta/    Frequency: 4 Times Daily     Timing:   Fasting   Test when you wake up before you have anything to eat or drink  At least 8hrs but no more than 10hrs from your bedtime snack  Exceeding 10hrs may result in inaccurate readings  2 hrs after the first bite of your meal (breakfast, lunch, dinner) **do not test for snacks    Goals:    Fasting  60-90   1 Hour   After Meals <140   2 Hours   After Meals <120   *please inform member of diabetes team if you begin testing 1hr blood sugars      REPORTING BLOOD SUGARS:   Please only pick ONE way to report to our team  Choose the best option for you  [facility] Blood Glucose Flow sheet under "Track my Health"   Remember to click "save" for your readings to automatically upload into Epic  MyChart Message with Attachment(s)  Send a picture of a written blood sugar log   To: MARTIN Thompson (Medical Question - Non Urgent)  You may include up to 3 images per message  Leave Voicemail at [phone]   Include: Name, Date of Birth, and Date + Blood Sugars    The diabetes team will review your blood sugar log weekly till delivery       MEAL PLAN AND DIET INSTRUCTIONS    *Carbohydrates: Sources of food that increase your blood sugar (include: starches, fruits, milk, desserts, starchy vegetables such as corn, peas, squash)    Meal Plan (3 Meals and 3 Snacks)  Outlines grams of carbohydrates to have at each meal and snack  Minimum Carbohydrate Intake Daily (avoid ketosis): 175g *to be distributed throughout day as instructed in meal plan  Include Protein at Every Meal and Snack  Eat all 3 meals and 3 snacks  Eating every 2 to 3 5 hours during the day  Preferably at the same times every day  Avoid going long periods of time without eating  Be sure to have bedtime snack that includes at least 30 grams of carbohydrates and 2 ounces (14 grams) of protein  Refer to [employer] in [employer] (pages 15-17)   Each food listed is equal to 15g (1 Carbohydrate Serving)  Read Food Label   Check Total Carbohydrate  15g = 1 Carbohydrate Serving  Check Serving Size! The total carbohydrate amount listed is based on 1 serving size  Be careful as many items contain more than one serving per package  Check Total Sugars  Choose items with <15g per serving of total sugar    Exercise:  If ok by your OB try adding a 20-30 minute walk after a meal to help keep glucose within range  Try incorporating at least 10 minutes of walking after each meal    Additional Information:   Continue to follow up with your OB and MFM providers as recommended  Always have glucose available for hypoglycemia, use 15 by 15 rule  (Page 29 in booklet)  While sick or feeling ill, follow our sick day guidelines in our GDM booklet  (Page 28 in booklet)  For travel and dining out tips refer to your GDM booklet  See attachment (Page 31 in booklet)    Class 2 Information: Approximately 1 week following Class 1  Bring 3 Day Food Log (everything you eat and drink for each meal and snack) - Will review diet more in depth and provide feedback     Remember: Importance of maintaining tight control of blood sugars during pregnancy = decrease risk factors including fetal macrosomia; birth injury; risk of ; polyhydramnios; pre-term labor; pre-eclampsia;  hypoglycemia; jaundice and stillbirth  Any questions give us a call at [phone] or send us a [employer] message to [facility],  Dara Vasques RD   Diabetes Educator   The Diabetes and Pregnancy [facility]  At [address] [place] [address]

## 2022-11-04 NOTE — PROGRESS NOTES
CLASS 1 - Individual  (in-person office visit )    Thank you for referring your patient to [facility] Maternal Fetal Medicine Diabetes in Pregnancy Program      Subjective:     Lydia Mcclellan is a 24 y o  female who presents today with Father of Baby for Patient Education (Class 1; Individual) and Gestational Diabetes (31w5d)  Patient is at 31w5d gestation, Estimated Date of Delivery: 23  • Learns best by: Kinesthetic (Interactive Small Groups, Hands-on, Tactile Representation)   • Patient rates own health as: Good  • Primary Support Person: Family Member (Mother)  • Kyle with stress by: Take a nap  • How do you feel the diabetes diagnosis will affect the rest of your pregnancy? Answer: "I have no idea"     Reviewed and updated the following from patients medical record: Demographics, Education, Occupation, PMH, Problem List, Allergies, and Current Medications  D&P Assessment responses have been reported throughout the following note  Visit Diagnosis:  Encounter Diagnosis     ICD-10-CM    1  Gestational diabetes mellitus (GDM) in third trimester, gestational diabetes method of control unspecified  O24 419 Ambulatory Referral to Maternal Fetal Medicine     Mychart glucose flowsheet   2  31 weeks gestation of pregnancy  Z3A 31 Mychart glucose flowsheet   3  Anemia in pregnancy, second trimester  O99 012     Stable at this time per pt   4  Swelling of lower extremity during pregnancy in third trimester  O12 03     Pt reported swelling in hands and feet        Discussed with patient:  • Pathophysiology of Gestational diabetes mellitus (GDM) in third trimester, gestational diabetes method of control unspecified [O24 419]  • Untreated hyperglycemia in pregnancy and maternal fetal complications (fetal macrosomia,  hypoglycemia, polyhydramnios, increased incidence of  section,  labor, and in severe cases fetal demise and still birth)     • Importance of blood glucose monitoring, nutrition, and medication if necessary in achieving BG goals  HPI    Diabetes Hx:   • Personal History? No  • Family History: Mother     Pregnancy Overview:   OB History    Para Term  AB Living   2 0 0 0 0     SAB IAB Ectopic Multiple Live Births   0 0 0          # Outcome Date GA Lbr Lenny/2nd Weight Sex Delivery Anes PTL Lv   2 Current            1                 Pregnancy Plan:   Pregnancy: Downey     Delivery Plans  Planned delivery method: Vaginal  Planned delivery location: [facility] L&D     Post-Delivery Plans  Feeding intentions: Breast Milk     Labs  GDM LABS:  • 1 Hour GTT:   Lab Results   Component Value Date/Time    [id number] 177 (H) 10/10/2022 10:06 AM      • 3 Hour GTT:        Labs Ordered This Visit: None    Current Outpatient Medications  Current Outpatient Medications   Medication Instructions   • acetaminophen (TYLENOL) 650 mg, Oral, Every 6 hours PRN   • albuterol (Proventil HFA) 90 mcg/act inhaler 2 puffs, Inhalation, Every 6 hours PRN   • aspirin (ECOTRIN LOW STRENGTH) 162 mg, Oral, Daily   • Docosahexaenoic Acid (PreNatal DHA) 200 MG CAPS 1 capsule, Daily   • Prenatal MV-Min-Fe Fum-FA-DHA (Prenatal+DHA) 28-0 975 & 200 MG MISC 1 tablet, Oral, Daily      Preferred Pharmacy:   [address] [place] [address] [address] [place] [address]  Phone: [phone] Fax: [phone]     Anthropometrics:  Ht Readings from Last 1 Encounters:   22 5' 4" (1 626 m)      Wt Readings from Last 3 Encounters:   22 65 kg (143 lb 6 4 oz)   22 65 3 kg (144 lb)   10/21/22 64 kg (141 lb)        Pre-Gravid Wt Pre-Gravid BMI TWG   50 3 kg (111 lb) 19 04 14 7 kg (32 lb 6 4 oz)     Total Pregnancy Weight Gain Recommendations: BMI (18 5-24 9) 25-35 lbs  • Current Wt Status Compared to Recommendations:  Within Range -- Continue recommended rate of weight gain based on pre-pregnancy BMI till delivery    Most Recent Ultrasound Results:  • Findings: NML Growth/BRAYAN per OB   o Further Fetal Surveillance: Beginning at 32 weeks (NST + BRAYAN twice a week) = Not indicated at this time  • Next US date: Scheduled Appropriately     Blood Glucose Monitoring:     Glucose Meter: OneTouch Verio Flex   *orders for supplies (meter, lancets, strips) based on patients needs pended/routed to appropriate provider after today's visit for review/approval     Instructed on Testing Blood Sugars: 4 x per day (Fasting, 2 hour after start of each meal)  • Goals: (Fasting) 60-90mg/dl // (1hr PP) <140mg/dl // (2hr PP) <120mg/dl  • Meter Teaching: Gave instruction on site selection, skin preparation, loading strips and lancet device, meter activation, obtaining blood sample, test strip and lancet disposal and storage, and recording log book entries  o Blood Sugar Tested in Office? No  • Instructed to report blood sugar results weekly via Phone: [phone] OR My Chart (Message with image attachment, or Glucose Flowsheet)    Meal Plan: Patient was provided with a meal plan including 3 meals and 3 snacks  *Calories: 2000 calorie (CHO: 45-15-60-15-60-30) (PRO: 2/3-1-3/4-1-3/4-2)    Review of Patient's Current Diet:  • Type of Diet: Regular   o Special or ethnic dietary preferences? no  o Food Allergies: None  o Food Intolerances: None  o Food Dislikes: None  • Receiving [facility] or food stamps? Yes, [facility]    Typical Timing/Frequency of Meals and Snacks:   • # Meals Daily: 3  • # Snacks Daily? 4     Bedtime: 8pm  Wake-Up: 2-3am   Breakfast: 7am    Meal Plan Tips Reviewed at Today's Visit:  • Appropriate amounts of CHO, PRO, and Fat at each meal and snack  • CHO exchange list, and portion sizes for both CHO and PRO via food models  • How to read a food label  • Suggested meal/snack options to increase nutrition and maintain consistent meal and snack intakes  • Eat every 2 0-3 5 hours while awake  • Go no longer than 8-10 hours fasting overnight until first meal of the day       Physical Activity:  • Currently physically active? Yes, Walking every 2 days    Reviewed w/ Pt:   • Benefits of physical activity to optimize blood glucose control, encouraged activity at patient is physically able    o Instructed pt to always consult a physician prior to starting an exercise program    • Recommend 20-30 minutes daily  Patient Stated Goal: "I will involve my family in my diabetes care"    Expected Compliance: good  • Barriers to Learning/Change: No Barriers  • Diabetes Self Management Support Plan (outside of ongoing care): Spouse/Family     Date to Report Blood Sugars: Day Before Class 2, Then Weekly (till delivery)    Class 2: Return in 10 days (on 11/14/2022) for Class 2 w/ Milagro Davis RD    *Patient instructed to bring 3 day food diary and/or write down foods associated with elevated after meal blood sugars    Begin Time: 11:30am    End Time: 12:34pm    It was a pleasure working with them today  Please feel free to call with any questions or concerns      Milagro Davis RD   Diabetes Educator  [facility]  Diabetes in Pregnancy Program  [address] [place] [address],[address]  [address]

## 2022-11-10 LAB
DME PARACHUTE DELIVERY DATE ACTUAL: NORMAL
DME PARACHUTE DELIVERY DATE REQUESTED: NORMAL
DME PARACHUTE ITEM DESCRIPTION: NORMAL
DME PARACHUTE ORDER STATUS: NORMAL
DME PARACHUTE SUPPLIER NAME: NORMAL
DME PARACHUTE SUPPLIER PHONE: NORMAL

## 2022-11-11 ENCOUNTER — ULTRASOUND (OUTPATIENT)
Dept: PERINATAL CARE | Facility: OTHER | Age: 22
End: 2022-11-11

## 2022-11-11 VITALS
SYSTOLIC BLOOD PRESSURE: 98 MMHG | DIASTOLIC BLOOD PRESSURE: 54 MMHG | HEART RATE: 103 BPM | WEIGHT: 144.18 LBS | HEIGHT: 64 IN | BODY MASS INDEX: 24.62 KG/M2

## 2022-11-11 DIAGNOSIS — Z3A.32 32 WEEKS GESTATION OF PREGNANCY: ICD-10-CM

## 2022-11-11 DIAGNOSIS — O24.410 DIET CONTROLLED GESTATIONAL DIABETES MELLITUS (GDM) IN THIRD TRIMESTER: ICD-10-CM

## 2022-11-11 DIAGNOSIS — O99.019 SICKLE CELL TRAIT IN MOTHER AFFECTING PREGNANCY (HCC): Primary | ICD-10-CM

## 2022-11-11 DIAGNOSIS — D57.3 SICKLE CELL TRAIT IN MOTHER AFFECTING PREGNANCY (HCC): Primary | ICD-10-CM

## 2022-11-11 NOTE — LETTER
November 18, 2022     Gerard Kerr, [address] [address]    Patient: Axel Sun   YOB: 2000   Date of Visit: 11/11/2022       Dear Dr Chapo Granados:    Thank you for referring Paxton Umana to me for evaluation  Below are my notes for this consultation  If you have questions, please do not hesitate to call me  I look forward to following your patient along with you  Sincerely,        Yudi Crockett MD        CC: No Recipients  Yudi Crockett MD  11/18/2022  9:02 AM  Sign when Signing Visit  A fetal ultrasound was completed  See Ob procedures in Epic for an interpretation and recommendations  Do not hesitate to contact us in [facility] if you have questions  David Hare MD, [facility]  Maternal Fetal Medicine

## 2022-11-14 ENCOUNTER — OFFICE VISIT (OUTPATIENT)
Dept: PERINATAL CARE | Facility: CLINIC | Age: 22
End: 2022-11-14

## 2022-11-14 VITALS
HEIGHT: 64 IN | BODY MASS INDEX: 24.72 KG/M2 | SYSTOLIC BLOOD PRESSURE: 112 MMHG | DIASTOLIC BLOOD PRESSURE: 54 MMHG | WEIGHT: 144.8 LBS | HEART RATE: 96 BPM

## 2022-11-14 DIAGNOSIS — O24.419 GESTATIONAL DIABETES MELLITUS (GDM) IN THIRD TRIMESTER, GESTATIONAL DIABETES METHOD OF CONTROL UNSPECIFIED: Primary | ICD-10-CM

## 2022-11-14 DIAGNOSIS — Z3A.33 33 WEEKS GESTATION OF PREGNANCY: ICD-10-CM

## 2022-11-14 NOTE — PROGRESS NOTES
CLASS 2 - Individual  (in-person office visit )    Thank you for referring your patient to [facility] Maternal Fetal Medicine Diabetes in Pregnancy Program      Maurisio Anderson is a  25 y o  female who presents today {ALONE OR FAMILY - EC:90712} for No chief complaint on file     Patient is at 33w1d gestation, Estimated Date of Delivery: 1/1/23  Visit Diagnosis:  Encounter Diagnosis     ICD-10-CM    1  Gestational diabetes mellitus (GDM) in third trimester, gestational diabetes method of control unspecified  O24 419    2  33 weeks gestation of pregnancy  Z3A 33         Reviewed and updated the following from patients medical record: PMH, Problem List, Allergies, and Current Medications      Labs  GDM LABS:  • 1 Hour GTT:   Lab Results   Component Value Date/Time    [id number] 177 (H) 10/10/2022 10:06 AM      • 3 Hour GTT:         Labs Ordered This Visit: {LABS:91914::"None"}    Current Medications:  Current Outpatient Medications   Medication Instructions   • acetaminophen (TYLENOL) 650 mg, Oral, Every 6 hours PRN   • albuterol (Proventil HFA) 90 mcg/act inhaler 2 puffs, Inhalation, Every 6 hours PRN   • aspirin (ECOTRIN LOW STRENGTH) 162 mg, Oral, Daily   • Blood Glucose Monitoring Suppl (OneTouch Verio Flex System) w/Device KIT Test x4 Daily or as instructed   • Docosahexaenoic Acid (PreNatal DHA) 200 MG CAPS 1 capsule, Daily   • OneTouch Delica Lancets 79I MISC Use 4 a Day or as instructed   • OneTouch Verio test strip Test 4 Times Daily or as instructed   • Prenatal MV-Min-Fe Fum-FA-DHA (Prenatal+DHA) 28-0 975 & 200 MG MISC 1 tablet, Oral, Daily        Anthropometrics:  Ht Readings from Last 1 Encounters:   11/11/22 5' 4" (1 626 m)      Wt Readings from Last 3 Encounters:   11/11/22 65 4 kg (144 lb 2 9 oz)   11/04/22 65 kg (143 lb 6 4 oz)   11/03/22 65 3 kg (144 lb)        Pre-Gravid Wt Pre-Gravid BMI TWG   50 3 kg (111 lb) 19 04 15 1 kg (33 lb 2 9 oz)     Total Pregnancy Weight Gain Recommendations: BMI (18 5-24 9) 25-35 lbs  • Current Wt Status Compared to Recommendations: Within Range -- Continue recommended rate of weight gain based on pre-pregnancy BMI till delivery    Most Recent Ultrasound Results:  • Findings: NML Growth/BRAYAN per OB   o Further Fetal Surveillance: Beginning at 32 weeks (NST + BRAYAN twice a week) = Not indicated at this time  • Next US date: To be scheduled as clinically indicated per OB    BLOOD GLUCOSE MONITORING:   Glucometer: OneTouch Verio Flex     Reinforced at [employer] Visit:   • Timing/Frequency of SMB x per day (Fasting, 2 hour after start of each meal)  • Goals: (Fasting) 60-90mg/dl // (1hr PP) <140mg/dl // (2hr PP) <120mg/dl  • Reporting Guidelines: Weekly via Phone: [phone] OR My Chart (Message with image attachment) OR Glucose Flowsheet  o Method of Reporting: [employer] Glucose Flowsheet    BG LOG:         Review of Blood Glucose Log:   • Indicates {DESC; POOR/FAIR/GOOD/EXCELLENT:28628} glycemic control  • FBG = {well controlled/ not well controlled:09718}  • Post-Prandial BG = {well controlled/ not well controlled:02075}    MEAL PLAN (Patient was provided with a meal plan including 3 meals and 3 snacks at class 1)  *Calories: {[facility] Meal Plans:19343}    Review of Patient's Current Diet (24hr Recall): refer to class 1 note for additional details     Bedtime: 8pm  Wake-Up: 2-3am   Breakfast: 7am    Breakfast: ***  AM Snack: ***  Lunch: ***  PM Snack: ***  Dinner: ***  Bedtime Snack(***): ***    Beverages: {Sugar-Sweetened Beverages?:97590}  Dining Out Frequency: {Frequency:58128}    Meal Plan Recommendations Compliant?  Comments:    Consistent CHO Intake {YES/NO:15371}     3 Meals and 3 Snacks {YES/NO:00011}     Protein w/ Every Meal and Snack {YES/NO:88342}     Eating every 2-3 5hrs while awake  {YES/NO:30858}     8-10hrs Fasting (from time of bedtime snack until first meal of the day) {YES/NO:51149}  {Comment:33780}     Overall Impression: Pt has a {DESC; GOOD/FAIR/POOR:75180} compliance and understanding of diet recommendations at this time  Reinforced Diet Instructions:  1  Individualized meal plan  2  Importance of consistent carbohydrate intake via 3 meals and 3 snacks per day   3  Importance of protein as it relates to blood glucose control  4  Encouraged  patient to eat every 2 0-3 5 hours while awake  5  Encouraged patient to go no longer than 8-10 hours fasting overnight until first meal of the day  6  Provided suggested meal/snack options to increase nutrition and maintain consistent meal and snack intakes  Physical Activity:  • Currently physically active? Yes, Walking every 2 days    Reviewed w/ Pt:   • Benefits of physical activity to optimize blood glucose control, encouraged activity at patient is physically able    o Instructed pt to always consult a physician prior to starting an exercise program    • Recommend 20-30 minutes daily  Additional Topics Reviewed:    • Medications: (reviewed options available with pt)  o Discussed if blood sugars are not within normal range with meal planning and exercise  o Reviewed medication such as metformin and/or basal/bolus insulin may be needed for better glucose control  • Maternal-Fetal Testing:   o Ultrasounds: growth scans every 4 weeks  o NST: twice weekly starting at 32nd week GA  o BRAYAN:  weekly starting at 32 weeks GA  • Sick day Guidelines:   o Advised that sickness will raise blood sugar   o If blood sugar is > 160 mg/dL twice in one day call doctor  o If on diabetes medications, continue as instructed   o If unable to consume normal meal plan, instructed to remain well hydrated   • Hypoglycemia & Treatment Guidelines:  o Reviewed what hypoglycemia is, signs and symptoms, and how to treat via the 15:15 rule    • Post-Partum Guidelines:  o Completion of 75 gm CHO 2 hr gtt at 6 weeks post-partum to check for Type 2 DM diagnosis  • Breastfeeding Guidelines:  o Continue GDM meal plan plus additional 350-500 calories daily  - Examples of protein and carbohydrate snacks provided  o Stay hydrated by drinking 8-10 (8 oz ) fluids daily  • Dining Out & Travel Guidelines:  o Patient advised to be prepared with extra diabetes supplies, medications, and snacks, as well as sticking to the same time schedule and portions eaten at home for meals and snacks  Patient Stated Goal: "I will involve my family in my diabetes care"  Goal Assessment: {[facility] GOAL ASSESSMENT:86790}    Diabetes Self Management Support Plan outside of ongoing care: Spouse/Family    Barriers to Learning/Change: No Barriers  Expected Compliance: good    Date to report blood sugars: Weekly   Follow up:  No follow-ups on file  Begin Time: ***  End Time: ***    It was a pleasure working with them today  Please feel free to call with any questions or concerns      Milton Arellano RD   Diabetes Educator  [facility]  Diabetes in Pregnancy Program  [facility], [address] [place] [address],[address]  [address]

## 2022-11-14 NOTE — PROGRESS NOTES
CLASS 2 - Individual  (in-person office visit )    Thank you for referring your patient to [facility] Maternal Fetal Medicine Diabetes in Pregnancy Program      Muna Vincent is a  25 y o  female who presents today with mother for Patient Education (Class 2; Individual) and Gestational Diabetes (33w1d)  Patient is at 33w1d gestation, Estimated Date of Delivery: 1/1/23  Visit Diagnosis:  Encounter Diagnosis     ICD-10-CM    1  Gestational diabetes mellitus (GDM) in third trimester, gestational diabetes method of control unspecified  O24 419    2  33 weeks gestation of pregnancy  Z3A 33       Reviewed and updated the following from patients medical record: PMH, Problem List, Allergies, and Current Medications      Labs  GDM LABS:  • 1 Hour GTT:   Lab Results   Component Value Date/Time    [id number] 177 (H) 10/10/2022 10:06 AM      • 3 Hour GTT:         Labs Ordered This Visit: None    Current Medications:  Current Outpatient Medications   Medication Instructions   • acetaminophen (TYLENOL) 650 mg, Oral, Every 6 hours PRN   • albuterol (Proventil HFA) 90 mcg/act inhaler 2 puffs, Inhalation, Every 6 hours PRN   • aspirin (ECOTRIN LOW STRENGTH) 162 mg, Oral, Daily   • Blood Glucose Monitoring Suppl (OneTouch Verio Flex System) w/Device KIT Test x4 Daily or as instructed   • Docosahexaenoic Acid (PreNatal DHA) 200 MG CAPS 1 capsule, Daily   • OneTouch Delica Lancets 60I MISC Use 4 a Day or as instructed   • OneTouch Verio test strip Test 4 Times Daily or as instructed   • Prenatal MV-Min-Fe Fum-FA-DHA (Prenatal+DHA) 28-0 975 & 200 MG MISC 1 tablet, Oral, Daily        Anthropometrics:  Ht Readings from Last 1 Encounters:   11/14/22 5' 4" (1 626 m)      Wt Readings from Last 3 Encounters:   11/14/22 65 7 kg (144 lb 12 8 oz)   11/11/22 65 4 kg (144 lb 2 9 oz)   11/04/22 65 kg (143 lb 6 4 oz)        Pre-Gravid Wt Pre-Gravid BMI TWG   50 3 kg (111 lb) 19 04 15 3 kg (33 lb 12 8 oz)     Total Pregnancy Weight Gain Recommendations: BMI (18 5-24 9) 25-35 lbs  • Current Wt Status Compared to Recommendations: Within Range -- Continue recommended rate of weight gain based on pre-pregnancy BMI till delivery    Most Recent Ultrasound Results:  • Findings: NML Growth/BRAYAN per OB   o Further Fetal Surveillance: Beginning at 32 weeks (NST + BRAYAN twice a week) = Not indicated at this time  • Next US date: To be scheduled as clinically indicated per OB    BLOOD GLUCOSE MONITORING:   Glucometer: OneTouch Verio Flex     Reinforced at [employer] Visit:   • Timing/Frequency of SMB x per day (Fasting, 2 hour after start of each meal)  • Goals: (Fasting) 60-90mg/dl // (1hr PP) <140mg/dl // (2hr PP) <120mg/dl  • Reporting Guidelines: Weekly via Phone: [phone] OR My Chart (Message with image attachment) OR Glucose Flowsheet  o Method of Reporting: [employer] Glucose Flowsheet    BG LOG:         Review of Blood Glucose Log:   • Limited assessment d/t pt testing blood sugars incorrectly; reviewed instructions for testing blood sugars with patient and mother    MEAL PLAN (Patient was provided with a meal plan including 3 meals and 3 snacks at class 1)  *Calories: 2000 calorie (CHO:45-15-60-15-60-30) (PRO: /3-1-3/4-1-3/-2)    Review of Patient's Current Diet (24hr Recall): refer to class 1 note for additional details     Bedtime: 8pm  Wake-Up: 2-3am   Breakfast: 7am     Breakfast: Bowl of Cereal (Cinnamon Toast Crunch) or Bagel with Butter; Sometimes skips breakfast  *Suggestions: Add Protein Sources -- Add nuts to cereal or have an egg on the side // Add Egg/Sausage to [place]   AM Snack: Bag of Chips (Lays)  *Suggestions: Try Baked Chips + Add Protein Source: Egg or Handful of Nuts  Lunch: Bagel with Butter   PM Snack: 1 or 2 Donuts  Dinner: Hot Pocket or Hot Dog or Fried Chicken  *Suggestions: Reviewed MyPlate Method  Bedtime Snack: Sometimes Bag of Chips    Beverages: No Sugar Sweetened Beverages    Meal Plan Recommendations Compliant? Comments:    Consistent CHO Intake No  - Reviewed CHO Counting   3 Meals and 3 Snacks No  - Discussed effect on blood sugar control   Protein w/ Every Meal and Snack No  - Discussed effect on blood sugar control   Eating every 2-3 5hrs while awake  No  - Discussed effect on blood sugar control   8-10hrs Fasting (from time of bedtime snack until first meal of the day) No  - Exceeding 10hrs Fasting Overnight: Reinforced recommended time frame of (8-10hrs) from time of bedtime snack     Overall Impression: Pt has a poor compliance and understanding of diet recommendations at this time  Reinforced Diet Instructions:  1  Individualized meal plan  2  Importance of consistent carbohydrate intake via 3 meals and 3 snacks per day   3  Importance of protein as it relates to blood glucose control  4  Encouraged  patient to eat every 2 0-3 5 hours while awake  5  Encouraged patient to go no longer than 8-10 hours fasting overnight until first meal of the day  6  Provided suggested meal/snack options to increase nutrition and maintain consistent meal and snack intakes  Physical Activity:  • Currently physically active? Yes, Walking every 2 days    Reviewed w/ Pt:   • Benefits of physical activity to optimize blood glucose control, encouraged activity at patient is physically able    o Instructed pt to always consult a physician prior to starting an exercise program    • Recommend 20-30 minutes daily  Additional Topics NOT Reviewed: To be reviewed at follow-up visit 11/23    • Medications: (reviewed options available with pt)  o Discussed if blood sugars are not within normal range with meal planning and exercise  o Reviewed medication such as metformin and/or basal/bolus insulin may be needed for better glucose control  • Maternal-Fetal Testing:   o Ultrasounds: growth scans every 4 weeks    o NST: twice weekly starting at 32nd week GA  o BRAYAN:  weekly starting at 32 weeks GA  • Sick day Guidelines:   o Advised that sickness will raise blood sugar   o If blood sugar is > 160 mg/dL twice in one day call doctor  o If on diabetes medications, continue as instructed   o If unable to consume normal meal plan, instructed to remain well hydrated   • Hypoglycemia & Treatment Guidelines:  o Reviewed what hypoglycemia is, signs and symptoms, and how to treat via the 15:15 rule  • Post-Partum Guidelines:  o Completion of 75 gm CHO 2 hr gtt at 6 weeks post-partum to check for Type 2 DM diagnosis  • Breastfeeding Guidelines:  o Continue GDM meal plan plus additional 350-500 calories daily  - Examples of protein and carbohydrate snacks provided  o Stay hydrated by drinking 8-10 (8 oz ) fluids daily  • Dining Out & Travel Guidelines:  o Patient advised to be prepared with extra diabetes supplies, medications, and snacks, as well as sticking to the same time schedule and portions eaten at home for meals and snacks  Patient Stated Goal: "I will involve my family in my diabetes care"  Goal Assessment: On track    Diabetes Self Management Support Plan outside of ongoing care: Spouse/Family    Barriers to Learning/Change: No Barriers  Expected Compliance: good    Date to report blood sugars: Weekly   Follow up:  Return in 9 days (on 11/23/2022) for Finish Class 2 w/ Deborah Villatoro RD  Begin Time: 10:13am  End Time: 11:20am    It was a pleasure working with them today  Please feel free to call with any questions or concerns      Deborah Villatoro RD   Diabetes Educator  [facility]  Diabetes in Pregnancy Program  [address] [place] [address],[address]  [address]

## 2022-11-18 ENCOUNTER — ROUTINE PRENATAL (OUTPATIENT)
Dept: OBGYN CLINIC | Facility: CLINIC | Age: 22
End: 2022-11-18

## 2022-11-18 VITALS — WEIGHT: 145 LBS | DIASTOLIC BLOOD PRESSURE: 70 MMHG | BODY MASS INDEX: 24.89 KG/M2 | SYSTOLIC BLOOD PRESSURE: 110 MMHG

## 2022-11-18 DIAGNOSIS — Z3A.33 33 WEEKS GESTATION OF PREGNANCY: ICD-10-CM

## 2022-11-18 DIAGNOSIS — Z34.93 PRENATAL CARE IN THIRD TRIMESTER: Primary | ICD-10-CM

## 2022-11-18 PROBLEM — Z3A.32 32 WEEKS GESTATION OF PREGNANCY: Status: ACTIVE | Noted: 2022-11-18

## 2022-11-18 PROBLEM — Z34.90 PREGNANCY: Status: ACTIVE | Noted: 2022-07-01

## 2022-11-18 NOTE — PATIENT INSTRUCTIONS
Pregnancy at 31 to 34 Weeks   AMBULATORY CARE:   Changes happening with your body: You may continue to have symptoms such as shortness of breath, heartburn, contractions, or swelling of your ankles and feet  You may be gaining about 1 pound a week now  Seek care immediately if:   You develop a severe headache that does not go away  You have new or increased vision changes, such as blurred or spotted vision  You have new or increased swelling in your face or hands  You have vaginal spotting or bleeding  Your water broke or you feel warm water gushing or trickling from your vagina  Call your obstetrician if:   You have more than 5 contractions in 1 hour  You notice any changes in your baby's movements  You have abdominal cramps, pressure, or tightening  You have a change in vaginal discharge  You have chills or a fever  You have vaginal itching, burning, or pain  You have yellow, green, white, or foul-smelling vaginal discharge  You have pain or burning when you urinate, less urine than usual, or pink or bloody urine  You have questions or concerns about your condition or care  How to care for yourself at this stage of your pregnancy:       Eat a variety of healthy foods  Healthy foods include fruits, vegetables, whole-grain breads, low-fat dairy foods, beans, lean meats, and fish  Drink liquids as directed  Ask how much liquid to drink each day and which liquids are best for you  Limit caffeine to less than 200 milligrams each day  Limit your intake of fish to 2 servings each week  Choose fish low in mercury such as canned light tuna, shrimp, salmon, cod, or tilapia  Do not  eat fish high in mercury such as swordfish, tilefish, mariah mackerel, and shark  Manage heartburn  by eating 4 or 5 small meals each day instead of large meals  Avoid spicy food  Manage swelling  by lying down and putting your feet up  Take prenatal vitamins as directed    Your need for certain vitamins and minerals, such as folic acid, increases during pregnancy  Prenatal vitamins provide some of the extra vitamins and minerals you need  Prenatal vitamins may also help to decrease the risk of certain birth defects  Talk to your healthcare provider about exercise  Moderate exercise can help you stay fit  Your healthcare provider will help you plan an exercise program that is safe for you during pregnancy  Do not smoke  Smoking increases your risk of a miscarriage and other health problems during your pregnancy  Smoking can cause your baby to be born too early or weigh less at birth  Ask your healthcare provider for information if you need help quitting  Do not drink alcohol  Alcohol passes from your body to your baby through the placenta  It can affect your baby's brain development and cause fetal alcohol syndrome (FAS)  FAS is a group of conditions that causes mental, behavior, and growth problems  Talk to your healthcare provider before you take any medicines  Many medicines may harm your baby if you take them when you are pregnant  Do not take any medicines, vitamins, herbs, or supplements without first talking to your healthcare provider  Never use illegal or street drugs (such as marijuana or cocaine) while you are pregnant  Safety tips during pregnancy:   Avoid hot tubs and saunas  Do not use a hot tub or sauna while you are pregnant, especially during your first trimester  Hot tubs and saunas may raise your baby's temperature and increase the risk of birth defects  Avoid toxoplasmosis  This is an infection caused by eating raw meat or being around infected cat feces  It can cause birth defects, miscarriages, and other problems  Wash your hands after you touch raw meat  Make sure any meat is well-cooked before you eat it  Avoid raw eggs and unpasteurized milk  Use gloves or ask someone else to clean your cat's litter box while you are pregnant  Changes happening with your baby:  By 34 weeks, your baby may weigh more than 5 pounds  Your baby will be about 12 ½ inches long from the top of the head to the rump (baby's bottom)  Your baby is gaining about ½ pound a week  Your baby's eyes open and close now  Your baby's kicks and movements are more forceful at this time  What you need to know about prenatal care: Your healthcare provider will check your blood pressure and weight  You may also need the following:  A urine test  may also be done to check for sugar and protein  These can be signs of gestational diabetes or infection  Protein in your urine may also be a sign of preeclampsia  Preeclampsia is a condition that can develop during week 20 or later of your pregnancy  It causes high blood pressure, and it can cause problems with your kidneys and other organs  A gestational diabetes screen  may be done  Your healthcare provider may order either a 1-step or 2-step oral glucose tolerance test (OGTT)  1-step OGTT:  Your blood sugar level will be tested after you have not eaten for 8 hours (fasting)  You will then be given a glucose drink  Your level will be tested again 1 hour and 2 hours after you finish the drink  2-step OGTT:  You do not have to fast for the first part of the test  You will have the glucose drink at any time of day  Your blood sugar level will be checked 1 hour later  If your blood sugar is higher than a certain level, another test will be ordered  You will fast and your blood sugar level will be tested  You will have the glucose drink  Your blood will be tested again 1 hour, 2 hours, and 3 hours after you finish the glucose drink  A Tdap vaccine  may be recommended by your healthcare provider  Fundal height  is a measurement of your uterus to check your baby's growth  This number is usually the same as the number of weeks that you have been pregnant   Your healthcare provider may also check your baby's position  Your baby's heart rate  will be checked  Follow up with your obstetrician as directed:  Write down your questions so you remember to ask them during your visits  © Copyright [employer] 2022 Information is for End User's use only and may not be sold, redistributed or otherwise used for commercial purposes  All illustrations and images included in CareNotes® are the copyrighted property of A D A M , Inc  or [employer]   The above information is an  only  It is not intended as medical advice for individual conditions or treatments  Talk to your doctor, nurse or pharmacist before following any medical regimen to see if it is safe and effective for you

## 2022-11-18 NOTE — PROGRESS NOTES
Rachael Amin is a 26 yo  at 33w5d presenting for routine [facility]- She does report daily cramping- no associated loss of fluid, vaginal bleeding, dysuria or vaginal discharge  Does report good fetal movement  Urine neg/neg  SVE: Closed/thich/high and posterior  Will send UA/ UCx given risk of UTI with SCT  Reports following with the diabetic educators and notes that her fingersticks are improved following some dietary changes  Does report daily headaches that do resolve on their own- blood pressure today is within normal limits and there is no protein urine  We did review preeclamptic precautions today and to call with any concerns  RTO in 2 weeks or sooner if needed

## 2022-11-18 NOTE — PROGRESS NOTES
A fetal ultrasound was completed  See Ob procedures in Epic for an interpretation and recommendations  Do not hesitate to contact us in [facility] if you have questions  Kulwinder Huerta MD, [address] [facility]  Maternal Fetal Medicine

## 2022-11-20 LAB
APPEARANCE UR: CLEAR
BACTERIA UR CULT: NORMAL
BACTERIA URNS QL MICRO: NORMAL
BILIRUB UR QL STRIP: NEGATIVE
CASTS URNS QL MICRO: NORMAL /LPF
COLOR UR: YELLOW
EPI CELLS #/AREA URNS HPF: NORMAL /HPF (ref 0–10)
GLUCOSE UR QL: NEGATIVE
HGB UR QL STRIP: NEGATIVE
KETONES UR QL STRIP: NEGATIVE
LEUKOCYTE ESTERASE UR QL STRIP: NEGATIVE
Lab: NORMAL
MICRO URNS: NORMAL
MICRO URNS: NORMAL
NITRITE UR QL STRIP: NEGATIVE
PH UR STRIP: 7.5 [PH] (ref 5–7.5)
PROT UR QL STRIP: NEGATIVE
RBC #/AREA URNS HPF: NORMAL /HPF (ref 0–2)
SP GR UR: 1.01 (ref 1–1.03)
UROBILINOGEN UR STRIP-ACNC: 0.2 MG/DL (ref 0.2–1)
WBC #/AREA URNS HPF: NORMAL /HPF (ref 0–5)

## 2022-11-23 ENCOUNTER — TELEPHONE (OUTPATIENT)
Dept: PERINATAL CARE | Facility: CLINIC | Age: 22
End: 2022-11-23

## 2022-11-23 NOTE — TELEPHONE ENCOUNTER
No Show Documentation  Diabetes in Pregnancy Program    Steph Marcos was a no show today (11/23/22) to finish class 2 of the Diabetes and Pregnancy Program   • Pt was instructed to reach out to Alva Christensen at [phone] to reschedule    •   Beltran Castelan RD   Diabetes Educator  [address] [facility]

## 2022-12-01 NOTE — PROGRESS NOTES
OB/GYN  PN Visit  Duke Barone  [id number]  12/2/2022  9:23 AM  Dr Alonso Arnold MD    S: 25 y o  Page Ore 35w4d here for PN visit  She denies contractions, but sometimes has cramping  She denies leakage of fluid and vaginal bleeding  She reports good fetal movement  She denies nausea, vomiting, headache, cramping, edema, domestic violence, and smoking  Her pregnancy is complicated by GDM, anemia and sickle cell trait  O:  Vitals:    12/02/22 0910   BP: 102/68     Physical Exam  Vitals reviewed  Constitutional:       General: She is not in acute distress  Appearance: Normal appearance  She is well-developed  She is not ill-appearing, toxic-appearing or diaphoretic  Comments: Patient very quiet during the appointment   Cardiovascular:      Rate and Rhythm: Normal rate  Pulmonary:      Effort: Pulmonary effort is normal  No respiratory distress  Abdominal:      General: There is no distension  Palpations: Abdomen is soft  There is no mass  Tenderness: There is no abdominal tenderness  There is no guarding or rebound  Genitourinary:     Comments: Gravid, nontender  Skin:     General: Skin is warm and dry  Neurological:      Mental Status: She is alert and oriented to person, place, and time     Psychiatric:         Mood and Affect: Mood normal          Behavior: Behavior normal        Fundal height: 35cm  FHT: 135bpm     A/P:    Problem List        Unprioritized    [facility] in mother [facility]    Overview     PADMA Martinez status unknown  Patient has sister with disease  Aware of screening recommendation for partner and diagnostic testing options         Pregnancy    Anemia in pregnancy, second trimester    Current Assessment & Plan     S/p Venofer infusions         Diet controlled gestational diabetes mellitus (GDM) in third trimester    Current Assessment & Plan     She did not finish Class 2 of the Diabetes and Pregnancy Program  Patient encouraged to reschedule  Patient has been reporting her BG but with only sporadic numbers; Patient should check 4x/day as instructed  No results found for: HGBA1C         35 weeks gestation of pregnancy    Current Assessment & Plan     - Continue PNV& ASA  - Labor precautions reviewed  - Fetal kick counts reviewed  - Labs: UTD  - Ultrasounds: Most recent US wnl on 22  - Tdap: Administered 10/21/22  - Flu Shot: Administered 10/3/22  - COVID: Declined  - Delivery:  with epidural  - Contraception: Discussed options;  Undecided  - Breastfeeding: Both  - RTO in 1 week              Future Appointments   Date Time Provider Jay Goldstein   2022  9:15 AM Amena Gross MD  [facility]   2022  9:15 AM Surinder Prater MD  [facility]   2022  9:45 AM MD Jemal Loya MD  2022  9:23 AM

## 2022-12-02 ENCOUNTER — ROUTINE PRENATAL (OUTPATIENT)
Dept: OBGYN CLINIC | Facility: CLINIC | Age: 22
End: 2022-12-02

## 2022-12-02 VITALS — DIASTOLIC BLOOD PRESSURE: 68 MMHG | WEIGHT: 148.2 LBS | SYSTOLIC BLOOD PRESSURE: 102 MMHG | BODY MASS INDEX: 25.44 KG/M2

## 2022-12-02 DIAGNOSIS — Z3A.35 35 WEEKS GESTATION OF PREGNANCY: ICD-10-CM

## 2022-12-02 DIAGNOSIS — O24.410 DIET CONTROLLED GESTATIONAL DIABETES MELLITUS (GDM) IN THIRD TRIMESTER: Primary | ICD-10-CM

## 2022-12-02 DIAGNOSIS — O99.019 SICKLE CELL TRAIT IN MOTHER AFFECTING PREGNANCY (HCC): ICD-10-CM

## 2022-12-02 DIAGNOSIS — D57.3 SICKLE CELL TRAIT IN MOTHER AFFECTING PREGNANCY (HCC): ICD-10-CM

## 2022-12-02 DIAGNOSIS — O99.012 ANEMIA IN PREGNANCY, SECOND TRIMESTER: ICD-10-CM

## 2022-12-02 NOTE — ASSESSMENT & PLAN NOTE
She did not finish Class 2 of the Diabetes and Pregnancy Program  Patient encouraged to reschedule  Patient has been reporting her BG but with only sporadic numbers;  Patient should check 4x/day as instructed  No results found for: HGBA1C

## 2022-12-12 ENCOUNTER — TELEPHONE (OUTPATIENT)
Dept: PERINATAL CARE | Facility: CLINIC | Age: 22
End: 2022-12-12

## 2022-12-16 ENCOUNTER — ROUTINE PRENATAL (OUTPATIENT)
Dept: OBGYN CLINIC | Facility: CLINIC | Age: 22
End: 2022-12-16

## 2022-12-16 VITALS — WEIGHT: 150.8 LBS | DIASTOLIC BLOOD PRESSURE: 60 MMHG | BODY MASS INDEX: 25.88 KG/M2 | SYSTOLIC BLOOD PRESSURE: 100 MMHG

## 2022-12-16 DIAGNOSIS — O24.410 DIET CONTROLLED GESTATIONAL DIABETES MELLITUS (GDM) IN THIRD TRIMESTER: Primary | ICD-10-CM

## 2022-12-16 DIAGNOSIS — Z3A.37 37 WEEKS GESTATION OF PREGNANCY: ICD-10-CM

## 2022-12-16 NOTE — PROGRESS NOTES
Tom Nguyen is a 26 yo  at 37w5d presenting for routine PNC- does report irregular cramping, denies LOF or VB  Endorses good FM  Urine trace protein, neg glucose  GBS collected today and reviewed  SVE /2  GDM: Patient has noticed elevated fasting glucoses and has not been checking 4x daily- reviewed 9m snack before bed to help assist with fasting glucose and to continue to report FS to diabetic educators  Reviewed recommendations for IOL 39-40 weeks  Patient will consider and let us know next week if she would like scheduled  Labor talk completed- RTO in 1 week

## 2022-12-16 NOTE — PATIENT INSTRUCTIONS
Pregnancy at 28 to 38 Weeks   AMBULATORY CARE:   Changes happening with your body: You are considered full term at the beginning of 37 weeks  Your breathing may be easier if your baby has moved down into a head-down position  You may need to urinate more often because the baby may be pressing on your bladder  You may also feel more discomfort and get tired easily  Seek care immediately if:   You develop a severe headache that does not go away  You have new or increased vision changes, such as blurred or spotted vision  You have new or increased swelling in your face or hands  You have vaginal spotting or bleeding  Your water broke or you feel warm water gushing or trickling from your vagina  Call your obstetrician if:   You have more than 5 contractions in 1 hour  You notice any changes in your baby's movements  You have abdominal cramps, pressure, or tightening  You have a change in vaginal discharge  You have chills or a fever  You have vaginal itching, burning, or pain  You have yellow, green, white, or foul-smelling vaginal discharge  You have pain or burning when you urinate, less urine than usual, or pink or bloody urine  You have questions or concerns about your condition or care  How to care for yourself at this stage of your pregnancy:       Eat a variety of healthy foods  Healthy foods include fruits, vegetables, whole-grain breads, low-fat dairy foods, beans, lean meats, and fish  Drink liquids as directed  Ask how much liquid to drink each day and which liquids are best for you  Limit caffeine to less than 200 milligrams each day  Limit your intake of fish to 2 servings each week  Choose fish low in mercury such as canned light tuna, shrimp, salmon, cod, or tilapia  Do not  eat fish high in mercury such as swordfish, tilefish, mariah mackerel, and shark  Take prenatal vitamins as directed    Your need for certain vitamins and minerals, such as folic acid, increases during pregnancy  Prenatal vitamins provide some of the extra vitamins and minerals you need  Prenatal vitamins may also help to decrease the risk of certain birth defects  Rest as needed  Put your feet up if you have swelling in your ankles and feet  Talk to your healthcare provider about exercise  Moderate exercise can help you stay fit  Your healthcare provider will help you plan an exercise program that is safe for you during pregnancy  Do not smoke  Smoking increases your risk of a miscarriage and other health problems during your pregnancy  Smoking can cause your baby to be born early or weigh less at birth  Ask your healthcare provider for information if you need help quitting  Do not drink alcohol  Alcohol passes from your body to your baby through the placenta  It can affect your baby's brain development and cause fetal alcohol syndrome (FAS)  FAS is a group of conditions that causes mental, behavior, and growth problems  Talk to your healthcare provider before you take any medicines  Many medicines may harm your baby if you take them when you are pregnant  Do not take any medicines, vitamins, herbs, or supplements without first talking to your healthcare provider  Never use illegal or street drugs (such as marijuana or cocaine) while you are pregnant  Safety tips during pregnancy:   Avoid hot tubs and saunas  Do not use a hot tub or sauna while you are pregnant, especially during your first trimester  Hot tubs and saunas may raise your baby's temperature and increase the risk of birth defects  Avoid toxoplasmosis  This is an infection caused by eating raw meat or being around infected cat feces  It can cause birth defects, miscarriages, and other problems  Wash your hands after you touch raw meat  Make sure any meat is well-cooked before you eat it  Avoid raw eggs and unpasteurized milk   Use gloves or ask someone else to clean your cat's litter box while you are pregnant  Ask your healthcare provider about travel  The most comfortable time to travel is during the second trimester  Ask your provider if you can travel after 36 weeks  You may not be able to travel in an airplane after 36 weeks  He or she may also recommend you avoid long road trips  Changes happening with your baby:  By 38 weeks, your baby may weigh between 6 and 9 pounds  Your baby may be about 14 inches long from the top of the head to the rump (baby's bottom)  Your baby hears well enough to know your voice  As your baby gets larger, you may feel fewer kicks and more stretching and rolling  Your baby may move into a head-down position  Your baby will also rest lower in your abdomen  What you need to know about prenatal care: Your healthcare provider will check your blood pressure and weight  You may also need the following:  A urine test  may also be done to check for sugar and protein  These can be signs of gestational diabetes or infection  Protein in your urine may also be a sign of preeclampsia  Preeclampsia is a condition that can develop during week 20 or later of your pregnancy  It causes high blood pressure, and it can cause problems with your kidneys and other organs  A gestational diabetes screen  may be done  Your healthcare provider may order either a 1-step or 2-step oral glucose tolerance test (OGTT)  1-step OGTT:  Your blood sugar level will be tested after you have not eaten for 8 hours (fasting)  You will then be given a glucose drink  Your level will be tested again 1 hour and 2 hours after you finish the drink  2-step OGTT:  You do not have to fast for the first part of the test  You will have the glucose drink at any time of day  Your blood sugar level will be checked 1 hour later  If your blood sugar is higher than a certain level, another test will be ordered  You will fast and your blood sugar level will be tested  You will have the glucose drink  Your blood will be tested again 1 hour, 2 hours, and 3 hours after you finish the glucose drink  A blood test  may be done to check for anemia (low iron level)  A Tdap vaccine  may be recommended by your healthcare provider  A group B strep test  is a test that is done to check for group B strep infection  Group B strep is a type of bacteria that may be found in the vagina or rectum  It can be passed to your baby during delivery if you have it  Your healthcare provider will take swab your vagina or rectum and send the sample to the lab for tests  Fundal height  is a measurement of your uterus to check your baby's growth  This number is usually the same as the number of weeks that you have been pregnant  Your healthcare provider may also check your baby's position  Your baby's heart rate  will be checked  Follow up with your obstetrician as directed:  Write down your questions so you remember to ask them during your visits  © [employer] 2022 Information is for End User's use only and may not be sold, redistributed or otherwise used for commercial purposes  All illustrations and images included in CareNotes® are the copyrighted property of A D A M , Inc  or [employer]   The above information is an  only  It is not intended as medical advice for individual conditions or treatments  Talk to your doctor, nurse or pharmacist before following any medical regimen to see if it is safe and effective for you  Kick Counts in Pregnancy   WHAT YOU NEED TO KNOW:   Kick counts measure how much your baby is moving in your womb  A kick from your baby can be felt as a twist, turn, swish, roll, or jab  It is common to feel your baby kicking at 26 to 28 weeks of pregnancy  You may feel your baby kick as early as 20 weeks of pregnancy  You may want to start counting at 28 weeks     DISCHARGE INSTRUCTIONS:   Contact your doctor immediately if:   You feel a change in the number of kicks or movements of your baby  You feel fewer than 10 kicks within 2 hours  You have questions or concerns about your baby's movements  Why measure kick counts:  Your baby's movement may provide information about your baby's health  He or she may move less, or not at all, if there are problems  Your baby may move less if he or she is not getting enough oxygen or nutrition from the placenta  Do not smoke while you are pregnant  Smoking decreases the amount of oxygen that gets to your baby  Talk to your healthcare provider if you need help to quit smoking  Tell your healthcare provider as soon as you feel a change in your baby's movements  When to measure kick counts:   Measure kick counts at the same time every day  Measure kick counts when your baby is awake and most active  Your baby may be most active in the evening  How to measure kick counts:  Check that your baby is awake before you measure kick counts  You can wake up your baby by lightly pushing on your belly, walking, or drinking something cold  Your healthcare provider may tell you different ways to measure kick counts  You may be told to do the following:  Use a chart or clock to keep track of the time you start and finish counting  Sit in a chair or lie on your left side  Place your hands on the largest part of your belly  Count until you reach 10 kicks  Write down how much time it takes to count 10 kicks  It may take 30 minutes to 2 hours to count 10 kicks  It should not take more than 2 hours to count 10 kicks  Follow up with your doctor as directed:  Write down your questions so you remember to ask them during your visits  © Copyright [employer] 2022 Information is for End User's use only and may not be sold, redistributed or otherwise used for commercial purposes   All illustrations and images included in CareNotes® are the copyrighted property of [employer]  or [employer]  The above information is an  only  It is not intended as medical advice for individual conditions or treatments  Talk to your doctor, nurse or pharmacist before following any medical regimen to see if it is safe and effective for you

## 2022-12-17 ENCOUNTER — HOSPITAL ENCOUNTER (OUTPATIENT)
Facility: HOSPITAL | Age: 22
Discharge: HOME | End: 2022-12-17
Attending: OBSTETRICS & GYNECOLOGY | Admitting: OBSTETRICS & GYNECOLOGY

## 2022-12-17 VITALS
SYSTOLIC BLOOD PRESSURE: 120 MMHG | BODY MASS INDEX: 25.61 KG/M2 | HEIGHT: 64 IN | RESPIRATION RATE: 16 BRPM | HEART RATE: 94 BPM | WEIGHT: 150 LBS | DIASTOLIC BLOOD PRESSURE: 64 MMHG | TEMPERATURE: 98.7 F

## 2022-12-18 LAB — GP B STREP DNA SPEC QL NAA+PROBE: NEGATIVE

## 2022-12-18 NOTE — PROGRESS NOTES
L&D Triage Note - OB/GYN  Akil Godoy 25 y o  female MRN: [id number]  Unit/Bed#:  TRIAGE 3 Encounter: [id number]      ASSESSMENT:    Akil Godoy is a 25 y o   at 37w6d  who was evaluated today in triage to rule out labor as well as rutpture  Due to the reassuring work up described below, the patient does not appear to be in labor or ruptured and it is safe to send her home  PLAN:    1) Speculum Exam  2) BRAYAN  3) SVE  4) Continue routine prenatal care  5) Discharge from [facility] triage with term labor precautions    - Reviewed rupture of membranes, false vs true labor, decreased fetal movement, and vaginal bleeding   - Pt to call provider with any concerns and follow up at her next scheduled prenatal appointment   - Case discussed with Dr Kaylen Shaw:    Akil Godoy 25 y o  Lora Apo at 37w6d with an Estimated Date of Delivery: 23 who presents today with a chief complaint of loss of fluid as well as contractions  Patient states that she has been leaking fluid since   Does not remember ever having a large gush of fluid but just feels as though she has had increased discharge since this time  Patient denies soaking through pads etc   Patient has also had intermittent contractions since yesterday  She states that they are currently every 2 to 3 minutes  She endorses good fetal movement and denies vaginal bleeding  Of note she had intercourse several hours before presentation  she was seen in the office yesterday at which time she was fingertip/50/-2  This pregnancy has otherwise been complicated by diet-controlled gestational diabetes for which she sees diabetic education  She is also a carrier of sickle cell trait  Patient has no other past medical history            OBJECTIVE:    Vitals:    22   BP: 120/64   Pulse: 94   Resp: 16   Temp: 98 7 °F (37 1 °C)       ROS:  Constitutional: Negative  Respiratory: Negative  Cardiovascular: Negative Referred by: Flip Douglas MD; Medical Diagnosis (from order):  S/p left shoulder arthroscopic SAD and debridement of labral tear    Physical Therapy -  Daily Treatment Note    Visit:  Visit count could not be calculated. Make sure you are using a visit which is associated with an episode.   Next referring provider appointment: 7/22/2020        SUBJECTIVE                                                                                                             [employer]    Patient currently reports, \"I'm achy,now I have some light numbness in my pinky and ring finger on that L hand. I also feel my L  is weaker--any exercises for that?    Patient is a 42 year old male with progressive pain and dysfunction in the shoulder following L shoulder injury at work on 1/1/2020.. The patient received PT last winter following the injury with little to no progress. An MRI arthrogram this past January confirmed a labral tear. He  elected to proceed with the above surgery.  He is approximately 4 weeks s/p left shoulder arthroscopy with extensive debridement, including debridement of labral tear, and arthroscopic acromioplasty. He reports he has weaned himself out of the sling about 1 week ago. Sleeping has been difficult getting comfortable in bed, so is sleeping either on the couch or in a recliner. He did wear his sling this past weekend when he mowed the lawn with his riding mower. He denies N/T into the left hand/digits. He is not taking any pain medication since 1 week post-op. Has an Ice-Man and is icing the L shoulder several times a day       Functional Change: Improved AAROM      Pain / Symptoms:  Pain rating (out of 10): Current: 3 ; Best: 2; Worst: 5  Location: L shoulder   Quality / Description: ache, sore, stiff, tight, sharp.  Alleviating Factors: ice, rest. Stretches    Progression since onset: improved    OBJECTIVE                                                                                                                           TREATMENT                                                                                                                initial evaluation completed  Therapeutic Exercise:  7-14-20:  *Patient was instr in scapular/spinal stabilization: scap R/D w/ abset & gs in sit/std: REVIEW  *   Codmans: 3#, all 4 dir x10-20 repetitions ea L  *   Table slides: FF/Abd/ER: 5\" x 5 repetitions ea L-DISCHARGED  * wall slides: FF/Abd: 5\" x 5 repetitions ea L  *   MODIFIED: <90 Abd (ER) doorway stretch: 5\" x 5 repetitions L  *   (N)IR towel (uses hands) stretch: 5\" x 5 repetitions L  * Pulleys: Flx/Abd: 5\" x 5 repetitions ea L  *Isometrics: all 6 dir, 5\" x 10 repetitions ea L--DISCHARGED  * PRE's: FF/Abd/D1Flx: 1# x 10 repetitions full range  * (elbow on counter) 90/90 ER: 1# x 5 repetitions  *TB: low rows/pull downs, (N)ER/IR red tb x 20 repetitions   ea L  *ADDED: wall push ups x 10 reps  *ADDED: yellow putty: full , duck bill , thumb to finger opposition x 5-20 repetitions  *ADDED: Ulnar n glide II: fingers pointed to down           Skilled input: verbal instruction/cues and posture correction    Home Exercise Program: (*above indicates provided as part of home exercise program)  See therapeutic exercise above      ASSESSMENT                                                                                                             Patient tolerated treatment well, note good end range achieved with AAROM R shoulder. Patient declined modalities and will ice at home after tx    Pain/symptoms after session: 3     PLAN                                                                                                                             Suggestions for next session as indicated: Progress per plan of care: advance P/AA/AROM, progress scapular/shoulder stabilization, strengthening and postural control. Will progress to work simulation tasks as able, add modalities and manual  Gastrointestinal: Negative    General Physical Exam:  General: in no apparent distress, non-toxic and alert  Cardiovascular: Cor RRR  Lungs: non-labored breathing  Abdomen: abdomen is soft without significant tenderness, masses, organomegaly or guarding  Lower extremeties: nontender      Fetal monitoring:  FHT:  130 bpm/ Moderate 6 - 25 bpm / 15 x 15 accelerations present, no decelerations  [place]: contractions noted sporadically      Cervical Exam  Speculum: Scant white watery discharge noted, slight erythema of cervix, difficulty visualizing entire cervix due to patient's ability to tolerate exam    KOH/WTMT:     Infection:   -No clue cells    -No hyphae   -No trichomonads present    Membrane status   -Negative ferning   -Positive nitrazene   -Negative pooling       SVE: 1 / 50% / -3        BRAYNA   -Quadrant 1: 3 36 cm   -Quadrant 2: 1 12 cm   -Quadrant 3: 2 63 cm   -Quadrant 4: 0 79 cm   -Total: 10 9          Phan Brewer MD,  OBGYN PGY-1  12/17/2022 8:12 PM therapy prn       Procedures and total treatment time documented Time Entry flowsheet.

## 2022-12-18 NOTE — PROCEDURES
Ryan Joya, a  at 37w6d with an MAGY of 2023, by Last Menstrual Period, was seen at [address] for the following procedure(s): $Procedure Type: BRAYAN]         4 Quadrant BRAYAN  BRAYAN Q1 (cm): 3 4 cm  BRAYAN Q2 (cm): 4 1 cm  BRAYAN Q3 (cm): 2 6 cm  BRAYAN Q4 (cm): 0 8 cm  BRAYAN TOTAL (cm): 10 9 cm         Ultrasound Other  Fetal Presentation: Marceline Holstein, MD  OBGYN PGY-1  2022 9:19 PM

## 2022-12-18 NOTE — DISCHARGE INSTRUCTIONS
Pregnancy at [address] to [address]:   What changes are happening with my body? You are considered full term at the beginning of 37 weeks  Your breathing may be easier if your baby has moved down into a head-down position  You may need to urinate more often because the baby may be pressing on your bladder  You may also feel more discomfort and get tired easily  How do I care for myself at this stage of my pregnancy? Eat a variety of healthy foods  Healthy foods include fruits, vegetables, whole-grain breads, low-fat dairy foods, beans, lean meats, and fish  Drink liquids as directed  Ask how much liquid to drink each day and which liquids are best for you  Limit caffeine to less than 200 milligrams each day  Limit your intake of fish to 2 servings each week  Choose fish low in mercury such as canned light tuna, shrimp, salmon, cod, or tilapia  Do not  eat fish high in mercury such as swordfish, tilefish, mariah mackerel, and shark  Take prenatal vitamins as directed  Your need for certain vitamins and minerals, such as folic acid, increases during pregnancy  Prenatal vitamins provide some of the extra vitamins and minerals you need  Prenatal vitamins may also help to decrease the risk of certain birth defects  Rest as needed  Put your feet up if you have swelling in your ankles and feet  Talk to your healthcare provider about exercise  Moderate exercise can help you stay fit  Your healthcare provider will help you plan an exercise program that is safe for you during pregnancy  Do not smoke  Smoking increases your risk of a miscarriage and other health problems during your pregnancy  Smoking can cause your baby to be born early or weigh less at birth  Ask your healthcare provider for information if you need help quitting  Do not drink alcohol  Alcohol passes from your body to your baby through the placenta   It can affect your baby's brain development and cause fetal alcohol syndrome (FAS)  FAS is a group of conditions that causes mental, behavior, and growth problems  Talk to your healthcare provider before you take any medicines  Many medicines may harm your baby if you take them when you are pregnant  Do not take any medicines, vitamins, herbs, or supplements without first talking to your healthcare provider  Never use illegal or street drugs (such as marijuana or cocaine) while you are pregnant  What are some safety tips during pregnancy? Avoid hot tubs and saunas  Do not use a hot tub or sauna while you are pregnant, especially during your first trimester  Hot tubs and saunas may raise your baby's temperature and increase the risk of birth defects  Avoid toxoplasmosis  This is an infection caused by eating raw meat or being around infected cat feces  It can cause birth defects, miscarriages, and other problems  Wash your hands after you touch raw meat  Make sure any meat is well-cooked before you eat it  Avoid raw eggs and unpasteurized milk  Use gloves or ask someone else to clean your cat's litter box while you are pregnant  Ask your healthcare provider about travel  The most comfortable time to travel is during the second trimester  Ask your provider if you can travel after 36 weeks  You may not be able to travel in an airplane after 36 weeks  He or she may also recommend you avoid long road trips  What changes are happening with my baby? By 38 weeks, your baby may weigh between 6 and 9 pounds  Your baby may be about 14 inches long from the top of the head to the rump (baby's bottom)  Your baby hears well enough to know your voice  As your baby gets larger, you may feel fewer kicks and more stretching and rolling  Your baby may move into a head-down position  Your baby will also rest lower in your abdomen  What do I need to know about prenatal care? Your healthcare provider will check your blood pressure and weight   You may also need the following:  A urine test  may also be done to check for sugar and protein  These can be signs of gestational diabetes or infection  Protein in your urine may also be a sign of preeclampsia  Preeclampsia is a condition that can develop during week 20 or later of your pregnancy  It causes high blood pressure, and it can cause problems with your kidneys and other organs  A gestational diabetes screen  may be done  Your healthcare provider may order either a 1-step or 2-step oral glucose tolerance test (OGTT)  1-step OGTT:  Your blood sugar level will be tested after you have not eaten for 8 hours (fasting)  You will then be given a glucose drink  Your level will be tested again 1 hour and 2 hours after you finish the drink  2-step OGTT:  You do not have to fast for the first part of the test  You will have the glucose drink at any time of day  Your blood sugar level will be checked 1 hour later  If your blood sugar is higher than a certain level, another test will be ordered  You will fast and your blood sugar level will be tested  You will have the glucose drink  Your blood will be tested again 1 hour, 2 hours, and 3 hours after you finish the glucose drink  A blood test  may be done to check for anemia (low iron level)  A Tdap vaccine  may be recommended by your healthcare provider  A group B strep test  is a test that is done to check for group B strep infection  Group B strep is a type of bacteria that may be found in the vagina or rectum  It can be passed to your baby during delivery if you have it  Your healthcare provider will take swab your vagina or rectum and send the sample to the lab for tests  Fundal height  is a measurement of your uterus to check your baby's growth  This number is usually the same as the number of weeks that you have been pregnant  Your healthcare provider may also check your baby's position  Your baby's heart rate  will be checked      When should I seek immediate care? You develop a severe headache that does not go away  You have new or increased vision changes, such as blurred or spotted vision  You have new or increased swelling in your face or hands  You have vaginal spotting or bleeding  Your water broke or you feel warm water gushing or trickling from your vagina  When should I call my obstetrician? You have more than 5 contractions in 1 hour  You notice any changes in your baby's movements  You have abdominal cramps, pressure, or tightening  You have a change in vaginal discharge  You have chills or a fever  You have vaginal itching, burning, or pain  You have yellow, green, white, or foul-smelling vaginal discharge  You have pain or burning when you urinate, less urine than usual, or pink or bloody urine  You have questions or concerns about your condition or care  CARE AGREEMENT:   You have the right to help plan your care  Learn about your health condition and how it may be treated  Discuss treatment options with your healthcare providers to decide what care you want to receive  You always have the right to refuse treatment  The above information is an  only  It is not intended as medical advice for individual conditions or treatments  Talk to your doctor, nurse or pharmacist before following any medical regimen to see if it is safe and effective for you  © Copyright [employer] 2022 Information is for End User's use only and may not be sold, redistributed or otherwise used for commercial purposes   All illustrations and images included in CareNotes® are the copyrighted property of A D A M , Inc  or [address] [place] [address]

## 2022-12-21 ENCOUNTER — HOSPITAL ENCOUNTER (OUTPATIENT)
Facility: HOSPITAL | Age: 22
Discharge: HOME | End: 2022-12-21
Attending: OBSTETRICS & GYNECOLOGY | Admitting: OBSTETRICS & GYNECOLOGY

## 2022-12-21 VITALS
OXYGEN SATURATION: 100 % | DIASTOLIC BLOOD PRESSURE: 62 MMHG | HEART RATE: 96 BPM | RESPIRATION RATE: 18 BRPM | SYSTOLIC BLOOD PRESSURE: 111 MMHG | TEMPERATURE: 98.2 F

## 2022-12-21 PROBLEM — Z3A.38 38 WEEKS GESTATION OF PREGNANCY: Status: ACTIVE | Noted: 2022-11-18

## 2022-12-21 LAB — GLUCOSE SERPL-MCNC: 100 MG/DL (ref 65–140)

## 2022-12-22 ENCOUNTER — HOSPITAL ENCOUNTER (OUTPATIENT)
Facility: HOSPITAL | Age: 22
Discharge: HOME | End: 2022-12-22
Attending: OBSTETRICS & GYNECOLOGY | Admitting: OBSTETRICS & GYNECOLOGY

## 2022-12-22 VITALS
RESPIRATION RATE: 18 BRPM | BODY MASS INDEX: 25.61 KG/M2 | HEIGHT: 64 IN | WEIGHT: 150 LBS | SYSTOLIC BLOOD PRESSURE: 110 MMHG | TEMPERATURE: 98.1 F | DIASTOLIC BLOOD PRESSURE: 62 MMHG

## 2022-12-22 DIAGNOSIS — Z3A.38 38 WEEKS GESTATION OF PREGNANCY: Primary | ICD-10-CM

## 2022-12-22 RX ORDER — ONDANSETRON 4 MG/1
4 TABLET, FILM COATED ORAL EVERY 8 HOURS PRN
Qty: 20 TABLET | Refills: 0 | Status: SHIPPED | OUTPATIENT
Start: 2022-12-22

## 2022-12-22 NOTE — PROGRESS NOTES
Triage Note - OB  Akil Godoy 25 y o  female MRN: [id number]  Unit/Bed#: LD TRIAGE 2 Encounter: [id number]    Chief Complaint: contractions MAGY: Estimated Date of Delivery: 23    HPI: Patient is a  at 38w4d here complaining of q 2 min contractions  Patient also had n/v feeling hot and nose bleed for 2-3 min earlier  Patient reports no leaking of fluid, no bleeding, baby moving  Reviewed with patient and family that still a little early to induce without medical reason, can set up from office  Reviewed latent labor and other possible contributing factors of contractions  Patient and family report no further questions at this time  Patient would like 2 hour recheck  Vitals: /62   Temp 98 1 °F (36 7 °C) (Oral)   Resp 18   Ht 5' 4" (1 626 m)   Wt 68 kg (150 lb)   LMP 2022   BMI 25 75 kg/m²     Body mass index is 25 75 kg/m²  Physical Exam  General: Alert, no acute distress  Heart: RRR  Lungs: CTAB, no respiratory distress  Abdomen: soft, gravid, non-tender to palpation  SVE: 2/-1 soft post (unchanged from yesterday)    FHR: 130's moderate variability with accelerations and no decelerations  [place]: irritability with q 10 min contractions    Labs:   Recent Results (from the past 24 hour(s))   Fingerstick Glucose (POCT)    Collection Time: 22 10:22 PM   Result Value Ref Range    POC Glucose 100 65 - 140 mg/dl     Lab, Imaging and other studies: I have personally reviewed pertinent reports  A/P:   1) r/o labor  - SVE: 270/-1 -> 2 hr recheck unchanged  - N/V, and epistaxis have since resolved  Patient requesting medication for nausea to take at home  Zofran prescription was sent to her pharmacy by Dr Shraddha Sky  - She has other complaints at this time  Vital signs stable  -Reactive fetal heart tracing, reports good fetal movement  - Discharge instructions given to patient and labor precautions reviewed    Brenden Pereyra has an appointment scheduled on 22 on 0915 hrs  - D/w Dr Doyle Malone,  who also evaluated pt in triage     - Reassured with no cervical change from prior exams, stable for discharge from triage at this time    Heraclio Bains DO  12/22/2022  7:03 PM

## 2022-12-22 NOTE — PROGRESS NOTES
L&D Triage Note - OB/GYN  Thaddeus Roberts 25 y o  female MRN: [id number]  Unit/Bed#: LD TRIAGE  Encounter: [id number]      ASSESSMENT:    Thaddeus Roberts is a 25 y o   at 38w3d presenting for decreased fetal movement and contractions  She reported improved fetal movement while in triage and good fetal movement appreciated on transabdominal ultrasound  Her contractions spaced out while she was in triage and the cervical exam remained unchanged  PLAN:    1) r/o labor  - SVE: 50/-3 -> 2 hr recheck unchanged  -Alondra every 2 to 3 minutes on tocometer on initial presentation, after p o  hydration in triage and rest contractions spaced out to 7 to 10 minutes  She notes that her contraction discomfort has improved while in triage  -Return precautions given  Encouraged p o  hydration and rest     2) Decreased fetal movement  - Reports improved movement earlier while in triage  Has not been performing kick counts  Good fetal movement noted on transabdominal ultrasound    -Fetal heart tracing reactive in triage, baseline rate 135 bpm, moderate variability, 15 x 15 accelerations present, no decelerations  - BRAYAN: 7 2 cm  Vertex presentation    Discharge Instructions:   Continue routine prenatal care  Discharge from [facility] triage with term labor precautions    - Reviewed rupture of membranes, false vs true labor, decreased fetal movement, and vaginal bleeding   - Pt to call provider with any concerns and follow up at her next scheduled prenatal appointment on 22 at 0915  She can schedule her 39-week induction at her next appointment on Friday    -Patient encouraged to check blood sugars and contact the office with the results given her GDM diagnosis  - Case discussed with Dr Real Resides:    Thaddeus Roberts 25 y o  [place] at 38w3d with an Estimated Date of Delivery: 23     Yvonne Sanderson presented to triage this evening for frequent contractions "every minute" since earlier today  Additionally, she reports decreased fetal movement throughout the day today  She reports that that she did not complete her fetal kick counts at home, but just noticed generally less movement than her baseline   Denies any leakage of fluid or vaginal bleeding  Denies fever, chills, headache, vision changes, chest pain, shortness of breath, nausea, vomiting, diarrhea, constipation, or any other complaints at this time      Her current obstetrical history is significant for A1GDM, sickle cell carrier    Her past obstetrical history is significant for AB 2021 (Blighted ovum)    OBJECTIVE:    Vitals:    12/21/22 1852   BP: 111/62   Pulse: 96   Resp: 18   Temp: 98 2 °F (36 8 °C)   SpO2: 100%       General Physical Exam:  General: in no apparent distress, non-toxic and normal vitals  Cardiovascular: Cor RRR  Lungs: non-labored breathing  Abdomen: abdomen is soft without significant tenderness, masses, organomegaly or guarding  Lower extremeties: nontender    Cervical Exam  SVE: 2 / 70% / -3    Fetal monitoring:  FHT:  130 bpm/ Moderate 6 - 25 bpm / 15 x 15 accelerations present, no decelerations  [place]: contractions q2-4 min         Abd  US   BRAYAN      - Q1 2 22cm     - Q2 1 46cm     - Q3 1 71cm     - Q4 2 36cm     - Total: 7 06cm   Placenta: Posterior   Presentation: [address], DO,  OBGYN PGY-1  12/21/2022 9:04 PM

## 2022-12-23 ENCOUNTER — ROUTINE PRENATAL (OUTPATIENT)
Dept: OBGYN CLINIC | Facility: CLINIC | Age: 22
End: 2022-12-23

## 2022-12-23 VITALS — DIASTOLIC BLOOD PRESSURE: 70 MMHG | SYSTOLIC BLOOD PRESSURE: 120 MMHG | WEIGHT: 145 LBS | BODY MASS INDEX: 24.89 KG/M2

## 2022-12-23 DIAGNOSIS — Z34.93 PRENATAL CARE IN THIRD TRIMESTER: Primary | ICD-10-CM

## 2022-12-23 NOTE — PROGRESS NOTES
Patient reports good fm, no n/v, headache, cramping, bleeding, loss of fluid, edema, dom violence, or smoking  alix pnv patient has been to L&D every other day for contractions reports they have decreased again today more at night  Was 2 cm at L&D but unchanged  Reviewed with patient induction of labor after 39 weeks for GDM we are in process of scheduling him patient will wait for date before going given instructions did not check glucose this morning yet advised that she should eat breakfast and then arrived on labor and delivery day of induction  Discussed oxytocin and AROM and epidural as desired    Return postpartum

## 2022-12-23 NOTE — DISCHARGE INSTRUCTIONS
Induction of Labor   AMBULATORY CARE:   Induction of labor  is a procedure to induce (start) your labor before it begins on its own  Medicines and other methods are used to start contractions and help your cervix soften, thin (efface), and dilate (open)  You may be given antibiotics to fight a bacterial infection you have or prevent an infection during delivery  Reasons you may need induction of labor:   A health problem you have, such as high blood pressure or diabetes    A health problem your baby has, such as a slow heartbeat or poor growth inside the womb     Problems related to your pregnancy, such as infection of the amniotic fluid, your water breaks before labor begins, or you have too little amniotic fluid    What happens during induction of labor: Your healthcare provider may use one or more of the following to induce labor:  Cervical ripening  is a process that helps to soften and thin out your cervix  Medicines called prostaglandins may be used to ripen your cervix  These medicines can be inserted into your vagina or taken as a pill  Other methods can also be used to dilate the cervix  This includes a catheter with an inflatable balloon on the end that is inserted into your cervix  Saline injected through the catheter helps the balloon to expand  A substance that absorbs water may also be inserted into your cervix to help dilate it  Stripping the membranes  is a procedure that causes your body to release prostaglandins naturally  Prostaglandins soften the cervix and may help to cause contractions  Your healthcare provider will sweep a gloved finger over the membranes that connect the amniotic sac to the uterus wall  Rupturing the amniotic sac  is a procedure that is used to cause your water to break  Your healthcare provider will use a small tool to make a hole in your amniotic sac  This may help contractions to start       Oxytocin  may be given through an IV to cause contractions to start and stay strong and regular  Risks of induction of labor:  Medicines used to induce labor may cause too many contractions  This can lower your baby's heartbeat and decrease his or her oxygen supply  Induction of labor also increases the risk of umbilical cord prolapse  This condition causes the umbilical cord to slip back into the vagina before delivery  It can compress the cord and decrease your baby's oxygen supply  Medical induction may cause an infection in you or your baby  Medical induction may also increase your risk for a  section (), especially if it is the first time you give birth  Your uterus may rupture if you have had a  before  ©  [employer]  Information is for End User's use only and may not be sold, redistributed or otherwise used for commercial purposes  All illustrations and images included in CareNotes® are the copyrighted property of [employer]  or Ph[employer] Mcke[employer]  The above information is an  only  It is not intended as medical advice for individual conditions or treatments  Talk to your doctor, nurse or pharmacist before following any medical regimen to see if it is safe and effective for you

## 2022-12-24 ENCOUNTER — ANESTHESIA EVENT (INPATIENT)
Dept: ANESTHESIOLOGY | Facility: HOSPITAL | Age: 22
End: 2022-12-24

## 2022-12-24 ENCOUNTER — ANESTHESIA (INPATIENT)
Dept: ANESTHESIOLOGY | Facility: HOSPITAL | Age: 22
End: 2022-12-24

## 2022-12-24 ENCOUNTER — HOSPITAL ENCOUNTER (INPATIENT)
Facility: HOSPITAL | Age: 22
LOS: 2 days | Discharge: HOME | End: 2022-12-26
Attending: STUDENT IN AN ORGANIZED HEALTH CARE EDUCATION/TRAINING PROGRAM | Admitting: STUDENT IN AN ORGANIZED HEALTH CARE EDUCATION/TRAINING PROGRAM

## 2022-12-24 DIAGNOSIS — Z3A.38 38 WEEKS GESTATION OF PREGNANCY: ICD-10-CM

## 2022-12-24 PROBLEM — B37.31 YEAST VAGINITIS: Status: ACTIVE | Noted: 2022-12-24

## 2022-12-24 LAB
ABO GROUP BLD: NORMAL
ALBUMIN SERPL BCP-MCNC: 4 G/DL (ref 3.5–5)
ALP SERPL-CCNC: 236 U/L (ref 34–104)
ALT SERPL W P-5'-P-CCNC: 11 U/L (ref 7–52)
ANION GAP SERPL CALCULATED.3IONS-SCNC: 7 MMOL/L (ref 4–13)
AST SERPL W P-5'-P-CCNC: 15 U/L (ref 13–39)
BASE EXCESS BLDCOA CALC-SCNC: -6.5 MMOL/L (ref 3–11)
BASE EXCESS BLDCOV CALC-SCNC: -5.2 MMOL/L (ref 1–9)
BILIRUB SERPL-MCNC: 0.42 MG/DL (ref 0.2–1)
BLD GP AB SCN SERPL QL: NEGATIVE
BUN SERPL-MCNC: 5 MG/DL (ref 5–25)
CALCIUM SERPL-MCNC: 9.6 MG/DL (ref 8.4–10.2)
CHLORIDE SERPL-SCNC: 108 MMOL/L (ref 96–108)
CO2 SERPL-SCNC: 23 MMOL/L (ref 21–32)
CREAT SERPL-MCNC: 0.62 MG/DL (ref 0.6–1.3)
ERYTHROCYTE [DISTWIDTH] IN BLOOD BY AUTOMATED COUNT: 17.7 % (ref 11.6–15.1)
GFR SERPL CREATININE-BSD FRML MDRD: 128 ML/MIN/1.73SQ M
GLUCOSE SERPL-MCNC: 74 MG/DL (ref 65–140)
GLUCOSE SERPL-MCNC: 77 MG/DL (ref 65–140)
GLUCOSE SERPL-MCNC: 77 MG/DL (ref 65–140)
GLUCOSE SERPL-MCNC: 80 MG/DL (ref 65–140)
HCO3 BLDCOA-SCNC: 23.1 MMOL/L (ref 17.3–27.3)
HCO3 BLDCOV-SCNC: 22.7 MMOL/L (ref 12.2–28.6)
HCT VFR BLD AUTO: 42.2 % (ref 34.8–46.1)
HGB BLD-MCNC: 13 G/DL (ref 11.5–15.4)
MCH RBC QN AUTO: 24.3 PG (ref 26.8–34.3)
MCHC RBC AUTO-ENTMCNC: 30.8 G/DL (ref 31.4–37.4)
MCV RBC AUTO: 79 FL (ref 82–98)
OXYHGB MFR BLDCOA: 9.3 %
OXYHGB MFR BLDCOV: 11 %
PCO2 BLDCOA: 63.3 MM[HG] (ref 30–60)
PCO2 BLDCOV: 52.5 MM HG (ref 27–43)
PH BLDCOA: 7.18 [PH] (ref 7.23–7.43)
PH BLDCOV: 7.25 [PH] (ref 7.19–7.49)
PLATELET # BLD AUTO: 438 THOUSANDS/UL (ref 149–390)
PMV BLD AUTO: 9.6 FL (ref 8.9–12.7)
PO2 BLDCOA: <10 MM HG (ref 5–25)
PO2 BLDCOV: <10 MM HG (ref 15–45)
POTASSIUM SERPL-SCNC: 3.8 MMOL/L (ref 3.5–5.3)
PROT SERPL-MCNC: 7.7 G/DL (ref 6.4–8.4)
RBC # BLD AUTO: 5.34 MILLION/UL (ref 3.81–5.12)
RH BLD: POSITIVE
SODIUM SERPL-SCNC: 138 MMOL/L (ref 135–147)
SPECIMEN EXPIRATION DATE: NORMAL
WBC # BLD AUTO: 13.33 THOUSAND/UL (ref 4.31–10.16)

## 2022-12-24 PROCEDURE — 0UQMXZZ REPAIR VULVA, EXTERNAL APPROACH: ICD-10-PCS | Performed by: STUDENT IN AN ORGANIZED HEALTH CARE EDUCATION/TRAINING PROGRAM

## 2022-12-24 PROCEDURE — 0KQM0ZZ REPAIR PERINEUM MUSCLE, OPEN APPROACH: ICD-10-PCS | Performed by: STUDENT IN AN ORGANIZED HEALTH CARE EDUCATION/TRAINING PROGRAM

## 2022-12-24 RX ORDER — SODIUM CHLORIDE, SODIUM LACTATE, POTASSIUM CHLORIDE, CALCIUM CHLORIDE 600; 310; 30; 20 MG/100ML; MG/100ML; MG/100ML; MG/100ML
125 INJECTION, SOLUTION INTRAVENOUS CONTINUOUS
Status: DISCONTINUED | OUTPATIENT
Start: 2022-12-24 | End: 2022-12-24

## 2022-12-24 RX ORDER — ACETAMINOPHEN 325 MG/1
650 TABLET ORAL EVERY 4 HOURS PRN
Status: DISCONTINUED | OUTPATIENT
Start: 2022-12-24 | End: 2022-12-26 | Stop reason: HOSPADM

## 2022-12-24 RX ORDER — DIPHENHYDRAMINE HYDROCHLORIDE 50 MG/ML
25 INJECTION INTRAMUSCULAR; INTRAVENOUS EVERY 6 HOURS PRN
Status: DISCONTINUED | OUTPATIENT
Start: 2022-12-24 | End: 2022-12-26 | Stop reason: HOSPADM

## 2022-12-24 RX ORDER — IBUPROFEN 600 MG/1
600 TABLET ORAL EVERY 6 HOURS PRN
Status: DISCONTINUED | OUTPATIENT
Start: 2022-12-24 | End: 2022-12-26 | Stop reason: HOSPADM

## 2022-12-24 RX ORDER — DIAPER,BRIEF,INFANT-TODD,DISP
1 EACH MISCELLANEOUS DAILY PRN
Status: DISCONTINUED | OUTPATIENT
Start: 2022-12-24 | End: 2022-12-26 | Stop reason: HOSPADM

## 2022-12-24 RX ORDER — ALBUTEROL SULFATE 90 UG/1
2 AEROSOL, METERED RESPIRATORY (INHALATION) EVERY 6 HOURS PRN
Status: DISCONTINUED | OUTPATIENT
Start: 2022-12-24 | End: 2022-12-24

## 2022-12-24 RX ORDER — ONDANSETRON 2 MG/ML
4 INJECTION INTRAMUSCULAR; INTRAVENOUS EVERY 8 HOURS PRN
Status: DISCONTINUED | OUTPATIENT
Start: 2022-12-24 | End: 2022-12-26 | Stop reason: HOSPADM

## 2022-12-24 RX ORDER — SIMETHICONE 80 MG
80 TABLET,CHEWABLE ORAL 4 TIMES DAILY PRN
Status: DISCONTINUED | OUTPATIENT
Start: 2022-12-24 | End: 2022-12-26 | Stop reason: HOSPADM

## 2022-12-24 RX ORDER — DOCUSATE SODIUM 100 MG/1
100 CAPSULE, LIQUID FILLED ORAL 2 TIMES DAILY
Status: DISCONTINUED | OUTPATIENT
Start: 2022-12-24 | End: 2022-12-26 | Stop reason: HOSPADM

## 2022-12-24 RX ORDER — FLUCONAZOLE 100 MG/1
200 TABLET ORAL DAILY
Status: DISCONTINUED | OUTPATIENT
Start: 2022-12-24 | End: 2022-12-24

## 2022-12-24 RX ORDER — ONDANSETRON 2 MG/ML
4 INJECTION INTRAMUSCULAR; INTRAVENOUS EVERY 4 HOURS PRN
Status: DISCONTINUED | OUTPATIENT
Start: 2022-12-24 | End: 2022-12-24

## 2022-12-24 RX ORDER — CALCIUM CARBONATE 200(500)MG
1000 TABLET,CHEWABLE ORAL DAILY PRN
Status: DISCONTINUED | OUTPATIENT
Start: 2022-12-24 | End: 2022-12-26 | Stop reason: HOSPADM

## 2022-12-24 RX ORDER — SENNOSIDES 8.6 MG
1 TABLET ORAL DAILY
Status: DISCONTINUED | OUTPATIENT
Start: 2022-12-25 | End: 2022-12-26 | Stop reason: HOSPADM

## 2022-12-24 RX ORDER — BUPIVACAINE HYDROCHLORIDE 2.5 MG/ML
INJECTION, SOLUTION EPIDURAL; INFILTRATION; INTRACAUDAL AS NEEDED
Status: DISCONTINUED | OUTPATIENT
Start: 2022-12-24 | End: 2022-12-29 | Stop reason: HOSPADM

## 2022-12-24 RX ORDER — LIDOCAINE HYDROCHLORIDE AND EPINEPHRINE 15; 5 MG/ML; UG/ML
INJECTION, SOLUTION EPIDURAL AS NEEDED
Status: DISCONTINUED | OUTPATIENT
Start: 2022-12-24 | End: 2022-12-29 | Stop reason: HOSPADM

## 2022-12-24 RX ORDER — OXYTOCIN/RINGER'S LACTATE 30/500 ML
PLASTIC BAG, INJECTION (ML) INTRAVENOUS
Status: COMPLETED
Start: 2022-12-24 | End: 2022-12-24

## 2022-12-24 RX ORDER — ONDANSETRON 2 MG/ML
4 INJECTION INTRAMUSCULAR; INTRAVENOUS ONCE
Status: COMPLETED | OUTPATIENT
Start: 2022-12-24 | End: 2022-12-24

## 2022-12-24 RX ORDER — OXYTOCIN/RINGER'S LACTATE 30/500 ML
250 PLASTIC BAG, INJECTION (ML) INTRAVENOUS ONCE
Status: COMPLETED | OUTPATIENT
Start: 2022-12-24 | End: 2022-12-24

## 2022-12-24 RX ADMIN — ACETAMINOPHEN 650 MG: 325 TABLET, FILM COATED ORAL at 22:01

## 2022-12-24 RX ADMIN — FLUCONAZOLE 200 MG: 100 TABLET ORAL at 16:09

## 2022-12-24 RX ADMIN — Medication 250 MILLI-UNITS/MIN: at 20:45

## 2022-12-24 RX ADMIN — DOCUSATE SODIUM 100 MG: 100 CAPSULE, LIQUID FILLED ORAL at 22:01

## 2022-12-24 RX ADMIN — SODIUM CHLORIDE, SODIUM LACTATE, POTASSIUM CHLORIDE, AND CALCIUM CHLORIDE 125 ML/HR: .6; .31; .03; .02 INJECTION, SOLUTION INTRAVENOUS at 14:10

## 2022-12-24 RX ADMIN — BUPIVACAINE HYDROCHLORIDE 5 ML: 2.5 INJECTION, SOLUTION EPIDURAL; INFILTRATION; INTRACAUDAL at 15:13

## 2022-12-24 RX ADMIN — Medication: at 15:30

## 2022-12-24 RX ADMIN — ONDANSETRON 4 MG: 2 INJECTION INTRAMUSCULAR; INTRAVENOUS at 20:25

## 2022-12-24 RX ADMIN — SODIUM CHLORIDE, SODIUM LACTATE, POTASSIUM CHLORIDE, AND CALCIUM CHLORIDE 125 ML/HR: .6; .31; .03; .02 INJECTION, SOLUTION INTRAVENOUS at 15:35

## 2022-12-24 RX ADMIN — LIDOCAINE HYDROCHLORIDE AND EPINEPHRINE 5 ML: 15; 5 INJECTION, SOLUTION EPIDURAL at 15:13

## 2022-12-24 RX ADMIN — ONDANSETRON 4 MG: 2 INJECTION INTRAMUSCULAR; INTRAVENOUS at 16:15

## 2022-12-24 NOTE — OB LABOR/OXYTOCIN SAFETY PROGRESS
Labor Progress Note - Case Kasper 25 y o  female MRN: [id number]    Unit/Bed#: -01 Encounter: [id number]       Contraction Frequency (minutes): irregular, 3-5  Contraction Quality: Moderate  Tachysystole: No   Cervical Dilation: 5        Cervical Effacement: 80  Fetal Station: -2  Baseline Rate: 130 bpm  Fetal Heart Rate: 134 BPM  FHR Category: Category I               Vital Signs:   Vitals:    12/24/22 1520   BP: 128/72   Pulse: 96   Resp:    Temp:    SpO2:        Notes/comments: SVE 5/80/-2, FHT Cat I, toco q2  Discussed with attending          Enrico Moran MD 12/24/2022 3:38 PM

## 2022-12-24 NOTE — PLAN OF CARE
Problem: PAIN - ADULT  Goal: Verbalizes/displays adequate comfort level or baseline comfort level  Description: Interventions:  - Encourage patient to monitor pain and request assistance  - Assess pain using appropriate pain scale  - Administer analgesics based on type and severity of pain and evaluate response  - Implement non-pharmacological measures as appropriate and evaluate response  - Consider cultural and social influences on pain and pain management  - Notify physician/advanced practitioner if interventions unsuccessful or patient reports new pain  Outcome: Progressing     Problem: INFECTION - ADULT  Goal: Absence or prevention of progression during hospitalization  Description: INTERVENTIONS:  - Assess and monitor for signs and symptoms of infection  - Monitor lab/diagnostic results  - Monitor all insertion sites, i e  indwelling lines, tubes, and drains  - Monitor endotracheal if appropriate and nasal secretions for changes in amount and color  - [place] appropriate cooling/warming therapies per order  - Administer medications as ordered  - Instruct and encourage patient and family to use good hand hygiene technique  - Identify and instruct in appropriate isolation precautions for identified infection/condition  Outcome: Progressing  Goal: Absence of fever/infection during neutropenic period  Description: INTERVENTIONS:  - Monitor WBC    Outcome: Progressing     Problem: SAFETY ADULT  Goal: Patient will remain free of falls  Description: INTERVENTIONS:  - Educate patient/family on patient safety including physical limitations  - Instruct patient to call for assistance with activity   - Consult OT/PT to assist with strengthening/mobility   - Keep Call bell within reach  - Keep bed low and locked with side rails adjusted as appropriate  - Keep care items and personal belongings within reach  - Initiate and maintain comfort rounds  - Make Fall Risk Sign visible to staff  - Apply yellow socks and bracelet for high fall risk patients  - Consider moving patient to room near nurses station  Outcome: Progressing  Goal: Maintain or return to baseline ADL function  Description: INTERVENTIONS:  -  Assess patient's ability to carry out ADLs; assess patient's baseline for ADL function and identify physical deficits which impact ability to perform ADLs (bathing, care of mouth/teeth, toileting, grooming, dressing, etc )  - Assess/evaluate cause of self-care deficits   - Assess range of motion  - Assess patient's mobility; develop plan if impaired  - Assess patient's need for assistive devices and provide as appropriate  - Encourage maximum independence but intervene and supervise when necessary  - Involve family in performance of ADLs  - Assess for home care needs following discharge   - Consider OT consult to assist with ADL evaluation and planning for discharge  - Provide patient education as appropriate  Outcome: Progressing  Goal: Maintains/Returns to pre admission functional level  Description: INTERVENTIONS:  - Perform BMAT or MOVE assessment daily    - Set and communicate daily mobility goal to care team and patient/family/caregiver  - Collaborate with rehabilitation services on mobility goals if consulted  - Out of bed for toileting  - Record patient progress and toleration of activity level   Outcome: Progressing     Problem: Knowledge Deficit  Goal: Patient/family/caregiver demonstrates understanding of disease process, treatment plan, medications, and discharge instructions  Description: Complete learning assessment and assess knowledge base    Interventions:  - Provide teaching at level of understanding  - Provide teaching via preferred learning methods  Outcome: Progressing     Problem: DISCHARGE PLANNING  Goal: Discharge to home or other facility with appropriate resources  Description: INTERVENTIONS:  - Identify barriers to discharge w/patient and caregiver  - Arrange for needed discharge resources and transportation as appropriate  - Identify discharge learning needs (meds, wound care, etc )  - Arrange for interpretive services to assist at discharge as needed  - Refer to Case Management Department for coordinating discharge planning if the patient needs post-hospital services based on physician/advanced practitioner order or complex needs related to functional status, cognitive ability, or social support system  Outcome: Progressing     Problem: BIRTH - VAGINAL/ SECTION  Goal: Fetal and maternal status remain reassuring during the birth process  Description: INTERVENTIONS:  - Monitor vital signs  - Monitor fetal heart rate  - Monitor uterine activity  - Monitor labor progression (vaginal delivery)  - DVT prophylaxis  - Antibiotic prophylaxis  Outcome: Progressing  Goal: Emotionally satisfying birthing experience for mother/fetus  Description: Interventions:  - Assess, plan, implement and evaluate the nursing care given to the patient in labor  - Advocate the philosophy that each childbirth experience is a unique experience and support the family's chosen level of involvement and control during the labor process   - Actively participate in both the patient's and family's teaching of the birth process  - Consider cultural, Restorationism and age-specific factors and plan care for the patient in labor  Outcome: Progressing

## 2022-12-24 NOTE — ASSESSMENT & PLAN NOTE
FOB Anthony Darlene status unknown  Patient has sister with disease  Aware of screening recommendation for partner and diagnostic testing options

## 2022-12-24 NOTE — ANESTHESIA PROCEDURE NOTES
Epidural Block    Reason for block: procedure for pain and at surgeon's request  Preanesthetic Checklist  Completed: patient identified, IV checked, site marked, risks and benefits discussed, surgical consent, monitors and equipment checked, pre-op evaluation and timeout performed  Epidural  Patient position: sitting  Prep: ChloraPrep  Patient monitoring: cardiac monitor and frequent blood pressure checks  Approach: midline  Location: lumbar  Injection technique: ALISA air  Needle  Needle type: Tuohy   Needle gauge: 18 G  Catheter type: side hole  Catheter size: 20 G  Test dose: negative  Assessment  Number of attempts: 1negative aspiration for CSF, negative aspiration for heme and no paresthesia on injection  patient tolerated the procedure well with no immediate complications

## 2022-12-24 NOTE — OB LABOR/OXYTOCIN SAFETY PROGRESS
Labor Progress Note - Monique Allenodore 25 y o  female MRN: [id number]    Unit/Bed#: -01 Encounter: [id number]       Contraction Frequency (minutes): 2-3  Contraction Quality: Moderate  Tachysystole: No   Cervical Dilation: 5        Cervical Effacement: 90  Fetal Station: -2  Baseline Rate: 140 bpm  Fetal Heart Rate: 145 BPM  FHR Category: Category I               Vital Signs:   Vitals:    12/24/22 1819   BP: 129/71   Pulse: (!) 108   Resp:    Temp:    SpO2:        Notes/comments: FHT Cat I, toco q2  Discussed with attending          [employer], MD 12/24/2022 6:21 PM

## 2022-12-24 NOTE — ANESTHESIA PREPROCEDURE EVALUATION
Procedure:  LABOR ANALGESIA    Relevant Problems   GYN   (+) 38 weeks gestation of pregnancy   (+) Pregnancy      HEMATOLOGY   (+) Anemia in pregnancy, second trimester        Physical Exam    Airway    Mallampati score: I  TM Distance: >3 FB  Neck ROM: full     Dental       Cardiovascular  Cardiovascular exam normal    Pulmonary  Pulmonary exam normal     Other Findings        Anesthesia Plan  ASA Score- 2     Anesthesia Type- epidural with ASA Monitors  Additional Monitors:   Airway Plan:           Plan Factors-Exercise tolerance (METS): >4 METS  Chart reviewed  EKG reviewed  Imaging results reviewed  Existing labs reviewed  Patient summary reviewed  Induction-     Postoperative Plan-     Informed Consent- Anesthetic plan and risks discussed with patient  I personally reviewed this patient with the CRNA  Discussed and agreed on the Anesthesia Plan with the CRNA  Adriana Ho

## 2022-12-24 NOTE — H&P
H & P- Obstetrics   Glo Healy 25 y o  female MRN: [id number]  Unit/Bed#: -01 Encounter: [id number]    Assessment: 25 y o   at 38w6d admitted for labor   SVE: /-2  FHT: Cat I  Clinical EFW: 7 ; Cephalic confirmed by ultrasound  GBS status: negative   Postpartum contraception plan: nexplanon    Plan:   Yeast vaginitis  Assessment & Plan  S/p diflucan    Diet controlled gestational diabetes mellitus (GDM) in third trimester  Assessment & Plan  No results found for: HGBA1C    Recent Labs     22  2222   POCGLU 100       Blood Sugar Average: Last 72 hrs:      Sickle cell trait in mother affecting pregnancy ([place] [id number] )  Assessment & Plan  FOB Daya Peralta status unknown  Patient has sister with disease  Aware of screening recommendation for partner and diagnostic testing options      *  (spontaneous vaginal delivery)  Assessment & Plan  Admit   T&S, CBC, RPR  CLD  IV fluids  GBS prophylaxis is not needed  Expectant management        Discussed case and plan w/ Dr Shane Law      Chief Complaint: contractions    HPI: Glo Healy is a 25 y o  [place] with an MAGY of 2023, by Last Menstrual Period at 38w6d who is being admitted for labor   She complains of uterine contractions, occurring every few minutes, has moderate LOF, and reports no VB  She states she has felt good FM  Patient Active Problem List   Diagnosis   • Sickle cell trait in mother affecting pregnancy ([place] [address] )   • Pregnancy   • Anemia in pregnancy, second trimester   • Diet controlled gestational diabetes mellitus (GDM) in third trimester   •  (spontaneous vaginal delivery)   • Yeast vaginitis       Baby complications/comments: none    Review of Systems   Constitutional: Negative for chills and fever  Respiratory: Negative for cough and shortness of breath  Cardiovascular: Negative for chest pain and leg swelling  Gastrointestinal: Negative for abdominal pain, nausea and vomiting     Genitourinary: Negative for dysuria, pelvic pain, urgency, vaginal bleeding and vaginal discharge  Neurological: Negative for dizziness, light-headedness and headaches  All other systems reviewed and are negative  OB Hx:  OB History    Para Term  AB Living   2 0 0 0 1     SAB IAB Ectopic Multiple Live Births   0 0 0          # Outcome Date GA Lbr Lenny/2nd Weight Sex Delivery Anes PTL Lv   2 Current            1 AB 2021              Complications: Blighted ovum       Past Medical Hx:  Past Medical History:   Diagnosis Date   • Anemia    • Asthma    • Gestational diabetes     diet controlled   • Seasonal allergies    • Varicella        Past Surgical hx:  No past surgical history on file  Social Hx:  Alcohol use: no  Tobacco use: no  Other substance use: no    Other: no    Allergies   Allergen Reactions   • Mixed Ragweed Nasal Congestion   • Pollen Extract Nasal Congestion   • Pollen Extract Sneezing and Eye Swelling       Medications Prior to Admission   Medication   • albuterol (Proventil HFA) 90 mcg/act inhaler   • ondansetron (ZOFRAN) 4 mg tablet   • Prenatal MV-Min-Fe Fum-FA-DHA (Prenatal+DHA) 28-0 975 & 200 MG MISC   • acetaminophen (TYLENOL) 325 mg tablet   • Blood Glucose Monitoring Suppl (OneTouch Verio Flex System) w/Device KIT   • OneTouch Delica Lancets 44Z MISC   • OneTouch Verio test strip       Objective:  Temp:  [97 7 °F (36 5 °C)-100 6 °F (38 1 °C)] 99 3 °F (37 4 °C)  HR:  [] 95  Resp:  [18-20] 20  BP: ()/(47-78) 124/60  Body mass index is 25 27 kg/m²  Physical Exam:  Physical Exam  Constitutional:       Appearance: Normal appearance  Cardiovascular:      Rate and Rhythm: Normal rate and regular rhythm  Heart sounds: No murmur heard  No friction rub  No gallop  Pulmonary:      Effort: Pulmonary effort is normal  No respiratory distress  Breath sounds: No wheezing  Abdominal:      Palpations: Abdomen is soft  Tenderness: There is no abdominal tenderness  Musculoskeletal:         General: No swelling or tenderness  Neurological:      Mental Status: She is alert and oriented to person, place, and time  Skin:     General: Skin is warm and dry  Psychiatric:         Mood and Affect: Mood normal    Vitals reviewed  FHT:  Baseline Rate: 150 bpm  Variability: Moderate 6-25 bpm  Accelerations: At variable times, 15 x 15 or greater  Decelerations: Variable  FHR Category: Category II    TOCO:   Contraction Frequency (minutes): 2-3  Contraction Duration (seconds): 40-70  Contraction Quality: Strong    Lab Results   Component Value Date    WBC 13 33 (H) 12/24/2022    HGB 13 0 12/24/2022    HCT 42 2 12/24/2022     (H) 12/24/2022     Lab Results   Component Value Date    K 3 8 12/24/2022     12/24/2022    CO2 23 12/24/2022    BUN 5 12/24/2022    CREATININE 0 62 12/24/2022    GLUCOSE 187 (H) 10/19/2022    GLUCOSE 165 (H) 10/19/2022    AST 15 12/24/2022    ALT 11 12/24/2022     Prenatal Labs: Reviewed      Blood type: A Pos  Antibody: Neg  GBS: Neg  HIV: Nonreactive  Rubella: Immune  VDRL/RPR: Non reactive  HBsAg: Negative  Chlamydia: Negative  Gonorrhea: Negative  Diabetes 1 hour screen: 177  3 hour glucose: 165  Platelets: 182  Hgb: 10 4  >2 Midnights  INPATIENT     Signature/Title:  Sosa Alfredo MD  Date: 12/25/2022  Time: 12:37 AM

## 2022-12-24 NOTE — ASSESSMENT & PLAN NOTE
Lochia WNL   Recovering well   Appropriate bowel and bladder function   Pain well controlled   Tolerating diet   Breastfeeding   Ambulating without issues   No lower extremity tenderness  GBS neg   Rh positive   POP bridge (in discharge orders) to nexplanon

## 2022-12-24 NOTE — ASSESSMENT & PLAN NOTE
No results found for: HGBA1C    Recent Labs     12/21/22  2222   POCGLU 100       Blood Sugar Average: Last 72 hrs:

## 2022-12-24 NOTE — OB LABOR/OXYTOCIN SAFETY PROGRESS
Labor Progress Note - Ashwini Collins 25 y o  female MRN: [id number]    Unit/Bed#: -01 Encounter: [id number]       Contraction Frequency (minutes): 1 5-2 5  Contraction Quality: Moderate  Tachysystole: No   Cervical Dilation: 5        Cervical Effacement: 90  Fetal Station: -2  Baseline Rate: 140 bpm  Fetal Heart Rate: 142 BPM  FHR Category: Category I               Vital Signs:   Vitals:    12/24/22 1825   BP:    Pulse:    Resp: 18   Temp: 98 1 °F (36 7 °C)   SpO2:        Notes/comments: AROM mec, FHT Cat I  Discussed with attending          Fabiola Paredes MD 12/24/2022 6:45 PM

## 2022-12-25 LAB
HBV SURFACE AG SER QL: NORMAL
RPR SER QL: NORMAL

## 2022-12-25 RX ADMIN — SENNOSIDES 8.6 MG: 8.6 TABLET, FILM COATED ORAL at 12:53

## 2022-12-25 RX ADMIN — ACETAMINOPHEN 650 MG: 325 TABLET, FILM COATED ORAL at 02:54

## 2022-12-25 RX ADMIN — IBUPROFEN 600 MG: 600 TABLET, FILM COATED ORAL at 12:53

## 2022-12-25 RX ADMIN — IBUPROFEN 600 MG: 600 TABLET, FILM COATED ORAL at 00:48

## 2022-12-25 RX ADMIN — DOCUSATE SODIUM 100 MG: 100 CAPSULE, LIQUID FILLED ORAL at 18:13

## 2022-12-25 RX ADMIN — DOCUSATE SODIUM 100 MG: 100 CAPSULE, LIQUID FILLED ORAL at 12:53

## 2022-12-25 RX ADMIN — IBUPROFEN 600 MG: 600 TABLET, FILM COATED ORAL at 18:13

## 2022-12-25 NOTE — PROGRESS NOTES
Progress Note - OB/GYN  Dylan Reeder 25 y o  female MRN: [id number]  Unit/Bed#: -01 Encounter: [id number]    Assessment and Plan     Dylan Reeder is a patient of: [facility]   She is PPD# 1 s/p  spontaneous vaginal delivery  Recovering well and is stable       Yeast vaginitis  Assessment & Plan  S/p diflucan    Sickle cell trait in mother affecting pregnancy [facility])  Assessment & Plan  FOB Niki Nagelfinkel status unknown  Patient has sister with disease  Aware of screening recommendation for partner and diagnostic testing options      *  (spontaneous vaginal delivery)  Assessment & Plan  Lochia WNL   Recovering well   Appropriate bowel and bladder function   Pain well controlled   Tolerating diet   Breastfeeding   Ambulating without issues   No lower extremity tenderness  GBS neg   Rh positive   POP bridge to nexplanon       Disposition    - Anticipate discharge home on PPD# 2      Subjective/Objective     Chief Complaint: Postpartum State     Subjective:    Dylan Reeder is PPD/POD#1 s/p  spontaneous vaginal delivery  She has no current complaints  Pain is well controlled  Patient is currently voiding  She is ambulating  Patient is currently passing flatus and has had no bowel movement  She is tolerating PO, and denies nausea or vomitting  Patient denies fever, chills, chest pain, shortness of breath, or calf tenderness  Lochia is normal  She is  Bottle feeding  She is recovering well and is stable         Vitals:   /56 (BP Location: Right arm)   Pulse 85   Temp 98 9 °F (37 2 °C) (Oral)   Resp 16   Ht 5' 4" (1 626 m)   Wt 66 8 kg (147 lb 3 2 oz)   LMP 2022   SpO2 99%   Breastfeeding Yes   BMI 25 27 kg/m²       Intake/Output Summary (Last 24 hours) at 2022 0710  Last data filed at 2022 0530  Gross per 24 hour   Intake --   Output 1419 ml   Net -1419 ml       Invasive Devices     Peripheral Intravenous Line  Duration           Peripheral IV 22 Left;Proximal;Ventral (anterior) Forearm <1 day          Epidural Line  Duration           Epidural Catheter 12/24/22 <1 day                Physical Exam:   GEN: [place] [employer] appears well, alert and oriented x 3, pleasant and cooperative   CARDIO: RRR, no murmurs or rubs  RESP:  CTAB, no wheezes or rales  ABDOMEN: soft, no tenderness, no distention, fundus @ U  EXTREMITIES: SCDs on, non tender, no erythema, b/l Fan's sign negative      Labs:     Hemoglobin   Date Value Ref Range Status   12/24/2022 13 0 11 5 - 15 4 g/dL Final   10/10/2022 10 4 (L) 11 1 - 15 9 g/dL Final   09/10/2022 9 9 (L) 11 1 - 15 9 g/dL Final   09/13/2021 10 2 (L) 11 5 - 15 4 g/dL Final     WBC   Date Value Ref Range Status   12/24/2022 13 33 (H) 4 31 - 10 16 Thousand/uL Final   09/13/2021 6 34 4 31 - 10 16 Thousand/uL Final     White Blood Cell Count   Date Value Ref Range Status   10/10/2022 6 7 3 4 - 10 8 x10E3/uL Final   09/10/2022 7 5 3 4 - 10 8 x10E3/uL Final     Platelet Count   Date Value Ref Range Status   10/10/2022 359 150 - 450 x10E3/uL Final   09/10/2022 327 150 - 450 x10E3/uL Final     Platelets   Date Value Ref Range Status   12/24/2022 438 (H) 149 - 390 Thousands/uL Final   09/13/2021 466 (H) 149 - 390 Thousands/uL Final     Creatinine   Date Value Ref Range Status   12/24/2022 0 62 0 60 - 1 30 mg/dL Final     Comment:     Standardized to IDMS reference method   09/13/2021 0 92 0 60 - 1 30 mg/dL Final     Comment:     Standardized to IDMS reference method     AST   Date Value Ref Range Status   12/24/2022 15 13 - 39 U/L Final     Comment:     Specimen collection should occur prior to Sulfasalazine administration due to the potential for falsely depressed results  09/13/2021 31 5 - 45 U/L Final     Comment:     Specimen collection should occur prior to Sulfasalazine administration due to the potential for falsely depressed results        ALT   Date Value Ref Range Status   12/24/2022 11 7 - 52 U/L Final     Comment: Specimen collection should occur prior to Sulfasalazine administration due to the potential for falsely depressed results  09/13/2021 52 12 - 78 U/L Final     Comment:     Specimen collection should occur prior to Sulfasalazine administration due to the potential for falsely depressed results             Savi Saucedo MD  12/25/2022  7:10 AM

## 2022-12-25 NOTE — OB LABOR/OXYTOCIN SAFETY PROGRESS
Labor Progress Note - Ashwini Collins 25 y o  female MRN: [id number]    Unit/Bed#: -01 Encounter: [id number]       Contraction Frequency (minutes): 1 5-3  Contraction Quality: Moderate  Tachysystole: No   Cervical Dilation: 5-6   Cervical Effacement: 90  Fetal Station: -2  Baseline Rate: 145 bpm  Fetal Heart Rate: 102 BPM  FHR Category: Category I      Vital Signs:   Vitals:    12/24/22 1904   BP: 111/62   Pulse: (!) 108   Resp:    Temp:    SpO2:        Notes/comments: patient was reexamined due to prolonged deceleration of 7-8mins with bindu of 90s following artifical amniotomy  She was repositioned to her left side and oxygen administered by face mask with resolution of deceleration  Small interval cervical change noted  Continue to manage expectantly  Will discuss with Dr Searcy Sicard Lytle Dies, MD 12/24/2022 7:21 PM

## 2022-12-25 NOTE — PLAN OF CARE
Problem: PAIN - ADULT  Goal: Verbalizes/displays adequate comfort level or baseline comfort level  Description: Interventions:  - Encourage patient to monitor pain and request assistance  - Assess pain using appropriate pain scale  - Administer analgesics based on type and severity of pain and evaluate response  - Implement non-pharmacological measures as appropriate and evaluate response  - Consider cultural and social influences on pain and pain management  - Notify physician/advanced practitioner if interventions unsuccessful or patient reports new pain  Outcome: Progressing     Problem: INFECTION - ADULT  Goal: Absence or prevention of progression during hospitalization  Description: INTERVENTIONS:  - Assess and monitor for signs and symptoms of infection  - Monitor lab/diagnostic results  - Monitor all insertion sites, i e  indwelling lines, tubes, and drains  - Monitor endotracheal if appropriate and nasal secretions for changes in amount and color  - [place] appropriate cooling/warming therapies per order  - Administer medications as ordered  - Instruct and encourage patient and family to use good hand hygiene technique  - Identify and instruct in appropriate isolation precautions for identified infection/condition  Outcome: Progressing  Goal: Absence of fever/infection during neutropenic period  Description: INTERVENTIONS:  - Monitor WBC    Outcome: Progressing     Problem: SAFETY ADULT  Goal: Patient will remain free of falls  Description: INTERVENTIONS:  - Educate patient/family on patient safety including physical limitations  - Instruct patient to call for assistance with activity   - Consult OT/PT to assist with strengthening/mobility   - Keep Call bell within reach  - Keep bed low and locked with side rails adjusted as appropriate  - Keep care items and personal belongings within reach  - Initiate and maintain comfort rounds  - Make Fall Risk Sign visible to staff    Outcome: Progressing  Goal: Maintain or return to baseline ADL function  Description: INTERVENTIONS:  -  Assess patient's ability to carry out ADLs; assess patient's baseline for ADL function and identify physical deficits which impact ability to perform ADLs (bathing, care of mouth/teeth, toileting, grooming, dressing, etc )  - Assess/evaluate cause of self-care deficits   - Assess range of motion  - Assess patient's mobility; develop plan if impaired  - Assess patient's need for assistive devices and provide as appropriate  - Encourage maximum independence but intervene and supervise when necessary  - Involve family in performance of ADLs  - Assess for home care needs following discharge   - Consider OT consult to assist with ADL evaluation and planning for discharge  - Provide patient education as appropriate  Outcome: Progressing  Goal: Maintains/Returns to pre admission functional level  Description: INTERVENTIONS:  - Perform BMAT or MOVE assessment daily    - Set and communicate daily mobility goal to care team and patient/family/caregiver  - Collaborate with rehabilitation services on mobility goals if consulted    - Out of bed for toileting  - Record patient progress and toleration of activity level   Outcome: Progressing     Problem: Knowledge Deficit  Goal: Patient/family/caregiver demonstrates understanding of disease process, treatment plan, medications, and discharge instructions  Description: Complete learning assessment and assess knowledge base    Interventions:  - Provide teaching at level of understanding  - Provide teaching via preferred learning methods  Outcome: Progressing     Problem: DISCHARGE PLANNING  Goal: Discharge to home or other facility with appropriate resources  Description: INTERVENTIONS:  - Identify barriers to discharge w/patient and caregiver  - Arrange for needed discharge resources and transportation as appropriate  - Identify discharge learning needs (meds, wound care, etc )  - Arrange for interpretive services to assist at discharge as needed  - Refer to Case Management Department for coordinating discharge planning if the patient needs post-hospital services based on physician/advanced practitioner order or complex needs related to functional status, cognitive ability, or social support system  Outcome: Progressing     Problem: POSTPARTUM  Goal: Experiences normal postpartum course  Description: INTERVENTIONS:  - Monitor maternal vital signs  - Assess uterine involution and lochia  Outcome: Progressing  Goal: Appropriate maternal -  bonding  Description: INTERVENTIONS:  - Identify family support  - Assess for appropriate maternal/infant bonding   -Encourage maternal/infant bonding opportunities  - Referral to  or  as needed  Outcome: Progressing  Goal: Establishment of infant feeding pattern  Description: INTERVENTIONS:  - Assess breast/bottle feeding  - Refer to lactation as needed  Outcome: Progressing  Goal: Incision(s), wounds(s) or drain site(s) healing without S/S of infection  Description: INTERVENTIONS  - Assess and document dressing, incision, wound bed, drain sites and surrounding tissue  - Provide patient and family education  - Perform skin care/dressing changes every day  Outcome: Progressing

## 2022-12-25 NOTE — PLAN OF CARE
Problem: PAIN - ADULT  Goal: Verbalizes/displays adequate comfort level or baseline comfort level  Description: Interventions:  - Encourage patient to monitor pain and request assistance  - Assess pain using appropriate pain scale  - Administer analgesics based on type and severity of pain and evaluate response  - Implement non-pharmacological measures as appropriate and evaluate response  - Consider cultural and social influences on pain and pain management  - Notify physician/advanced practitioner if interventions unsuccessful or patient reports new pain  12/25/2022 0004 by Guille Severino RN  Outcome: Progressing  12/25/2022 0003 by Guille Severino RN  Outcome: Progressing     Problem: INFECTION - ADULT  Goal: Absence or prevention of progression during hospitalization  Description: INTERVENTIONS:  - Assess and monitor for signs and symptoms of infection  - Monitor lab/diagnostic results  - Monitor all insertion sites, i e  indwelling lines, tubes, and drains  - Monitor endotracheal if appropriate and nasal secretions for changes in amount and color  - [place] appropriate cooling/warming therapies per order  - Administer medications as ordered  - Instruct and encourage patient and family to use good hand hygiene technique  - Identify and instruct in appropriate isolation precautions for identified infection/condition  12/25/2022 0004 by Guille Severino RN  Outcome: Progressing  12/25/2022 0003 by Guille Severino RN  Outcome: Progressing  Goal: Absence of fever/infection during neutropenic period  Description: INTERVENTIONS:  - Monitor WBC    12/25/2022 0004 by Guille Severino RN  Outcome: Progressing  12/25/2022 0003 by Guille Severino RN  Outcome: Progressing     Problem: SAFETY ADULT  Goal: Patient will remain free of falls  Description: INTERVENTIONS:  - Educate patient/family on patient safety including physical limitations  - Instruct patient to call for assistance with activity   - Consult OT/PT to assist with strengthening/mobility   - Keep Call bell within reach  - Keep bed low and locked with side rails adjusted as appropriate  - Keep care items and personal belongings within reach  - Initiate and maintain comfort rounds  - Make Fall Risk Sign visible to staff  - Offer Toileting every  Hours, in advance of need  - Initiate/Maintain alarm  - Obtain necessary fall risk management equipment:   - Apply yellow socks and bracelet for high fall risk patients  - Consider moving patient to room near nurses station  12/25/2022 0004 by Guillaume Arce RN  Outcome: Progressing  12/25/2022 0003 by Guillaume Arce RN  Outcome: Progressing  Goal: Maintain or return to baseline ADL function  Description: INTERVENTIONS:  -  Assess patient's ability to carry out ADLs; assess patient's baseline for ADL function and identify physical deficits which impact ability to perform ADLs (bathing, care of mouth/teeth, toileting, grooming, dressing, etc )  - Assess/evaluate cause of self-care deficits   - Assess range of motion  - Assess patient's mobility; develop plan if impaired  - Assess patient's need for assistive devices and provide as appropriate  - Encourage maximum independence but intervene and supervise when necessary  - Involve family in performance of ADLs  - Assess for home care needs following discharge   - Consider OT consult to assist with ADL evaluation and planning for discharge  - Provide patient education as appropriate  12/25/2022 0004 by Guillaume Arce RN  Outcome: Progressing  12/25/2022 0003 by Guillaume Arce RN  Outcome: Progressing  Goal: Maintains/Returns to pre admission functional level  Description: INTERVENTIONS:  - Perform BMAT or MOVE assessment daily    - Set and communicate daily mobility goal to care team and patient/family/caregiver  - Collaborate with rehabilitation services on mobility goals if consulted  - Perform Range of Motion  times a day  - Reposition patient every  hours    - Dangle patient times a day  - Stand patient  times a day  - Ambulate patient  times a day  - Out of bed to chair  times a day   - Out of bed for luanne times a day  - Out of bed for toileting  - Record patient progress and toleration of activity level   2022 0004 by Guille Severino RN  Outcome: Progressing  2022 by Guille Severino RN  Outcome: Progressing     Problem: Knowledge Deficit  Goal: Patient/family/caregiver demonstrates understanding of disease process, treatment plan, medications, and discharge instructions  Description: Complete learning assessment and assess knowledge base    Interventions:  - Provide teaching at level of understanding  - Provide teaching via preferred learning methods  2022 by Guille Severino RN  Outcome: Progressing  2022 by Guille Severino RN  Outcome: Progressing     Problem: DISCHARGE PLANNING  Goal: Discharge to home or other facility with appropriate resources  Description: INTERVENTIONS:  - Identify barriers to discharge w/patient and caregiver  - Arrange for needed discharge resources and transportation as appropriate  - Identify discharge learning needs (meds, wound care, etc )  - Arrange for interpretive services to assist at discharge as needed  - Refer to Case Management Department for coordinating discharge planning if the patient needs post-hospital services based on physician/advanced practitioner order or complex needs related to functional status, cognitive ability, or social support system  2022 by Guille Severino RN  Outcome: Progressing  2022 by Guille Severino RN  Outcome: Progressing     Problem: BIRTH - VAGINAL/ SECTION  Goal: Fetal and maternal status remain reassuring during the birth process  Description: INTERVENTIONS:  - Monitor vital signs  - Monitor fetal heart rate  - Monitor uterine activity  - Monitor labor progression (vaginal delivery)  - DVT prophylaxis  - Antibiotic prophylaxis  2022 0004 by Hanna Pineda Raffaele Cortez, LIZBETH  Outcome: Progressing  2022 0003 by Amish Ureña RN  Outcome: Progressing  Goal: Emotionally satisfying birthing experience for mother/fetus  Description: Interventions:  - Assess, plan, implement and evaluate the nursing care given to the patient in labor  - Advocate the philosophy that each childbirth experience is a unique experience and support the family's chosen level of involvement and control during the labor process   - Actively participate in both the patient's and family's teaching of the birth process  - Consider cultural, Tenriism and age-specific factors and plan care for the patient in labor  2022 0004 by Amish Ureña RN  Outcome: Progressing  2022 0003 by Amish Ureña RN  Outcome: Progressing     Problem: POSTPARTUM  Goal: Experiences normal postpartum course  Description: INTERVENTIONS:  - Monitor maternal vital signs  - Assess uterine involution and lochia  Outcome: Progressing  Goal: Appropriate maternal -  bonding  Description: INTERVENTIONS:  - Identify family support  - Assess for appropriate maternal/infant bonding   -Encourage maternal/infant bonding opportunities  - Referral to  or  as needed  Outcome: Progressing  Goal: Establishment of infant feeding pattern  Description: INTERVENTIONS:  - Assess breast/bottle feeding  - Refer to lactation as needed  Outcome: Progressing  Goal: Incision(s), wounds(s) or drain site(s) healing without S/S of infection  Description: INTERVENTIONS  - Assess and document dressing, incision, wound bed, drain sites and surrounding tissue  - Provide patient and family education  - Perform skin care/dressing changes every   Outcome: Progressing

## 2022-12-25 NOTE — DISCHARGE SUMMARY
Obstetrics Discharge Summary  Ryan Joya 25 y o  female MRN: [id number]  Unit/Bed#: -01 Encounter: [id number]    Admission Date: 2022     Discharge Date: 22    Admitting and Delivery Attending: Deepti Vallejo MD  Discharging Attending: Dr Gloria Narayan    Admitting Diagnoses:   1  Pregnancy at 38w6d  2  Spontaneous labor  3  Vaginal candidiasis  4  Anemia affecting pregnancy  5  A1GDM  6  Maternal sickle cell trait    Discharge Diagnoses:   Same, delivered    Procedures: spontaneous vaginal delivery    Anesthesia: epidural    Hospital course: Ryan Joya is now a 25 y o   who was initially admitted at 38w6d in spontaneous labor  She was diagnosed with a vaginal yeast infection on admission and this was treated with Diflucan  Labor was managed expectantly  After recieeving an epidural her course was augmented with artificial amniotomy  She progressed to complete cervical dilation and began pushing  On 2022 she delivered a viable female  38w6d via normal spontaneous vaginal delivery  She sustained second degree laceration during delivery, which was adequately repaired  's birth weight was 6lbs 15oz; Apgars were 8 (1 min) and 9 (5 min)   was admitted to the  nursery  Patient tolerated the procedure well  Her post-delivery course was uncomplicated  Her postpartum pain was well controlled with oral analgesics  Maternal blood type is A+ so RhoGAM was not indicated  On day of discharge, she was ambulating and able to reasonably perform all ADLs  She was voiding and had appropriate bowel function  Pain was well controlled  She was discharged home on postpartum day #2 without complications  Patient was instructed to follow up with her OBGYN as an outpatient and was given appropriate warnings to call provider if she develops signs of infection or uncontrolled pain      Complications: none apparent    Condition at discharge: good     Provisions for Follow-Up Care:  Please see after visit summary for information related to follow-up care and any pertinent home health orders  Disposition: Home    Planned Readmission: No    Discharge Medications:   Please see AVS for a complete list of discharge medications  Discharge instructions :   Please see AVS for complete discharge instructions

## 2022-12-26 VITALS
HEIGHT: 64 IN | OXYGEN SATURATION: 99 % | DIASTOLIC BLOOD PRESSURE: 55 MMHG | BODY MASS INDEX: 25.13 KG/M2 | WEIGHT: 147.2 LBS | RESPIRATION RATE: 18 BRPM | TEMPERATURE: 98.1 F | SYSTOLIC BLOOD PRESSURE: 106 MMHG | HEART RATE: 69 BPM

## 2022-12-26 RX ORDER — IBUPROFEN 600 MG/1
600 TABLET ORAL EVERY 6 HOURS PRN
Qty: 30 TABLET | Refills: 0 | Status: SHIPPED | OUTPATIENT
Start: 2022-12-26

## 2022-12-26 RX ORDER — ACETAMINOPHEN AND CODEINE PHOSPHATE 120; 12 MG/5ML; MG/5ML
1 SOLUTION ORAL DAILY
Qty: 30 TABLET | Refills: 2 | Status: SHIPPED | OUTPATIENT
Start: 2022-12-26

## 2022-12-26 RX ADMIN — DOCUSATE SODIUM 100 MG: 100 CAPSULE, LIQUID FILLED ORAL at 08:52

## 2022-12-26 RX ADMIN — IBUPROFEN 600 MG: 600 TABLET, FILM COATED ORAL at 01:39

## 2022-12-26 RX ADMIN — ACETAMINOPHEN 650 MG: 325 TABLET, FILM COATED ORAL at 08:52

## 2022-12-26 RX ADMIN — SENNOSIDES 8.6 MG: 8.6 TABLET, FILM COATED ORAL at 08:52

## 2022-12-26 NOTE — LACTATION NOTE
This note was copied from a baby's chart  Met with mother to discuss feeding plan  Mother would like to begin pumping to provide expressed breast milk to baby  Mother was given a hand pump to use until she is able to obtain an electric breast pump through [email or web address] and/or insurance  Discussed the importance to stimulate the breast using hand expression/pump 8-12x in a day to establish a milk supply  Mother was encouraged to pump/hand express before feedings/afterward until mature milk supply comes in  Parents were encouraged to call for further assistance and/or questions that arise prior to scheduled discharge today

## 2022-12-26 NOTE — PLAN OF CARE
Problem: PAIN - ADULT  Goal: Verbalizes/displays adequate comfort level or baseline comfort level  Description: Interventions:  - Encourage patient to monitor pain and request assistance  - Assess pain using appropriate pain scale  - Administer analgesics based on type and severity of pain and evaluate response  - Implement non-pharmacological measures as appropriate and evaluate response  - Consider cultural and social influences on pain and pain management  - Notify physician/advanced practitioner if interventions unsuccessful or patient reports new pain  12/26/2022 0843 by Abbie Cook RN  Outcome: Completed  12/26/2022 0738 by Abbie Cook RN  Outcome: Adequate for Discharge     Problem: INFECTION - ADULT  Goal: Absence or prevention of progression during hospitalization  Description: INTERVENTIONS:  - Assess and monitor for signs and symptoms of infection  - Monitor lab/diagnostic results  - Monitor all insertion sites, i e  indwelling lines, tubes, and drains  - Monitor endotracheal if appropriate and nasal secretions for changes in amount and color  - [place] appropriate cooling/warming therapies per order  - Administer medications as ordered  - Instruct and encourage patient and family to use good hand hygiene technique  - Identify and instruct in appropriate isolation precautions for identified infection/condition  12/26/2022 0843 by Abbie Cook RN  Outcome: Completed  12/26/2022 0738 by Abbie Cook RN  Outcome: Adequate for Discharge  Goal: Absence of fever/infection during neutropenic period  Description: INTERVENTIONS:  - Monitor WBC    12/26/2022 0843 by Abbie Cook RN  Outcome: Completed  12/26/2022 0738 by Abbie Cook RN  Outcome: Adequate for Discharge     Problem: SAFETY ADULT  Goal: Patient will remain free of falls  Description: INTERVENTIONS:  - Educate patient/family on patient safety including physical limitations  - Instruct patient to call for assistance with activity   - Consult OT/PT to assist with strengthening/mobility   - Keep Call bell within reach  - Keep bed low and locked with side rails adjusted as appropriate  - Keep care items and personal belongings within reach  - Initiate and maintain comfort rounds  - Make Fall Risk Sign visible to staff  - Offer Toileting every Hours, in advance of need  - Initiate/Maintain alarm  - Obtain necessary fall risk management equipment:   - Apply yellow socks and bracelet for high fall risk patients  - Consider moving patient to room near nurses station  12/26/2022 0843 by Rosanne Landry RN  Outcome: Completed  12/26/2022 0738 by Rosanne Landry RN  Outcome: Adequate for Discharge  Goal: Maintain or return to baseline ADL function  Description: INTERVENTIONS:  -  Assess patient's ability to carry out ADLs; assess patient's baseline for ADL function and identify physical deficits which impact ability to perform ADLs (bathing, care of mouth/teeth, toileting, grooming, dressing, etc )  - Assess/evaluate cause of self-care deficits   - Assess range of motion  - Assess patient's mobility; develop plan if impaired  - Assess patient's need for assistive devices and provide as appropriate  - Encourage maximum independence but intervene and supervise when necessary  - Involve family in performance of ADLs  - Assess for home care needs following discharge   - Consider OT consult to assist with ADL evaluation and planning for discharge  - Provide patient education as appropriate  12/26/2022 0843 by Rosanne Landry RN  Outcome: Completed  12/26/2022 0738 by Rosanne Landry RN  Outcome: Adequate for Discharge  Goal: Maintains/Returns to pre admission functional level  Description: INTERVENTIONS:  - Perform BMAT or MOVE assessment daily    - Set and communicate daily mobility goal to care team and patient/family/caregiver     - Out of bed for toileting  - Record patient progress and toleration of activity level   2022 by John Estrada RN  Outcome: Completed  2022 by John Estrada RN  Outcome: Adequate for Discharge     Problem: Knowledge Deficit  Goal: Patient/family/caregiver demonstrates understanding of disease process, treatment plan, medications, and discharge instructions  Description: Complete learning assessment and assess knowledge base    Interventions:  - Provide teaching at level of understanding  - Provide teaching via preferred learning methods  2022 by John Estrada RN  Outcome: Completed  2022 by John Estrada RN  Outcome: Adequate for Discharge     Problem: DISCHARGE PLANNING  Goal: Discharge to home or other facility with appropriate resources  Description: INTERVENTIONS:  - Identify barriers to discharge w/patient and caregiver  - Arrange for needed discharge resources and transportation as appropriate  - Identify discharge learning needs (meds, wound care, etc )  - Arrange for interpretive services to assist at discharge as needed  - Refer to Case Management Department for coordinating discharge planning if the patient needs post-hospital services based on physician/advanced practitioner order or complex needs related to functional status, cognitive ability, or social support system  2022 by John Estrada RN  Outcome: Completed  2022 by John Estrada RN  Outcome: Adequate for Discharge     Problem: POSTPARTUM  Goal: Experiences normal postpartum course  Description: INTERVENTIONS:  - Monitor maternal vital signs  - Assess uterine involution and lochia  2022 by John Estrada RN  Outcome: Completed  2022 by John Estrada RN  Outcome: Adequate for Discharge  Goal: Appropriate maternal -  bonding  Description: INTERVENTIONS:  - Identify family support  - Assess for appropriate maternal/infant bonding   -Encourage maternal/infant bonding opportunities  - Referral to  or  as needed  12/26/2022 0843 by Marcella Combs RN  Outcome: Completed  12/26/2022 0738 by Marcella Combs RN  Outcome: Adequate for Discharge  Goal: Establishment of infant feeding pattern  Description: INTERVENTIONS:  - Assess breast/bottle feeding  - Refer to lactation as needed  12/26/2022 0843 by Marcella Combs RN  Outcome: Completed  12/26/2022 0738 by Marcella Combs RN  Outcome: Adequate for Discharge  Goal: Incision(s), wounds(s) or drain site(s) healing without S/S of infection  Description: INTERVENTIONS  - Assess and document dressing, incision, wound bed, drain sites and surrounding tissue  - Provide patient and family education  - Perform skin care/dressing changes every   12/26/2022 0843 by Marcella Combs RN  Outcome: Completed  12/26/2022 0738 by Marcella Combs RN  Outcome: Adequate for Discharge

## 2022-12-26 NOTE — PLAN OF CARE
Problem: PAIN - ADULT  Goal: Verbalizes/displays adequate comfort level or baseline comfort level  Description: Interventions:  - Encourage patient to monitor pain and request assistance  - Assess pain using appropriate pain scale  - Administer analgesics based on type and severity of pain and evaluate response  - Implement non-pharmacological measures as appropriate and evaluate response  - Consider cultural and social influences on pain and pain management  - Notify physician/advanced practitioner if interventions unsuccessful or patient reports new pain  Outcome: Adequate for Discharge     Problem: INFECTION - ADULT  Goal: Absence or prevention of progression during hospitalization  Description: INTERVENTIONS:  - Assess and monitor for signs and symptoms of infection  - Monitor lab/diagnostic results  - Monitor all insertion sites, i e  indwelling lines, tubes, and drains  - Monitor endotracheal if appropriate and nasal secretions for changes in amount and color  - [place] appropriate cooling/warming therapies per order  - Administer medications as ordered  - Instruct and encourage patient and family to use good hand hygiene technique  - Identify and instruct in appropriate isolation precautions for identified infection/condition  Outcome: Adequate for Discharge  Goal: Absence of fever/infection during neutropenic period  Description: INTERVENTIONS:  - Monitor WBC    Outcome: Adequate for Discharge     Problem: SAFETY ADULT  Goal: Patient will remain free of falls  Description: INTERVENTIONS:  - Educate patient/family on patient safety including physical limitations  - Instruct patient to call for assistance with activity   - Consult OT/PT to assist with strengthening/mobility   - Keep Call bell within reach  - Keep bed low and locked with side rails adjusted as appropriate  - Keep care items and personal belongings within reach  - Initiate and maintain comfort rounds  - Make Fall Risk Sign visible to staff  - Offer Toileting every hours, in advance of need  - Initiate/Maintain alarm  - Obtain necessary fall risk management equipment:   - Apply yellow socks and bracelet for high fall risk patients  - Consider moving patient to room near nurses station  Outcome: Adequate for Discharge  Goal: Maintain or return to baseline ADL function  Description: INTERVENTIONS:  -  Assess patient's ability to carry out ADLs; assess patient's baseline for ADL function and identify physical deficits which impact ability to perform ADLs (bathing, care of mouth/teeth, toileting, grooming, dressing, etc )  - Assess/evaluate cause of self-care deficits   - Assess range of motion  - Assess patient's mobility; develop plan if impaired  - Assess patient's need for assistive devices and provide as appropriate  - Encourage maximum independence but intervene and supervise when necessary  - Involve family in performance of ADLs  - Assess for home care needs following discharge   - Consider OT consult to assist with ADL evaluation and planning for discharge  - Provide patient education as appropriate  Outcome: Adequate for Discharge  Goal: Maintains/Returns to pre admission functional level  Description: INTERVENTIONS:  - Perform BMAT or MOVE assessment daily    - Set and communicate daily mobility goal to care team and patient/family/caregiver  - Collaborate with rehabilitation services on mobility goals if consulted  - Out of bed for toileting  - Record patient progress and toleration of activity level   Outcome: Adequate for Discharge     Problem: Knowledge Deficit  Goal: Patient/family/caregiver demonstrates understanding of disease process, treatment plan, medications, and discharge instructions  Description: Complete learning assessment and assess knowledge base    Interventions:  - Provide teaching at level of understanding  - Provide teaching via preferred learning methods  Outcome: Adequate for Discharge     Problem: DISCHARGE PLANNING  Goal: Discharge to home or other facility with appropriate resources  Description: INTERVENTIONS:  - Identify barriers to discharge w/patient and caregiver  - Arrange for needed discharge resources and transportation as appropriate  - Identify discharge learning needs (meds, wound care, etc )  - Arrange for interpretive services to assist at discharge as needed  - Refer to Case Management Department for coordinating discharge planning if the patient needs post-hospital services based on physician/advanced practitioner order or complex needs related to functional status, cognitive ability, or social support system  Outcome: Adequate for Discharge     Problem: POSTPARTUM  Goal: Experiences normal postpartum course  Description: INTERVENTIONS:  - Monitor maternal vital signs  - Assess uterine involution and lochia  Outcome: Adequate for Discharge  Goal: Appropriate maternal -  bonding  Description: INTERVENTIONS:  - Identify family support  - Assess for appropriate maternal/infant bonding   -Encourage maternal/infant bonding opportunities  - Referral to  or  as needed  Outcome: Adequate for Discharge  Goal: Establishment of infant feeding pattern  Description: INTERVENTIONS:  - Assess breast/bottle feeding  - Refer to lactation as needed  Outcome: Adequate for Discharge  Goal: Incision(s), wounds(s) or drain site(s) healing without S/S of infection  Description: INTERVENTIONS  - Assess and document dressing, incision, wound bed, drain sites and surrounding tissue  - Provide patient and family education  - Perform skin care/dressing changes every   Outcome: Adequate for Discharge

## 2022-12-26 NOTE — PROGRESS NOTES
Progress Note - OB/GYN   Barb Shetty 25 y o  female MRN: [id number]  Unit/Bed#: -01 Encounter: [id number]    Assessment:  25 y o   s/p Spontaneous Vaginal Delivery Postpartum day  2    Plan:  1  Routine post-partum care  2  Encourage ambulation  3  Pain control as needed  4  Advance diet as tolerated  5  Rhogam not indicated  6  Anticipate discharge today    Subjective/Objective   Chief Complaint:       Subjective:  Barb Shetty is well appearing and has no complaints at this time  She denies any dizziness, nausea, vomiting, chest pain, shortness of breath, palpitations, or headaches  Pain: Well controlled with pain medication regimen  Tolerating PO: yes  Voiding: yes  Flatus: yes  BM: no  Ambulating: yes  Breastfeeding: no  Chest pain: no  Shortness of breath: no  Leg pain: no  Lochia: Decreasing    Objective:     Vitals: Blood pressure 105/53, pulse 73, temperature 98 1 °F (36 7 °C), temperature source Oral, resp  rate 16, height 5' 4" (1 626 m), weight 66 8 kg (147 lb 3 2 oz), last menstrual period 2022, SpO2 99 %, not currently breastfeeding    No intake or output data in the 24 hours ending 22 0636    Physical Exam:     General: NAD  Cardiovascular: RRR, no murmur, nl S1/S2   Lungs: CTAB, non-labored breathing   Abdomen: Soft, no distension/rebound/guarding/tenderness   Fundus: Firm, non-tender, fundus: -3 cm below the umbilicus   Lower Extremities: Non-tender    Lab, Imaging and other studies:     Recent Results (from the past 72 hour(s))   CBC and Platelet    Collection Time: 22  2:11 PM   Result Value Ref Range    WBC 13 33 (H) 4 31 - 10 16 Thousand/uL    RBC 5 34 (H) 3 81 - 5 12 Million/uL    Hemoglobin 13 0 11 5 - 15 4 g/dL    Hematocrit 42 2 34 8 - 46 1 %    MCV 79 (L) 82 - 98 fL    MCH 24 3 (L) 26 8 - 34 3 pg    MCHC 30 8 (L) 31 4 - 37 4 g/dL    RDW 17 7 (H) 11 6 - 15 1 %    Platelets 318 (H) 592 - 390 Thousands/uL    MPV 9 6 8 9 - 12 7 fL   RPR    Collection Time: 12/24/22  2:11 PM   Result Value Ref Range    RPR Non-Reactive Non-Reactive   Comprehensive metabolic panel    Collection Time: 12/24/22  2:11 PM   Result Value Ref Range    Sodium 138 135 - 147 mmol/L    Potassium 3 8 3 5 - 5 3 mmol/L    Chloride 108 96 - 108 mmol/L    CO2 23 21 - 32 mmol/L    ANION GAP 7 4 - 13 mmol/L    BUN 5 5 - 25 mg/dL    Creatinine 0 62 0 60 - 1 30 mg/dL    Glucose 80 65 - 140 mg/dL    Calcium 9 6 8 4 - 10 2 mg/dL    AST 15 13 - 39 U/L    ALT 11 7 - 52 U/L    Alkaline Phosphatase 236 (H) 34 - 104 U/L    Total Protein 7 7 6 4 - 8 4 g/dL    Albumin 4 0 3 5 - 5 0 g/dL    Total Bilirubin 0 42 0 20 - 1 00 mg/dL    eGFR 128 ml/min/1 73sq m   Fingerstick Glucose (POCT)    Collection Time: 12/24/22  2:59 PM   Result Value Ref Range    POC Glucose 77 65 - 140 mg/dl   Type and screen    Collection Time: 12/24/22  3:48 PM   Result Value Ref Range    ABO Grouping A     Rh Factor Positive     Antibody Screen Negative     Specimen Expiration Date 20221227    Fingerstick Glucose (POCT)    Collection Time: 12/24/22  4:02 PM   Result Value Ref Range    POC Glucose 74 65 - 140 mg/dl   Fingerstick Glucose (POCT)    Collection Time: 12/24/22  6:07 PM   Result Value Ref Range    POC Glucose 77 65 - 140 mg/dl   CORD, Blood gas, arterial    Collection Time: 12/24/22  8:51 PM   Result Value Ref Range    pH, Cord Art 7 181 (L) 7 230 - 7 430    pCO2, Cord Art 63 3 (H) 30 0 - 60 0    pO2, Cord Art <10 0 5 0 - 25 0 mm HG    HCO3, Cord Art 23 1 17 3 - 27 3 mmol/L    Base Exc, Cord Art -6 5 (L) 3 0 - 11 0 mmol/L    O2 Content, Cord Art      O2 Hgb, Arterial Cord 9 3 %   CORD, Blood gas, venous    Collection Time: 12/24/22  8:51 PM   Result Value Ref Range    pH, Cord Hcilo 7 253 7 190 - 7 490    pCO2, Cord Chilo 52 5 (H) 27 0 - 43 0 mm HG    pO2, Cord Chilo <10 0 (L) 15 0 - 45 0 mm HG    HCO3, Cord Chilo 22 7 12 2 - 28 6 mmol/L    Base Exc, Cord Chilo -5 2 (L) 1 0 - 9 0 mmol/L    O2 Cont, Cord Chilo      O2 HGB,VENOUS CORD 11 0 %   Hepatitis B surface antigen    Collection Time: 12/25/22  9:50 AM   Result Value Ref Range    Hepatitis B Surface Ag Non-reactive Non-reactive, NonReactive - Confirmed     Meds:  docusate sodium, 100 mg, Oral, BID  senna, 1 tablet, Oral, Daily      acetaminophen, 650 mg, Q4H PRN  benzocaine-menthol-lanolin-aloe, 1 application, M7Z PRN  calcium carbonate, 1,000 mg, Daily PRN  diphenhydrAMINE, 25 mg, Q6H PRN  hydrocortisone, 1 application, Daily PRN  ibuprofen, 600 mg, Q6H PRN  ondansetron, 4 mg, Q8H PRN  simethicone, 80 mg, 4x Daily PRN  witch hazel-glycerin, 1 pad, Q4H PRN              Signature / Title: Alexander Cheung MD, Ob/Gyn, PGY-4  Date: 12/26/2022  Time: 6:36 AM

## 2022-12-26 NOTE — PLAN OF CARE
Problem: PAIN - ADULT  Goal: Verbalizes/displays adequate comfort level or baseline comfort level  Description: Interventions:  - Encourage patient to monitor pain and request assistance  - Assess pain using appropriate pain scale  - Administer analgesics based on type and severity of pain and evaluate response  - Implement non-pharmacological measures as appropriate and evaluate response  - Consider cultural and social influences on pain and pain management  - Notify physician/advanced practitioner if interventions unsuccessful or patient reports new pain  Outcome: Progressing     Problem: INFECTION - ADULT  Goal: Absence or prevention of progression during hospitalization  Description: INTERVENTIONS:  - Assess and monitor for signs and symptoms of infection  - Monitor lab/diagnostic results  - Monitor all insertion sites, i e  indwelling lines, tubes, and drains  - Monitor endotracheal if appropriate and nasal secretions for changes in amount and color  - [place] appropriate cooling/warming therapies per order  - Administer medications as ordered  - Instruct and encourage patient and family to use good hand hygiene technique  - Identify and instruct in appropriate isolation precautions for identified infection/condition  Outcome: Progressing  Goal: Absence of fever/infection during neutropenic period  Description: INTERVENTIONS:  - Monitor WBC    Outcome: Progressing     Problem: SAFETY ADULT  Goal: Patient will remain free of falls  Description: INTERVENTIONS:  - Educate patient/family on patient safety including physical limitations  - Instruct patient to call for assistance with activity   - Consult OT/PT to assist with strengthening/mobility   - Keep Call bell within reach  - Keep bed low and locked with side rails adjusted as appropriate  - Keep care items and personal belongings within reach  - Initiate and maintain comfort rounds  - Make Fall Risk Sign visible to staff  - Offer Toileting every Hours, in advance of need  - Initiate/Maintain alarm  - Obtain necessary fall risk management equipment:   - Apply yellow socks and bracelet for high fall risk patients  - Consider moving patient to room near nurses station  Outcome: Progressing  Goal: Maintain or return to baseline ADL function  Description: INTERVENTIONS:  -  Assess patient's ability to carry out ADLs; assess patient's baseline for ADL function and identify physical deficits which impact ability to perform ADLs (bathing, care of mouth/teeth, toileting, grooming, dressing, etc )  - Assess/evaluate cause of self-care deficits   - Assess range of motion  - Assess patient's mobility; develop plan if impaired  - Assess patient's need for assistive devices and provide as appropriate  - Encourage maximum independence but intervene and supervise when necessary  - Involve family in performance of ADLs  - Assess for home care needs following discharge   - Consider OT consult to assist with ADL evaluation and planning for discharge  - Provide patient education as appropriate  Outcome: Progressing  Goal: Maintains/Returns to pre admission functional level  Description: INTERVENTIONS:  - Perform BMAT or MOVE assessment daily    - Set and communicate daily mobility goal to care team and patient/family/caregiver  - Collaborate with rehabilitation services on mobility goals if consulted  - Perform Range of Motion times a day  - Reposition patient every hours    - Dangle patient times a day  - Stand patient times a day  - Ambulate patient times a day  - Out of bed to chair times a day   - Out of bed for meals times a day  - Out of bed for toileting  - Record patient progress and toleration of activity level   Outcome: Progressing     Problem: Knowledge Deficit  Goal: Patient/family/caregiver demonstrates understanding of disease process, treatment plan, medications, and discharge instructions  Description: Complete learning assessment and assess knowledge base   Interventions:  - Provide teaching at level of understanding  - Provide teaching via preferred learning methods  Outcome: Progressing     Problem: DISCHARGE PLANNING  Goal: Discharge to home or other facility with appropriate resources  Description: INTERVENTIONS:  - Identify barriers to discharge w/patient and caregiver  - Arrange for needed discharge resources and transportation as appropriate  - Identify discharge learning needs (meds, wound care, etc )  - Arrange for interpretive services to assist at discharge as needed  - Refer to Case Management Department for coordinating discharge planning if the patient needs post-hospital services based on physician/advanced practitioner order or complex needs related to functional status, cognitive ability, or social support system  Outcome: Progressing     Problem: POSTPARTUM  Goal: Experiences normal postpartum course  Description: INTERVENTIONS:  - Monitor maternal vital signs  - Assess uterine involution and lochia  Outcome: Progressing  Goal: Appropriate maternal -  bonding  Description: INTERVENTIONS:  - Identify family support  - Assess for appropriate maternal/infant bonding   -Encourage maternal/infant bonding opportunities  - Referral to  or  as needed  Outcome: Progressing  Goal: Establishment of infant feeding pattern  Description: INTERVENTIONS:  - Assess breast/bottle feeding  - Refer to lactation as needed  Outcome: Progressing  Goal: Incision(s), wounds(s) or drain site(s) healing without S/S of infection  Description: INTERVENTIONS  - Assess and document dressing, incision, wound bed, drain sites and surrounding tissue  - Provide patient and family education  - Perform skin care/dressing changes every   Outcome: Progressing

## 2022-12-27 NOTE — UTILIZATION REVIEW
NOTIFICATION OF INPATIENT ADMISSION   MATERNITY/DELIVERY AUTHORIZATION REQUEST   SERVICING FACILITY:   [facility] - L&D, [place], NICU  [address]  Tax ID: [id number]  NPI: [id number]   ATTENDING PROVIDER:  Attending Name and NPI#: Carl Freeman Md [[id number]]  Address: [address] [place] [address]  Phone: [phone]   ADMISSION INFORMATION:  Place of Service: Inpatient [address] [facility]  [address]  Place of Service Code: 21  Inpatient Admission Date/Time: 22  2:03 PM  Discharge Date/Time: 2022 12:52 PM  Admitting Diagnosis Code/Description:  Encounter for full-term uncomplicated delivery [Y59]     Mother: Barb Shetty 2000 Estimated Date of Delivery: 23  Delivering clinician: Mena Riley    OB History        2    Para   1    Term   1       0    AB   1    Living   1       SAB   0    IAB   0    Ectopic   0    Multiple   0    Live Births   1                Name & MRN:   Information for the patient's :  Caroline Daugherty [[id number]]     [place] Delivery Information:  Sex: female  Delivered 2022 8:45 PM by Vaginal, Spontaneous; Gestational Age: 38w7d    [place] Measurements:  Weight: 6 lb 14 8 oz (3140 g); Height: 19"    APGAR 1 minute 5 minutes 10 minutes   Totals: 8 9       Birth Information: 25 y o  female MRN: [id number] Unit/Bed#: -01   Birthweight: No birth weight on file  Gestational Age: <None> Delivery Type:    APGARS Totals:        UTILIZATION REVIEW CONTACT:  Gavino Garcia Utilization   Network Utilization Review Department  Phone: [phone]  Fax [phone]  Email: [email or web address]  Contact for approvals/pending authorizations, clinical reviews, and discharge       PHYSICIAN ADVISORY SERVICES:  Medical Necessity Denial & [employer]ps-tw-P[employer] Review  Phone: [phone]  Fax: [phone]  Email: [email or web address]

## 2022-12-29 DIAGNOSIS — Z86.32 HISTORY OF GESTATIONAL DIABETES: ICD-10-CM

## 2022-12-29 DIAGNOSIS — Z13.1 DIABETES MELLITUS SCREENING: Primary | ICD-10-CM

## 2022-12-29 NOTE — UTILIZATION REVIEW
NOTIFICATION OF INPATIENT ADMISSION   MATERNITY/DELIVERY AUTHORIZATION REQUEST   SERVICING FACILITY:   [facility] - L&D, , NICU  [address]  Tax ID: [id number]  NPI: [id number]   ATTENDING PROVIDER:  Attending Name and NPI#: Manuel Messina Md [[id number]]  Address: [address] [place], [place] [address]  Phone: [phone]   ADMISSION INFORMATION:  Place [facility]patient [address] [facility]  [address]  Place of Service Code: 21  Inpatient Admission Date/Time: 22  2:03 PM  Discharge Date/Time: 2022 12:52 PM  Admitting Diagnosis Code/Description:  Encounter for full-term uncomplicated delivery [U12]     Mother: Thaddeus Roberts 2000 Estimated Date of Delivery: 23  Delivering clinician: Daniel Riley    OB History        2    Para   1    Term   1       0    AB   1    Living   1       SAB   0    IAB   0    Ectopic   0    Multiple   0    Live Births   1                Name & MRN:   Information for the patient's :  August Herrera [[id number]]     [place] Delivery Information:  Sex: female  Delivered 2022 8:45 PM by Vaginal, Spontaneous; Gestational Age: 38w7d     Measurements:  Weight: 6 lb 14 8 oz (3140 g); Height: 19"    APGAR 1 minute 5 minutes 10 minutes   Totals: 8 9       Birth Information: 25 y o  female MRN: [id number] Unit/Bed#: -01   Birthweight: No birth weight on file  Gestational Age: <None> Delivery Type:    APGARS Totals:        UTILIZATION REVIEW CONTACT:  [employer], Utilization   Network Utilization Review Department  Phone: [phone]  Fax [phone]  Email: [email or web address]  Contact for approvals/pending authorizations, clinical reviews, and discharge       PHYSICIAN ADVISORY SERVICES:  Medical Necessity Denial & [employer] Review  Phone: [phone]  Fax: [phone]  Email: [email or web address]         NOTIFICATION OF ADMISSION DISCHARGE   This is a Notification of Discharge from [address]  Please be advised that this patient has been discharge from our facility  Below you will find the admission and discharge date and time including the patient’s disposition  UTILIZATION REVIEW CONTACT:  Ruthy Oden  Utilization   Network Utilization Review Department  Phone: [phone] x carefully listen to the prompts  All voicemails are confidential   Email: [email or web address]     ADMISSION INFORMATION  PRESENTATION DATE: 12/24/2022 12:55 PM  OBERVATION ADMISSION DATE:   INPATIENT ADMISSION DATE: 12/24/22  2:03 PM   DISCHARGE DATE: 12/26/2022 12:52 PM   DISPOSITION:Home/Self Care    IMPORTANT INFORMATION:  Send all requests for admission clinical reviews, approved or denied determinations and any other requests to dedicated fax number below belonging to the campus where the patient is receiving treatment   List of dedicated fax numbers:  [address] DENIALS (Administrative/Medical Necessity) [phone]   [address] (Maternity/NICU/Pediatrics) [phone]   [facility] [phone]   [place] [address] [phone]   [address] [phone]   [address] [facility] [phone]   90 [facility] [phone]   [address] [place] [address] 119 [phone]   [facility]  [phone]   [address] [facility] [phone]   [address] [facility] [address] [facility] [phone]

## 2023-03-03 ENCOUNTER — POSTPARTUM VISIT (OUTPATIENT)
Dept: OBGYN CLINIC | Facility: CLINIC | Age: 23
End: 2023-03-03

## 2023-03-03 VITALS — DIASTOLIC BLOOD PRESSURE: 88 MMHG | WEIGHT: 126 LBS | BODY MASS INDEX: 21.63 KG/M2 | SYSTOLIC BLOOD PRESSURE: 122 MMHG

## 2023-03-03 DIAGNOSIS — Z30.09 ENCOUNTER FOR OTHER GENERAL COUNSELING OR ADVICE ON CONTRACEPTION: Primary | ICD-10-CM

## 2023-03-03 NOTE — PROGRESS NOTES
Subjective     Fredo Ware is a 25 y o  G2, P3 female here for a visit  Patient does not seem completely sure why she is here, patient was scheduled as a postpartum visit but desires to discuss contraception  Patient here with her mother and the baby reports that all is going well  Patient had vaginal delivery  and is about 2-1/2 months postpartum  Patient reports she has had some irregular menses and baby is currently bottlefeeding  Breast is fully recovered  Reviewed options for contraception patient is not currently sexually active and not currently with a partner  Patient is interested in C/ [place] 9 she was fully counseled  Patient also given booklet for more information we will message staff and have them check with her insurance regarding device and placement  Patient reports tolerating regular diet, voiding and bowel without difficulty and overall feels recovered from delivery  Gynecologic History  No LMP recorded  Contraception: abstinence  Last Pap: 2022  Results were: normal      Obstetric History  OB History    Para Term  AB Living   2 1 1 0 1 1   SAB IAB Ectopic Multiple Live Births   0 0 0 0 1      # Outcome Date GA Lbr Lenny/2nd Weight Sex Delivery Anes PTL Lv   2 Term 22 38w6d / 00:30 3140 g (6 lb 14 8 oz) F Vag-Spont EPI N MACI   1 AB 2021              Complications: Blighted ovum     Past Medical History:   Diagnosis Date   • Anemia    • Asthma    • Gestational diabetes     diet controlled   • Seasonal allergies    • Varicella      History reviewed  No pertinent surgical history  Review of Systems  Review of Systems   Constitutional: Negative for chills and fever  HENT: Negative for ear pain and sore throat  Eyes: Negative for pain and visual disturbance  Respiratory: Negative for cough and shortness of breath  Cardiovascular: Negative for chest pain and palpitations  Gastrointestinal: Negative for abdominal pain and vomiting  Genitourinary: Negative for dysuria and hematuria  Musculoskeletal: Negative for arthralgias and back pain  Skin: Negative for color change and rash  Neurological: Negative for seizures and syncope  All other systems reviewed and are negative  Objective     /88 (BP Location: Right arm, Patient Position: Sitting, Cuff Size: Standard)   Wt 57 2 kg (126 lb)   Breastfeeding No   BMI 21 63 kg/m²   General appearance: alert and oriented, in no acute distress  Lungs: clear to auscultation bilaterally  Heart: regular rate and rhythm, S1, S2 normal, no murmur, click, rub or gallop  Abdomen: soft, non-tender; bowel sounds normal; no masses,  no organomegaly  Extremities: extremities normal, warm and well-perfused; no cyanosis, clubbing, or edema      Assessment  25year-old G2, P1 2-1/2 months postpartum desire Nexplanon for contraception     Plan  We will contact staff and have them evaluate coverage and order in and schedule Nexplanon placement for patient    Due for annual in July

## 2023-05-05 ENCOUNTER — OFFICE VISIT (OUTPATIENT)
Dept: FAMILY MEDICINE CLINIC | Facility: CLINIC | Age: 23
End: 2023-05-05

## 2023-05-05 VITALS
RESPIRATION RATE: 18 BRPM | TEMPERATURE: 98.2 F | DIASTOLIC BLOOD PRESSURE: 57 MMHG | HEART RATE: 63 BPM | SYSTOLIC BLOOD PRESSURE: 114 MMHG | BODY MASS INDEX: 21.28 KG/M2 | OXYGEN SATURATION: 100 % | WEIGHT: 124 LBS

## 2023-05-05 DIAGNOSIS — J06.9 VIRAL URI: Primary | ICD-10-CM

## 2023-05-05 RX ORDER — PHENOL 1.4 %
1 AEROSOL, SPRAY (ML) MUCOUS MEMBRANE EVERY 2 HOUR PRN
Qty: 177 ML | Refills: 0 | Status: SHIPPED | OUTPATIENT
Start: 2023-05-05

## 2023-05-05 NOTE — PROGRESS NOTES
Name: Fausto Bernstein      : 2000      MRN: [id number]  Encounter Provider: Nickie Lee MD  Encounter Date: 2023   Encounter department: [place] [address]  Viral URI  -     phenol (Chloraseptic) 1 4 % mucosal liquid; Apply 1 spray to the mouth or throat every 2 (two) hours as needed (sore throat)      Patient with sore throat for 5 days, some coughing as well  Exam wnl except for mild throat erythema  Advised symptomatic treatment like tea with honey, soothing drinks, tylenol if needed  RTO is she develops fever, chills, worsening/severe pain, muffled voice  Subjective      Sore Throat   This is a new problem  The current episode started in the past 7 days  The problem has been gradually worsening  Neither side of throat is experiencing more pain than the other  There has been no fever  The pain is at a severity of 7/10  Associated symptoms include coughing, a hoarse voice and swollen glands  Pertinent negatives include no abdominal pain, congestion, ear pain, headaches, plugged ear sensation, shortness of breath or vomiting  She has tried nothing for the symptoms  Review of Systems   Constitutional: Negative for chills and fever  HENT: Positive for hoarse voice and sore throat  Negative for congestion, ear pain and rhinorrhea  Eyes: Negative for pain and visual disturbance  Respiratory: Positive for cough  Negative for shortness of breath  Cardiovascular: Negative for chest pain and palpitations  Gastrointestinal: Negative for abdominal pain, nausea and vomiting  Genitourinary: Negative for dysuria  Skin: Negative for color change and rash  Neurological: Negative for syncope and headaches         Current Outpatient Medications on File Prior to Visit   Medication Sig    acetaminophen (TYLENOL) 325 mg tablet Take 2 tablets (650 mg total) by mouth every 6 (six) hours as needed for mild pain or headaches (Patient not taking: Reported on 3/3/2023)    albuterol (Proventil HFA) 90 mcg/act inhaler Inhale 2 puffs every 6 (six) hours as needed for wheezing    Blood Glucose Monitoring Suppl (OneTouch Verio Flex System) w/Device KIT Test x4 Daily or as instructed (Patient not taking: Reported on 3/3/2023)    ibuprofen (MOTRIN) 600 mg tablet Take 1 tablet (600 mg total) by mouth every 6 (six) hours as needed for moderate pain (Patient not taking: Reported on 3/3/2023)    norethindrone (Ortho Micronor) 0 35 MG tablet Take 1 tablet (0 35 mg total) by mouth daily (Patient not taking: Reported on 3/3/2023)    ondansetron (ZOFRAN) 4 mg tablet Take 1 tablet (4 mg total) by mouth every 8 (eight) hours as needed for nausea or vomiting (Patient not taking: Reported on 3/3/2023)    Prenatal MV-Min-Fe Fum-FA-DHA (Prenatal+DHA) 28-0 975 & 200 MG MISC Take 1 tablet by mouth in the morning (Patient not taking: Reported on 3/3/2023)       Objective     /57   Pulse 63   Temp 98 2 °F (36 8 °C)   Resp 18   Wt 56 2 kg (124 lb)   SpO2 100%   BMI 21 28 kg/m²     Physical Exam  Vitals reviewed  Constitutional:       General: She is not in acute distress  HENT:      Head: Normocephalic and atraumatic  Right Ear: Tympanic membrane, ear canal and external ear normal       Left Ear: Tympanic membrane, ear canal and external ear normal       Nose: No congestion  Mouth/Throat:      Mouth: Mucous membranes are moist       Pharynx: Posterior oropharyngeal erythema (mild) present  No oropharyngeal exudate  Cardiovascular:      Rate and Rhythm: Normal rate and regular rhythm  Pulmonary:      Effort: Pulmonary effort is normal  No respiratory distress  Breath sounds: Normal breath sounds  No rhonchi or rales  Abdominal:      Palpations: Abdomen is soft  Tenderness: There is no abdominal tenderness  Musculoskeletal:      Cervical back: Neck supple  Lymphadenopathy:      Cervical: No cervical adenopathy     Skin:     Findings: No rash    Neurological:      Mental Status: She is alert         Anh Rivera MD

## 2023-05-05 NOTE — PATIENT INSTRUCTIONS
Viral Syndrome   WHAT YOU NEED TO KNOW:   What is viral syndrome? Viral syndrome is a term used for symptoms of an infection caused by a virus  Viruses are spread easily from person to person on shared items  What are the signs and symptoms of viral syndrome? Signs and symptoms may start slowly or suddenly and last hours to days  They can be mild to severe and can change over days or hours  You may have any of the following:  Fever and chills    A runny or stuffy nose    Cough, sore throat, or hoarseness    Headache, or pain and pressure around your eyes    Muscle aches and joint pain    Shortness of breath or wheezing    Abdominal pain, cramps, and diarrhea    Nausea, vomiting, or loss of appetite    How is viral syndrome diagnosed and treated? Your healthcare provider will ask about your symptoms and examine you  Antibiotics are not given for a viral infection  The following may help you feel better:  Acetaminophen  decreases pain and fever  It is available without a doctor's order  Ask how much to take and how often to take it  Follow directions  Read the labels of all other medicines you are using to see if they also contain acetaminophen, or ask your doctor or pharmacist  Acetaminophen can cause liver damage if not taken correctly  NSAIDs , such as ibuprofen, help decrease swelling, pain, and fever  NSAIDs can cause stomach bleeding or kidney problems in certain people  If you take blood thinner medicine, always ask your healthcare provider if NSAIDs are safe for you  Always read the medicine label and follow directions  Cold medicine  helps decrease swelling, control a cough, and relieve chest or nasal congestion  Saline nasal spray  helps relieve congestion in your sinuses  How can I manage my symptoms? Drink liquids as directed to prevent dehydration  Ask how much liquid to drink each day and which liquids are best for you  Do not drink liquids with caffeine   Caffeine can make dehydration worse      Get plenty of rest to help your body heal   Take naps throughout the day  Ask your healthcare provider when you can return to work and your normal activities  Use a cool mist humidifier  to increase air moisture in your home  This may make it easier for you to breathe and help decrease your cough  Drink warm tea with honey or use cough drops for a sore throat  Cough drops are available without a doctor's order  Follow directions for taking cough drops  Do not smoke or be close to anyone who is smoking  Nicotine and other chemicals in cigarettes and cigars can cause lung damage  Smoking can also delay healing  Ask your healthcare provider for information if you currently smoke and need help to quit  E-cigarettes or smokeless tobacco still contain nicotine  Talk to your healthcare provider before you use these products  What can I do to prevent the spread of germs? Wash your hands often throughout the day  Use soap and water  Rub your soapy hands together, lacing your fingers, for at least 20 seconds  Rinse with warm, running water  Dry your hands with a clean towel or paper towel  Use hand  that contains alcohol if soap and water are not available  Cover sneezes and coughs  Turn your face away and cover your mouth and nose with a tissue  Throw the tissue away  Use the bend of your arm if a tissue is not available  Then wash your hands well with soap and water or use hand   Stay home while you are sick  Avoid crowds as much as possible  Get the influenza (flu) vaccine as soon as recommended each year  The flu vaccine is available starting in September or October  Ask your healthcare provider about the pneumonia vaccine  This vaccine is usually recommended every 5 years in older adults  Call your local emergency number ([address] in the [address] [place] [address],[address]), or have someone call if:   You have a seizure  You cannot be woken      You have chest pain or trouble breathing  When should I seek immediate care? You have a stiff neck, a bad headache, and sensitivity to light  You feel weak, dizzy, or confused  You stop urinating or urinate a lot less than usual     You cough up blood or thick yellow or green mucus  You have severe abdominal pain or your abdomen is larger than usual     When should I call my doctor? Your symptoms do not get better with treatment or get worse after 3 days  You have a rash or ear pain  You have burning when you urinate  You have questions or concerns about your condition or care  CARE AGREEMENT:   You have the right to help plan your care  Learn about your health condition and how it may be treated  Discuss treatment options with your healthcare providers to decide what care you want to receive  You always have the right to refuse treatment  The above information is an  only  It is not intended as medical advice for individual conditions or treatments  Talk to your doctor, nurse or pharmacist before following any medical regimen to see if it is safe and effective for you  © Copyright Jennifer Dupree 2022 Information is for End User's use only and may not be sold, redistributed or otherwise used for commercial purposes

## 2023-07-06 ENCOUNTER — TELEPHONE (OUTPATIENT)
Dept: OTHER | Facility: HOSPITAL | Age: 23
End: 2023-07-06

## 2023-07-06 ENCOUNTER — TELEPHONE (OUTPATIENT)
Dept: FAMILY MEDICINE CLINIC | Facility: CLINIC | Age: 23
End: 2023-07-06

## 2023-07-06 ENCOUNTER — ANNUAL EXAM (OUTPATIENT)
Dept: OBGYN CLINIC | Facility: CLINIC | Age: 23
End: 2023-07-06
Payer: COMMERCIAL

## 2023-07-06 VITALS
WEIGHT: 123 LBS | SYSTOLIC BLOOD PRESSURE: 100 MMHG | HEIGHT: 64 IN | DIASTOLIC BLOOD PRESSURE: 70 MMHG | BODY MASS INDEX: 21 KG/M2

## 2023-07-06 DIAGNOSIS — Z01.419 ENCOUNTER FOR GYNECOLOGICAL EXAMINATION WITHOUT ABNORMAL FINDING: Primary | ICD-10-CM

## 2023-07-06 PROCEDURE — 99395 PREV VISIT EST AGE 18-39: CPT | Performed by: OBSTETRICS & GYNECOLOGY

## 2023-07-06 NOTE — TELEPHONE ENCOUNTER
----- Message from Spike Willard MD sent at 7/6/2023  1:18 PM EDT -----  Please call patient to make appointment for evaluation of breast bump/possible mass.   Thank you

## 2023-07-06 NOTE — PROGRESS NOTES
Neal Price is a 25 y.o. G2, P3 female who presents for annual well woman exam.  Baby is 6 months and doing well generally sleeping okay. Periods are regular every 28-30 days, lasting 4 days. No intermenstrual bleeding, spotting, or discharge. Patient reports No hot flashes/night sweats, no current partner not sexually active, No vaginal dryness, sleeping fairly well. Current contraception: abstinence  History of abnormal Pap smear: no  Family history of uterine or ovarian cancer: no  Regular self breast exam: yes  History of abnormal mammogram: no  Family history of breast cancer: no    Menstrual History:  OB History        2    Para   1    Term   1       0    AB   1    Living   1       SAB   0    IAB   0    Ectopic   0    Multiple   0    Live Births   1                Patient's last menstrual period was 2023 (approximate). The following portions of the patient's history were reviewed and updated as appropriate: allergies, current medications, past family history, past medical history, past social history, past surgical history and problem list.  Past Medical History:   Diagnosis Date   • Anemia    • Asthma    • Gestational diabetes     diet controlled   • Seasonal allergies    • Varicella      History reviewed. No pertinent surgical history. OB History        2    Para   1    Term   1       0    AB   1    Living   1       SAB   0    IAB   0    Ectopic   0    Multiple   0    Live Births   1                 Review of Systems  Review of Systems   Constitutional: Negative for fatigue, fever and unexpected weight change. HENT: Negative for dental problem, sinus pressure and sinus pain. Eyes: Negative for visual disturbance. Respiratory: Negative for cough, shortness of breath and wheezing. Cardiovascular: Negative for chest pain and leg swelling. Gastrointestinal: Negative for blood in stool, constipation, diarrhea, nausea and vomiting. Endocrine: Negative for cold intolerance, heat intolerance and polydipsia. Genitourinary: Negative for dysuria, frequency, hematuria, menstrual problem and pelvic pain. Musculoskeletal: Negative for arthralgias and back pain. Neurological: Negative for dizziness, seizures and headaches. Psychiatric/Behavioral: The patient is not nervous/anxious. Objective      /70 (BP Location: Right arm, Patient Position: Sitting)   Ht 5' 4" (1.626 m)   Wt 55.8 kg (123 lb)   LMP 05/25/2023 (Approximate)   Breastfeeding No   BMI 21.11 kg/m²   Vitals:    07/06/23 1359   BP: 100/70   BP Location: Right arm   Patient Position: Sitting   Weight: 55.8 kg (123 lb)   Height: 5' 4" (1.626 m)     General:   alert and oriented, in no acute distress   Heart: regular rate and rhythm, S1, S2 normal, no murmur, click, rub or gallop   Lungs: clear to auscultation bilaterally   Abdomen: soft, non-tender, without masses or organomegaly   Vulva: normal   Vagina: normal mucosa   Cervix: no cervical motion tenderness and no lesions   Uterus: normal size, mobile, non-tender   Adnexa: normal adnexa and no mass, fullness, tenderness   Breast inspection negative, no nipple discharge or bleeding, no masses or nodularity palpable  Rectal deferred, not of age for screening  Thyroid normal no masses or nodules     Assessment   25year-old G2, P1 here for annual exam getting support at home baby is doing well living with her parents. No current partner     Plan    ThinPrep with reflex testing performed return in 1 year or sooner as needed.

## 2023-07-07 LAB
CYTOLOGIST CVX/VAG CYTO: NORMAL
DX ICD CODE: NORMAL
Lab: NORMAL
OTHER STN SPEC: NORMAL
OTHER STN SPEC: NORMAL
PATH REPORT.FINAL DX SPEC: NORMAL
SL AMB NOTE:: NORMAL
SL AMB SPECIMEN ADEQUACY: NORMAL
SL AMB TEST METHODOLOGY: NORMAL

## 2023-08-01 ENCOUNTER — OFFICE VISIT (OUTPATIENT)
Dept: FAMILY MEDICINE CLINIC | Facility: CLINIC | Age: 23
End: 2023-08-01
Payer: COMMERCIAL

## 2023-08-01 VITALS
TEMPERATURE: 99.1 F | WEIGHT: 118.5 LBS | BODY MASS INDEX: 20.23 KG/M2 | SYSTOLIC BLOOD PRESSURE: 114 MMHG | HEART RATE: 63 BPM | DIASTOLIC BLOOD PRESSURE: 64 MMHG | HEIGHT: 64 IN | RESPIRATION RATE: 21 BRPM | OXYGEN SATURATION: 99 %

## 2023-08-01 DIAGNOSIS — N63.12 MASS OF UPPER INNER QUADRANT OF RIGHT BREAST: Primary | ICD-10-CM

## 2023-08-01 PROCEDURE — 99214 OFFICE O/P EST MOD 30 MIN: CPT | Performed by: FAMILY MEDICINE

## 2023-08-01 NOTE — ASSESSMENT & PLAN NOTE
Firm, smooth lump noted in upper inner quadrant of right breast  Per patient gets larger with her menstrual cycle  Lump is painful, sometimes causes chest tightness    · Suspected fibrocystic changes versus costochondritis  · Advised patient to use ibuprofen for chest pain.   If pain resolves with ibuprofen use, likely costochondritis etiology  · Breast ultrasound to rule out fibrocystic changes or other breast etiologies

## 2023-08-01 NOTE — PROGRESS NOTES
[facility] Office visit    Assessment/Plan:     1. Mass of upper inner quadrant of right breast  Assessment & Plan:  Firm, smooth lump noted in upper inner quadrant of right breast  Per patient gets larger with her menstrual cycle  Lump is painful, sometimes causes chest tightness    · Suspected fibrocystic changes versus costochondritis  · Advised patient to use ibuprofen for chest pain. If pain resolves with ibuprofen use, likely costochondritis etiology  · Breast ultrasound to rule out fibrocystic changes or other breast etiologies    Orders:  -     US breast right limited (diagnostic); Future; Expected date: 08/01/2023        Return in about 6 weeks (around 9/12/2023) for after ultrasound . Subjective:   JADIEL Beck is a 25 y.o. female who presents to the office for evaluation of a lump in her right breast.  Patient states that the lump has been present for several weeks, she notices that it gets larger with her periods. Also states that the lump is painful, however denies any discoloration in the area. She is unsure of any breast cancer history in the family. Patient denies any trauma to the breast region. She does note that the lump sometimes causes chest tightness as well. Review of Systems   Constitutional: Negative for chills, diaphoresis, fatigue and fever. HENT: Negative for congestion, sinus pressure, sinus pain and sore throat. Eyes: Negative for pain, redness and itching. Respiratory: Positive for chest tightness. Negative for cough and shortness of breath. Cardiovascular: Negative for chest pain and palpitations. Gastrointestinal: Negative for abdominal pain, constipation, diarrhea, nausea and vomiting. Genitourinary: Negative for difficulty urinating, dysuria, hematuria and vaginal bleeding. Musculoskeletal: Negative for arthralgias, back pain and myalgias. Skin: Negative for color change, pallor and rash.         Lump in right breast Neurological: Negative for dizziness, light-headedness and headaches. All other systems reviewed and are negative. Objective:     /64 (BP Location: Left arm, Patient Position: Sitting, Cuff Size: Child)   Pulse 63   Temp 99.1 °F (37.3 °C) (Temporal)   Resp 21   Ht 5' 4" (1.626 m)   Wt 53.8 kg (118 lb 8 oz)   LMP 07/16/2023 (Exact Date)   SpO2 99%   BMI 20.34 kg/m²      Physical Exam  Vitals reviewed. Constitutional:       General: She is not in acute distress. Appearance: Normal appearance. She is normal weight. Eyes:      General: No scleral icterus. Extraocular Movements: Extraocular movements intact. Conjunctiva/sclera: Conjunctivae normal.   Cardiovascular:      Rate and Rhythm: Normal rate and regular rhythm. Heart sounds: Normal heart sounds. No murmur heard. Pulmonary:      Effort: Pulmonary effort is normal. No respiratory distress. Breath sounds: Normal breath sounds. Chest:   Breasts:     Right: Mass (firm, smooth lump, upper inner quadrant) and tenderness present. Abdominal:      General: Abdomen is flat. Bowel sounds are normal.      Palpations: Abdomen is soft. Tenderness: There is no abdominal tenderness. Musculoskeletal:      Cervical back: Normal range of motion. Right lower leg: No edema. Left lower leg: No edema. Skin:     General: Skin is warm. Neurological:      Mental Status: She is alert and oriented to person, place, and time.    Psychiatric:         Mood and Affect: Mood normal.         Behavior: Behavior normal.          ** Please Note: This note has been constructed using a voice recognition system Isai Rush DO  08/01/23  4:54 PM

## 2023-08-04 ENCOUNTER — TELEPHONE (OUTPATIENT)
Dept: OBGYN CLINIC | Facility: CLINIC | Age: 23
End: 2023-08-04

## 2023-08-04 ENCOUNTER — OFFICE VISIT (OUTPATIENT)
Dept: OBGYN CLINIC | Facility: CLINIC | Age: 23
End: 2023-08-04
Payer: COMMERCIAL

## 2023-08-04 VITALS
SYSTOLIC BLOOD PRESSURE: 102 MMHG | DIASTOLIC BLOOD PRESSURE: 72 MMHG | HEIGHT: 64 IN | BODY MASS INDEX: 21 KG/M2 | WEIGHT: 123 LBS

## 2023-08-04 DIAGNOSIS — Z30.09 BIRTH CONTROL COUNSELING: Primary | ICD-10-CM

## 2023-08-04 PROCEDURE — 99213 OFFICE O/P EST LOW 20 MIN: CPT | Performed by: OBSTETRICS & GYNECOLOGY

## 2023-08-04 NOTE — ASSESSMENT & PLAN NOTE
Contraceptive options were reviewed, including hormonal methods, both combination (pill, patch, vaginal ring) and progesterone-only (pill, Depo Provera and Nexplanon), intrauterine devices (Mirena, Basilia and Paraguard), barrier methods (condoms, diaphragm) and male/female sterilization. The mechanisms, risks, benefits and side effects of all methods were discussed. All questions have been answered to her satisfaction. Patient would like to try Nexplanon implant. We reviewed efficacy, risk of irregular bleeding, and time needed for the body to adjust after placement. Will order after prior authorization and patient will return to office for insertion.

## 2023-08-10 ENCOUNTER — TELEPHONE (OUTPATIENT)
Dept: OBGYN CLINIC | Facility: CLINIC | Age: 23
End: 2023-08-10

## 2023-08-10 NOTE — TELEPHONE ENCOUNTER
----- Message from Otf Santacruz sent at 8/4/2023 10:41 AM EDT -----  Regarding: FW: Pt wants Nexplanon  Will work on prior auth through [employer] appt 10/4/2023    Courtney Speaker  ----- Message -----  From: Gonzalez Alvarez MD  Sent: 8/4/2023  10:16 AM EDT  To: Dhiraj Suarez; #  Subject: Pt wants Nexplanon                               Patient would like Nexplanon. Has [employer]. Please do PA if necessary and schedule for insertion. Thanks!

## 2023-08-10 NOTE — TELEPHONE ENCOUNTER
Submitted prior authorization on [address] [facility] [address] for [address]  ICD: Z30.09 and Z30.46  CPT codes: T2837553 and [id number]  REF # [id number]

## 2023-08-17 ENCOUNTER — TELEPHONE (OUTPATIENT)
Dept: FAMILY MEDICINE CLINIC | Facility: CLINIC | Age: 23
End: 2023-08-17

## 2023-08-17 NOTE — TELEPHONE ENCOUNTER
----- Message from Desiree Glez LPN sent at 3/29/0581  1:48 PM EDT -----  Regarding: RE: Breathing  Contact: [phone]  Could someone call this patient and get some more information? There is no mention of who she referring to. When trying to message her back there is an error message.   Thank you    ----- Message -----  From: Apple Driver  Sent: 8/17/2023  12:10 PM EDT  To: [facility]  Subject: Breathing                                        How would I know if she will have asthma because she is wheezing

## 2023-10-04 ENCOUNTER — PROCEDURE VISIT (OUTPATIENT)
Dept: OBGYN CLINIC | Facility: CLINIC | Age: 23
End: 2023-10-04
Payer: COMMERCIAL

## 2023-10-04 VITALS
DIASTOLIC BLOOD PRESSURE: 78 MMHG | SYSTOLIC BLOOD PRESSURE: 112 MMHG | WEIGHT: 116 LBS | BODY MASS INDEX: 19.81 KG/M2 | HEIGHT: 64 IN

## 2023-10-04 DIAGNOSIS — Z32.02 URINE PREGNANCY TEST NEGATIVE: ICD-10-CM

## 2023-10-04 DIAGNOSIS — Z30.017 NEXPLANON INSERTION: Primary | ICD-10-CM

## 2023-10-04 LAB — SL AMB POCT URINE HCG: NEGATIVE

## 2023-10-04 PROCEDURE — 81025 URINE PREGNANCY TEST: CPT | Performed by: NURSE PRACTITIONER

## 2023-10-04 PROCEDURE — 11981 INSERTION DRUG DLVR IMPLANT: CPT | Performed by: NURSE PRACTITIONER

## 2023-10-04 NOTE — PROGRESS NOTES
Omar Parada 22-year-female presents today for Nexplanon insertion. She was counseled by Dr. Jennifer Terrazas on 8/4/2023. She has no further questions today. Risk and benefits reviewed. Patient's last menstrual period was 09/05/2023 (approximate). Visit Vitals  /78 (BP Location: Left arm, Patient Position: Sitting)   Ht 5' 4" (1.626 m)   Wt 52.6 kg (116 lb)   LMP 09/05/2023 (Approximate)   BMI 19.91 kg/m²   OB Status Having periods   Smoking Status Never   BSA 1.55 m²     Results from last 6 Months   Lab Units 10/04/23  1143   URINE HCG  negative       Universal Protocol:  Consent: Verbal consent obtained. Risks and benefits: risks, benefits and alternatives were discussed  Consent given by: patient  Timeout called at: 10/4/2023 11:44 AM.  Patient understanding: patient states understanding of the procedure being performed  Patient identity confirmed: verbally with patient    Remove and insert drug implant    Date/Time: 10/4/2023 11:44 AM    Performed by: MARTIN Armstrong  Authorized by: MARTIN Armstrong    Indication:     Indication: Insertion of non-biodegradable drug delivery implant    Pre-procedure:     Local anesthetic:  Lidocaine with epinephrine    The site was cleaned and prepped in a sterile fashion: yes    Procedure:     Procedure: Insertion    Left/right:  Left    Preloaded contraceptive capsule trocar was placed subdermally: yes      Visualization of implant was obtained: yes      Contraceptive capsule was inserted and trocar removed: yes      Visualization of notch in stylet and palpation of device: yes      Palpation confirms placement by provider and patient: yes      Site was closed with steri-strips and pressure bandage applied: yes    Comments: Follow-up: The patient tolerated the procedure well without complications. Standard post-procedure care is explained and return  precautions are given. Suzie Varela was seen today for procedure.     Diagnoses and all orders for this visit:    Nexplanon insertion  -     Remove and insert drug implant  -     POCT urine HCG  -     etonogestrel (NEXPLANON) subdermal implant 68 mg    Urine pregnancy test negative      RTO for annual exam in July or sooner if needed.

## 2024-05-04 NOTE — PROGRESS NOTES
Subjective      Davion Cummings is a 25 y.o. female who presents for contraception counseling. Last pap smear: NILM 7/2023  Periods are monthly, every 30 days, lasting 4 days  She denies dysmenorrhea or menorrhagia  She denies past history of STD/STI  She denies any changes in bowel/bladder habits, pelvic pain, bloating, abdominal pain, N/V, changes in appetite, thyroid disease  She denies history of GYN issues  She denies history of diabetes, HTN, migraine with aura, or blood clots  She denies history of smoking  She denies family history or personal history of blood clots or bleeding disorders    Past BC methods: Never  Desires: Nexplanon    Objective      /72 (BP Location: Right arm, Patient Position: Sitting)   Ht 5' 4" (1.626 m)   Wt 55.8 kg (123 lb)   LMP 07/16/2023 (Exact Date)   BMI 21.11 kg/m²   Physical Exam  Vitals reviewed. Constitutional:       General: She is not in acute distress. Appearance: She is not ill-appearing, toxic-appearing or diaphoretic. Cardiovascular:      Rate and Rhythm: Normal rate. Pulmonary:      Effort: Pulmonary effort is normal. No respiratory distress. Abdominal:      Palpations: Abdomen is soft. Skin:     General: Skin is warm and dry. Neurological:      Mental Status: She is alert and oriented to person, place, and time. Mental status is at baseline. Psychiatric:         Mood and Affect: Mood normal.         Behavior: Behavior normal.         Thought Content: Thought content normal.         Judgment: Judgment normal.       Assessment/Plan     25 y.o., starting Nexplanon, no contraindications.      Problem List Items Addressed This Visit        Unprioritized    Birth control counseling - Primary     Contraceptive options were reviewed, including hormonal methods, both combination (pill, patch, vaginal ring) and progesterone-only (pill, Depo Provera and Nexplanon), intrauterine devices (Mirena, Basilia and Paraguard), barrier methods (condoms, diaphragm) and male/female sterilization. The mechanisms, risks, benefits and side effects of all methods were discussed. All questions have been answered to her satisfaction. Patient would like to try Nexplanon implant. We reviewed efficacy, risk of irregular bleeding, and time needed for the body to adjust after placement. Will order after prior authorization and patient will return to office for insertion. Eron Granados.  French Kidd MD  OB/GYN  8/4/2023  10:16 AM 1

## 2024-09-04 ENCOUNTER — ANNUAL EXAM (OUTPATIENT)
Dept: OBGYN CLINIC | Facility: CLINIC | Age: 24
End: 2024-09-04
Payer: COMMERCIAL

## 2024-09-04 VITALS
WEIGHT: 127 LBS | BODY MASS INDEX: 21.8 KG/M2 | HEART RATE: 60 BPM | DIASTOLIC BLOOD PRESSURE: 60 MMHG | SYSTOLIC BLOOD PRESSURE: 100 MMHG | OXYGEN SATURATION: 99 %

## 2024-09-04 DIAGNOSIS — Z01.419 WOMEN'S ANNUAL ROUTINE GYNECOLOGICAL EXAMINATION: Primary | ICD-10-CM

## 2024-09-04 PROCEDURE — 99395 PREV VISIT EST AGE 18-39: CPT | Performed by: NURSE PRACTITIONER

## 2024-09-04 RX ORDER — ETONOGESTREL 68 MG/1
68 IMPLANT SUBCUTANEOUS
COMMUNITY

## 2024-09-04 NOTE — PROGRESS NOTES
Subjective    HPI:     Luiz Palacios is a 23 y.o. female. She is a  2 Para 1, with a . Her menstrual cycles are irregular. Her current method of contraception includes  Nexplanon , due for replacement in 10/2026. She is single and is not sexually active. She denies /GI and Gyn complaints. She feels safe at home. She denies depression/anxiety.  Medical, surgical and family history reviewed. Her dental care is up-to-date. She eats a healthy diet and exercises regularly. She is happy with her weight.     Visit Vitals  /60   Pulse 60   Wt 57.6 kg (127 lb)   LMP 2024   SpO2 99%   BMI 21.80 kg/m²   OB Status Having periods   Smoking Status Never   BSA 1.61 m²       Gynecologic History    Patient's last menstrual period was 2024.  Gardasil Vaccine Series: completed  Last Pap: 23. Results were: normal      Obstetric and Medical History    OB History    Para Term  AB Living   2 1 1 0 1 1   SAB IAB Ectopic Multiple Live Births   0 0 0 0 1      # Outcome Date GA Lbr Lenny/2nd Weight Sex Type Anes PTL Lv   2 Term 22 38w6d / 00:30 3140 g (6 lb 14.8 oz) F Vag-Spont EPI N MACI   1 AB 2021              Complications: Blighted ovum       Past Medical History:   Diagnosis Date    Anemia     Asthma     Gestational diabetes     diet controlled    Seasonal allergies     Varicella        History reviewed. No pertinent surgical history.    The following portions of the patient's history were reviewed and updated as appropriate: allergies, current medications, past family history, past medical history, past social history, past surgical history, and problem list.    Review of Systems    Pertinent items are noted in HPI.      Objective    Physical Exam  Constitutional:       Appearance: Normal appearance. She is well-developed.   Genitourinary:      Vulva, bladder and urethral meatus normal.      No lesions in the vagina.      Right Labia: No rash, tenderness, lesions, skin changes  or Bartholin's cyst.     Left Labia: No tenderness, lesions, skin changes, Bartholin's cyst or rash.     No labial fusion noted.      No inguinal adenopathy present in the right or left side.     No vaginal discharge, erythema, tenderness, bleeding or granulation tissue.      No vaginal prolapse present.     No vaginal atrophy present.       Right Adnexa: not tender, not full and no mass present.     Left Adnexa: not tender, not full and no mass present.     Cervix is parous.      No cervical motion tenderness, discharge, friability, lesion, polyp or nabothian cyst.      Uterus is not enlarged, tender, irregular or prolapsed.      No uterine mass detected.     Uterus is anteverted.      Pelvic exam was performed with patient in the lithotomy position.   Breasts:     Breasts are symmetrical.      Right: No inverted nipple, mass, nipple discharge, skin change or tenderness.      Left: No inverted nipple, mass, nipple discharge, skin change or tenderness.   HENT:      Head: Normocephalic and atraumatic.   Neck:      Thyroid: No thyromegaly.   Cardiovascular:      Rate and Rhythm: Normal rate and regular rhythm.      Heart sounds: Normal heart sounds, S1 normal and S2 normal.   Pulmonary:      Effort: Pulmonary effort is normal.      Breath sounds: Normal breath sounds.   Abdominal:      General: Bowel sounds are normal. There is no distension.      Palpations: Abdomen is soft. There is no mass.      Tenderness: There is no abdominal tenderness. There is no guarding.      Hernia: There is no hernia in the left inguinal area or right inguinal area.   Musculoskeletal:      Cervical back: Neck supple.   Lymphadenopathy:      Cervical: No cervical adenopathy.      Upper Body:      Right upper body: No supraclavicular or axillary adenopathy.      Left upper body: No supraclavicular or axillary adenopathy.      Lower Body: No right inguinal adenopathy. No left inguinal adenopathy.   Neurological:      Mental Status: She is  alert.   Skin:     General: Skin is warm and dry.      Findings: No rash.   Psychiatric:         Attention and Perception: Attention and perception normal.         Mood and Affect: Mood and affect normal.         Speech: Speech normal.         Behavior: Behavior is cooperative.         Thought Content: Thought content normal.         Cognition and Memory: Cognition and memory normal.         Judgment: Judgment normal.   Vitals and nursing note reviewed.          Assessment and Plan    Luiz was seen today for gynecologic exam.    Diagnoses and all orders for this visit:    Women's annual routine gynecological examination      Patient informed of a Stable GYN exam. A pap smear was not performed due to a negative pap smear in 2023     I have discussed the importance of exercise and healthy diet as well as adequate intake of calcium and vitamin D. The current ASCCP guidelines were reviewed. The low risk patient will receive pap smear screening every 3 years until the age of 29 and then every 3 to 5 years with HPV co-testing from the ages of 30-65. I emphasized the importance of an annual pelvic and breast exam. A yearly mammogram is recommended for breast cancer screening starting at age 40.       Results will be released to [facility], if abnormal will call to review and discuss treatment plan.     All questions have been answered to her satisfaction.       Contraception: Nexplanon.  Follow up in: 1 year or sooner if needed.

## 2024-09-16 ENCOUNTER — APPOINTMENT (OUTPATIENT)
Dept: RADIOLOGY | Facility: HOSPITAL | Age: 24
End: 2024-09-16
Payer: COMMERCIAL

## 2024-09-16 ENCOUNTER — HOSPITAL ENCOUNTER (EMERGENCY)
Facility: HOSPITAL | Age: 24
Discharge: HOME | End: 2024-09-16
Attending: STUDENT IN AN ORGANIZED HEALTH CARE EDUCATION/TRAINING PROGRAM
Payer: COMMERCIAL

## 2024-09-16 VITALS
BODY MASS INDEX: 21.68 KG/M2 | HEART RATE: 84 BPM | DIASTOLIC BLOOD PRESSURE: 93 MMHG | OXYGEN SATURATION: 99 % | TEMPERATURE: 97.6 F | RESPIRATION RATE: 18 BRPM | WEIGHT: 127 LBS | SYSTOLIC BLOOD PRESSURE: 121 MMHG | HEIGHT: 64 IN

## 2024-09-16 DIAGNOSIS — M79.643 TENDERNESS OF ANATOMICAL SNUFFBOX: ICD-10-CM

## 2024-09-16 DIAGNOSIS — M79.644 THUMB PAIN, RIGHT: Primary | ICD-10-CM

## 2024-09-16 PROCEDURE — 99283 EMERGENCY DEPT VISIT LOW MDM: CPT

## 2024-09-16 PROCEDURE — 29130 APPL FINGER SPLINT STATIC: CPT | Performed by: STUDENT IN AN ORGANIZED HEALTH CARE EDUCATION/TRAINING PROGRAM

## 2024-09-16 PROCEDURE — 99284 EMERGENCY DEPT VISIT MOD MDM: CPT | Performed by: STUDENT IN AN ORGANIZED HEALTH CARE EDUCATION/TRAINING PROGRAM

## 2024-09-16 PROCEDURE — 73130 X-RAY EXAM OF HAND: CPT

## 2024-09-16 NOTE — DISCHARGE INSTRUCTIONS
You were seen in the emergency department for thumb pain.  You were placed in a splint.  As discussed, given where you are tender it is recommended you follow-up with orthopedics.  A referral was sent.  Please call soon as possible to schedule an appointment.  Return to the emergency room for any worsening pain, trouble moving your thumb, or for any other new or concerning symptoms.

## 2024-09-17 ENCOUNTER — TELEPHONE (OUTPATIENT)
Age: 24
End: 2024-09-17

## 2024-09-17 NOTE — TELEPHONE ENCOUNTER
Attempted Contacting Patient regarding recent ED Visit on 9/17/24.    Unable to reach patient, busy signal will try back    ED:Dave  CC:Thumb Pain   DX:Thumb Pain right; Tenderness of anatomical snuffbox  Time:4:37pm   Last OV: 9/5/23

## 2024-09-17 NOTE — ED PROVIDER NOTES
1. Thumb pain, right    2. Tenderness of anatomical snuffbox      ED Disposition       ED Disposition   Discharge    Condition   Stable    Date/Time   Mon Sep 16, 2024  5:36 PM    Comment   Luiz Palacios discharge to home/self care.                   Assessment & Plan       Medical Decision Making  Amount and/or Complexity of Data Reviewed  Radiology: ordered and independent interpretation performed.      Patient is a 23 y.o. female who presents to the ED for right thumb pain..  Patient is nontoxic, well-appearing.     Differential includes but is not limited to: Fracture, dislocation, contusion.    Plan: X-rays obtained which did not show any acute osseous abnormality.  However, given anatomical snuffbox tenderness will place in thumb spica splint and discharge with orthopedic follow-up.  Discussed plan with patient.  She is in agreement.  Return precautions discussed.                              Medications - No data to display    History of Present Illness       HPI    Pt is a 23 Y F who presents to the ED for R thumb pain. R hand dominant.  Was doing a flip when she accidentally landed on her right thumb.  Unsure exactly what happened but felt a pop.  Now presents for further evaluation.  Is able to move her thumb.  Denies any other injuries.  Has some mild tingling but no numbness.  No other complaints or concerns.    Review of Systems   Musculoskeletal:         Right thumb pain   All other systems reviewed and are negative.          Objective     ED Triage Vitals   Temperature Pulse Blood Pressure Respirations SpO2 Patient Position - Orthostatic VS   09/16/24 1641 09/16/24 1642 09/16/24 1642 09/16/24 1641 09/16/24 1642 --   97.6 °F (36.4 °C) 84 121/93 18 99 %       Temp src Heart Rate Source BP Location FiO2 (%) Pain Score    -- -- -- -- 09/16/24 1641        6        Physical Exam  Vitals and nursing note reviewed.   Constitutional:       General: She is not in acute distress.     Appearance: She is  "well-developed. She is not ill-appearing, toxic-appearing or diaphoretic.   HENT:      Head: Normocephalic and atraumatic.      Right Ear: External ear normal.      Left Ear: External ear normal.      Nose: Nose normal.   Eyes:      General: Lids are normal. No scleral icterus.  Cardiovascular:      Rate and Rhythm: Normal rate and regular rhythm.   Pulmonary:      Effort: Pulmonary effort is normal. No respiratory distress.   Musculoskeletal:         General: No deformity. Normal range of motion.      Cervical back: Normal range of motion and neck supple.      Comments: Right anatomical snuffbox tenderness.  There is also tenderness at the base of the right thumb.  Patient able to oppose thumb without difficulty.  Able to flex and extend thumb against resistance.  2+ radial pulse.   Skin:     General: Skin is warm and dry.   Neurological:      General: No focal deficit present.      Mental Status: She is alert.   Psychiatric:         Mood and Affect: Mood normal.         Behavior: Behavior normal.         Labs Reviewed - No data to display  XR hand 3+ views RIGHT   ED Interpretation by Gerardo Lackey DO (09/16 1735)   No acute osseous abnormality          Orthopedic injury treatment    Date/Time: 9/16/2024 5:45 PM    Performed by: Gerardo Lackey DO  Authorized by: Gerardo Lackey DO    Patient Location:  ED  [place] Protocol:  procedure performed by consultantConsent: Verbal consent obtained.  Risks and benefits: risks, benefits and alternatives were discussed  Consent given by: patient  Time out: Immediately prior to procedure a \"time out\" was called to verify the correct patient, procedure, equipment, support staff and site/side marked as required.  Timeout called at: 9/16/2024 5:45 PM.  Radiology Images displayed and confirmed. If images not available, report reviewed: imaging studies available  Required items: required blood products, implants, devices, and special equipment available  Patient " identity confirmed: hospital-assigned identification number    Injury location:  Finger  Location details:  Right thumb  Injury type:  Soft tissue  Neurovascular status: Neurovascularly intact    Neurological function: normal    Range of motion: normal    Local anesthesia used?: No    General anesthesia used?: No    Skeletal traction used?: No    Immobilization:  Splint  Splint type:  Thumb spica  Supplies used:  Ortho-Glass  Neurovascular status: Neurovascularly intact    Distal perfusion: normal    Neurological function: normal    Range of motion: normal    Patient tolerance:  Patient tolerated the procedure well with no immediate complications         Gerardo Lackey,   09/16/24 1121

## 2024-09-23 ENCOUNTER — APPOINTMENT (OUTPATIENT)
Dept: RADIOLOGY | Facility: CLINIC | Age: 24
End: 2024-09-23
Payer: COMMERCIAL

## 2024-09-23 ENCOUNTER — OFFICE VISIT (OUTPATIENT)
Dept: OBGYN CLINIC | Facility: CLINIC | Age: 24
End: 2024-09-23
Payer: COMMERCIAL

## 2024-09-23 VITALS
WEIGHT: 126 LBS | BODY MASS INDEX: 21.51 KG/M2 | SYSTOLIC BLOOD PRESSURE: 116 MMHG | DIASTOLIC BLOOD PRESSURE: 78 MMHG | HEART RATE: 82 BPM | HEIGHT: 64 IN

## 2024-09-23 DIAGNOSIS — M25.531 RIGHT WRIST PAIN: ICD-10-CM

## 2024-09-23 DIAGNOSIS — S69.90XA THUMB INJURY, INITIAL ENCOUNTER: ICD-10-CM

## 2024-09-23 DIAGNOSIS — S63.642A SPRAIN OF METACARPOPHALANGEAL (MCP) JOINT OF LEFT THUMB, INITIAL ENCOUNTER: Primary | ICD-10-CM

## 2024-09-23 PROCEDURE — 73110 X-RAY EXAM OF WRIST: CPT

## 2024-09-23 PROCEDURE — 99204 OFFICE O/P NEW MOD 45 MIN: CPT | Performed by: STUDENT IN AN ORGANIZED HEALTH CARE EDUCATION/TRAINING PROGRAM

## 2024-09-23 NOTE — PROGRESS NOTES
ORTHOPAEDIC HAND, WRIST, AND ELBOW OFFICE  VISIT     ASSESSMENT/PLAN:    Luiz Palacios is a 23 y.o. RHD female who presents with left thumb UCL sprain    Discussed with patient that overall xrays demonstrate no acute fracture. Overall clinical exam of thumb is benign with no instability. We will plan to treat patient with 2 weeks of thumb spica splint immobilization for soft tissue rest. Educated to maintain limited WB to extremity. OK to remove splint to shower, otherwise keep on 24/7. Will follow up in 2 weeks for repeat xray and clinical eval.    The patient verbalized understanding of exam findings and treatment plan. We engaged in the shared decision-making process and treatment options were discussed at length with the patient. Surgical and conservative management discussed today along with risks and benefits.    Follow Up:  2 weeks for repeat clinical eval        ____________________________________________________________________________________________________________________________________________      CHIEF COMPLAINT:  No chief complaint on file.      SUBJECTIVE:  Luiz Palacios is a 23 y.o. year old RHD female who presents for initial evaluation of right thumb pain. Patient injured her hand 9/16/2024 when doing a flip. She was seen in ED, x-rays did not demonstrate a fracture but due to concern for snuff box tenderness patient was splinted, which she presents in today.  On evaluation patient has no pain about her snuffbox. Complaining of pain primarily at the base of her right thumb. She states its improved significantly since initial injury. Overall 2/10 pain, worse with motion and pinching, improves with rest. Denies numbness or tingling to extremity. No other complaints.      Pain/symptom timing:  Worse during the day when active  Pain/symptom context:  Worse with activites and work  Pain/symptom modifying factors:  Rest makes better, activities make worse  Pain/symptom associated  signs/symptoms: none    Prior treatment   NSAIDsYes   Injections No   Bracing/Orthotics Yes    Physical Therapy No     I have personally reviewed all the relevant PMH, PSH, SH, FH, Medications and allergies      PAST MEDICAL HISTORY:  Past Medical History:   Diagnosis Date    Anemia     Asthma     Gestational diabetes     diet controlled    Seasonal allergies     Varicella        PAST SURGICAL HISTORY:  No past surgical history on file.    FAMILY HISTORY:  Family History   Problem Relation Age of Onset    Diabetes type II Mother        SOCIAL HISTORY:  Social History     Tobacco Use    Smoking status: Never    Smokeless tobacco: Never   Vaping Use    Vaping status: Never Used   Substance Use Topics    Alcohol use: No    Drug use: Yes     Types: Marijuana       MEDICATIONS:    Current Outpatient Medications:     etonogestrel (Nexplanon) subdermal implant, 68 mg by Subdermal route Once every 3 years, Disp: , Rfl:     ALLERGIES:  Allergies   Allergen Reactions    Mixed Ragweed Nasal Congestion    Pollen Extract Nasal Congestion    Pollen Extract Sneezing and Eye Swelling           REVIEW OF SYSTEMS:  Review of Systems  Review of Systems   Constitutional:  Negative for chills and fever.   HENT:  Negative for ear pain and sore throat.    Eyes:  Negative for pain and visual disturbance.   Respiratory:  Negative for cough and shortness of breath.    Cardiovascular:  Negative for chest pain and palpitations.   Gastrointestinal:  Negative for abdominal pain and vomiting.   Genitourinary:  Negative for dysuria and hematuria.   Musculoskeletal:  Negative for arthralgias and back pain.   Skin:  Negative for color change and rash.   Neurological:  Negative for seizures and syncope.   All other systems reviewed and are negative.    VITALS:  There were no vitals filed for this visit.    LABS:      _____________________________________________________  PHYSICAL EXAMINATION:  General: well developed and well nourished, alert,  oriented times 3, and appears comfortable  Psychiatric: Normal  HEENT: Normocephalic, Atraumatic Trachea Midline, No torticollis  Pulmonary: No audible wheezing or respiratory distress   Abdomen/GI: Non tender, non distended   Cardiovascular: No pitting edema, 2+ radial pulse   Skin: No masses, erythema, lacerations, fluctation, ulcerations  Neurovascular: Sensation Intact to the Median, Ulnar, Radial Nerve, Motor Intact to the Median, Ulnar, Radial Nerve, and Pulses Intact  Musculoskeletal: Normal, except as noted in detailed exam and in HPI.        FOCUSED MUSCULOSKELETAL EXAMINATION:  Right Upper Extremity  Inspection: skin intact, no notable deformity   Palpation:  TTP  ulnar aspect of MCP joint, no palpable stenner lesion  Neurologic:  5/5 elbow flexion, 5/5 elbow extension, 5/5 wrist extension, 5/5 wrist flexion, 5/5 finger flexion, 5/5 finger extension, 5/5 FPL, 5/5 EPL, 5/5 APB, 5/5 intrinsics, sensation intact to median, radial, and ulnar nerve distributions  Vascular: Palpable radial pulse, brisk cap refill <2sec, hand warm and well perfused  MSK: full painless active and passive ROM    Thumb is stable to stress on exam at both 0 and 30 degrees and equal to contralateral side.  Skin is warm and dry to touch with no signs of erythema, ecchymosis, or infection  No soft tissue swelling or effusion noted  Full FDS, FDP, extensor mechanisms are intact  No rotational deformity with composite finger flexion  Demonstrates normal wrist, elbow, and shoulder motion  Forearm compartments are soft and supple  2+ distal radial pulse with brisk capillary refill to the fingers  Radial, median, and ulnar motor and sensory distribution intact  Sensations light to touch intact distally      ___________________________________________________  STUDIES REVIEWED:  Xrays of the right wrist were obtained on 9/23/24 were independently reviewed which demonstrates no acute osseous abnormalities     LABS REVIEWED:    HgA1c: No  "results found for: \"HGBA1C\"  BMP:   Lab Results   Component Value Date    GLUCOSE 187 (H) 10/19/2022    GLUCOSE 165 (H) 10/19/2022    CALCIUM 9.6 12/24/2022    K 3.8 12/24/2022    CO2 23 12/24/2022     12/24/2022    BUN 5 12/24/2022    CREATININE 0.62 12/24/2022               PROCEDURES PERFORMED:  Procedures  No Procedures performed today    _____________________________________________________      Scribe Attestation      I,:   am acting as a scribe while in the presence of the attending physician.:       I,:   personally performed the services described in this documentation    as scribed in my presence.:               I agree with the history, physical examination, assessment and plan of care as documented above.    Shaw Martins M.D.  Attending, Orthopaedic Surgery  Hand, Wrist, and Elbow Surgery  [facility]     "

## 2024-10-07 ENCOUNTER — OFFICE VISIT (OUTPATIENT)
Dept: OBGYN CLINIC | Facility: CLINIC | Age: 24
End: 2024-10-07
Payer: COMMERCIAL

## 2024-10-07 VITALS
HEART RATE: 92 BPM | HEIGHT: 64 IN | BODY MASS INDEX: 21.51 KG/M2 | DIASTOLIC BLOOD PRESSURE: 83 MMHG | WEIGHT: 126 LBS | SYSTOLIC BLOOD PRESSURE: 122 MMHG

## 2024-10-07 DIAGNOSIS — S63.641D SPRAIN OF METACARPOPHALANGEAL (MCP) JOINT OF RIGHT THUMB, SUBSEQUENT ENCOUNTER: Primary | ICD-10-CM

## 2024-10-07 PROCEDURE — 99213 OFFICE O/P EST LOW 20 MIN: CPT | Performed by: STUDENT IN AN ORGANIZED HEALTH CARE EDUCATION/TRAINING PROGRAM

## 2024-10-07 NOTE — PROGRESS NOTES
ORTHOPAEDIC HAND, WRIST, AND ELBOW OFFICE  VISIT     ASSESSMENT/PLAN:    Luiz Palacios is a 23 y.o. RHD female who presents with right thumb UCL sprain    -Xrays reviewed demonstrate no acute injury. Clinically patient pain improved, with no instability at the MCP joint. Educated on gradual return to activities as tolerated.    The patient verbalized understanding of exam findings and treatment plan. We engaged in the shared decision-making process and treatment options were discussed at length with the patient. Surgical and conservative management discussed today along with risks and benefits.    Follow Up:  As needed        ____________________________________________________________________________________________________________________________________________      CHIEF COMPLAINT:  Right thumb pain      SUBJECTIVE:  Luiz Palacios is a 23 y.o. year old RHD female who presents for follow up of her right thumb. She was seen two weeks prior in which she was identified to have sprain of her right MCP joint. She was instructed on immobilization and activity modification. She presents today for repeat evaluation.  Still minor discomfort, but overall improved since prior visit. Denies numbness or tingling to extremity. No new complaints.       I have personally reviewed all the relevant PMH, PSH, SH, FH, Medications and allergies      PAST MEDICAL HISTORY:  Past Medical History:   Diagnosis Date    Anemia     Asthma     Gestational diabetes     diet controlled    Seasonal allergies     Varicella        PAST SURGICAL HISTORY:  No past surgical history on file.    FAMILY HISTORY:  Family History   Problem Relation Age of Onset    Diabetes type II Mother        SOCIAL HISTORY:  Social History     Tobacco Use    Smoking status: Never    Smokeless tobacco: Never   Vaping Use    Vaping status: Never Used   Substance Use Topics    Alcohol use: No    Drug use: Yes     Types: Marijuana       MEDICATIONS:    Current  Outpatient Medications:     etonogestrel (Nexplanon) subdermal implant, 68 mg by Subdermal route Once every 3 years, Disp: , Rfl:     ALLERGIES:  Allergies   Allergen Reactions    Mixed Ragweed Nasal Congestion    Pollen Extract Nasal Congestion    Pollen Extract Sneezing and Eye Swelling           REVIEW OF SYSTEMS:  Review of Systems  Review of Systems   Constitutional:  Negative for chills and fever.   HENT:  Negative for ear pain and sore throat.    Eyes:  Negative for pain and visual disturbance.   Respiratory:  Negative for cough and shortness of breath.    Cardiovascular:  Negative for chest pain and palpitations.   Gastrointestinal:  Negative for abdominal pain and vomiting.   Genitourinary:  Negative for dysuria and hematuria.   Musculoskeletal:  Negative for arthralgias and back pain.   Skin:  Negative for color change and rash.   Neurological:  Negative for seizures and syncope.   All other systems reviewed and are negative.    VITALS:  There were no vitals filed for this visit.    LABS:      _____________________________________________________  PHYSICAL EXAMINATION:  General: well developed and well nourished, alert, oriented times 3, and appears comfortable  Psychiatric: Normal  HEENT: Normocephalic, Atraumatic Trachea Midline, No torticollis  Pulmonary: No audible wheezing or respiratory distress   Abdomen/GI: Non tender, non distended   Cardiovascular: No pitting edema, 2+ radial pulse   Skin: No masses, erythema, lacerations, fluctation, ulcerations  Neurovascular: Sensation Intact to the Median, Ulnar, Radial Nerve, Motor Intact to the Median, Ulnar, Radial Nerve, and Pulses Intact  Musculoskeletal: Normal, except as noted in detailed exam and in HPI.        FOCUSED MUSCULOSKELETAL EXAMINATION:  Right Upper Extremity  Inspection: skin intact, no notable deformity   Palpation:  NO ttp at  ulnar aspect of MCP joint, no palpable stenner lesion  Neurologic:  5/5 elbow flexion, 5/5 elbow extension, 5/5  "wrist extension, 5/5 wrist flexion, 5/5 finger flexion, 5/5 finger extension, 5/5 FPL, 5/5 EPL, 5/5 APB, 5/5 intrinsics, sensation intact to median, radial, and ulnar nerve distributions  Vascular: Palpable radial pulse, brisk cap refill <2sec, hand warm and well perfused  MSK: full painless active and passive ROM    Thumb is stable to stress examination at MCP joint at both 0 and 30 degrees and equal to contralateral side.        ___________________________________________________  STUDIES REVIEWED:  Xrays of the right wrist were obtained on 9/23/24 were independently reviewed which demonstrates no acute osseous abnormalities     Xrays of the right thumb were obtained on 10/7/24 were independently reviewed which demonstrates no acute osseous abnormalities     LABS REVIEWED:    HgA1c: No results found for: \"HGBA1C\"  BMP:   Lab Results   Component Value Date    GLUCOSE 187 (H) 10/19/2022    GLUCOSE 165 (H) 10/19/2022    CALCIUM 9.6 12/24/2022    K 3.8 12/24/2022    CO2 23 12/24/2022     12/24/2022    BUN 5 12/24/2022    CREATININE 0.62 12/24/2022               PROCEDURES PERFORMED:        I agree with the history, physical examination, assessment and plan of care as documented above.    Shaw Martins M.D.  Attending, Orthopaedic Surgery  Hand, Wrist, and Elbow Surgery  [facility]     "

## 2024-10-20 ENCOUNTER — HOSPITAL ENCOUNTER (EMERGENCY)
Facility: HOSPITAL | Age: 24
Discharge: HOME | End: 2024-10-20
Attending: EMERGENCY MEDICINE
Payer: COMMERCIAL

## 2024-10-20 VITALS
BODY MASS INDEX: 21.94 KG/M2 | SYSTOLIC BLOOD PRESSURE: 118 MMHG | HEART RATE: 56 BPM | OXYGEN SATURATION: 99 % | TEMPERATURE: 96.6 F | RESPIRATION RATE: 20 BRPM | WEIGHT: 127.8 LBS | DIASTOLIC BLOOD PRESSURE: 70 MMHG

## 2024-10-20 DIAGNOSIS — H93.12 TINNITUS OF LEFT EAR: Primary | ICD-10-CM

## 2024-10-20 PROCEDURE — 99284 EMERGENCY DEPT VISIT MOD MDM: CPT | Performed by: EMERGENCY MEDICINE

## 2024-10-20 PROCEDURE — 99283 EMERGENCY DEPT VISIT LOW MDM: CPT

## 2024-10-20 RX ORDER — PREDNISONE 10 MG/1
TABLET ORAL
Qty: 30 TABLET | Refills: 0 | Status: SHIPPED | OUTPATIENT
Start: 2024-10-20

## 2024-10-20 NOTE — ED PROVIDER NOTES
"Time reflects when diagnosis was documented in both MDM as applicable and the Disposition within this note       Time User Action Codes Description Comment    10/20/2024  3:22 PM Alyson Olsen Add [H93.12] Tinnitus of left ear           ED Disposition       ED Disposition   Discharge    Condition   Stable    Date/Time   Sun Oct 20, 2024  3:22 PM    Comment   Luiz Palacios discharge to home/self care.                   Assessment & Plan       Medical Decision Making  Up to Date reviewed.  Exam normal, neurologic exam normal.  Discussed with ENT on call who advised referral for audiology testing.  I will try course of steroid.      Risk  Prescription drug management.             Medications - No data to display    ED Risk Strat Scores                           SBIRT 22yo+      Flowsheet Row Most Recent Value   Initial Alcohol Screen: US AUDIT-C     1. How often do you have a drink containing alcohol? 0 Filed at: 10/20/2024 1513   2. How many drinks containing alcohol do you have on a typical day you are drinking?  0 Filed at: 10/20/2024 1513   3a. Male UNDER 65: How often do you have five or more drinks on one occasion? 0 Filed at: 10/20/2024 1513   3b. FEMALE Any Age, or MALE 65+: How often do you have 4 or more drinks on one occassion? 0 Filed at: 10/20/2024 1513   Audit-C Score 0 Filed at: 10/20/2024 1513   KATI: How many times in the past year have you...    Used an illegal drug or used a prescription medication for non-medical reasons? Never Filed at: 10/20/2024 1513                            History of Present Illness       Chief Complaint   Patient presents with    Tinnitus     Started with \" ringing in my left ear 4 days ago \". No ear pain, no headache, no trauma       Past Medical History:   Diagnosis Date    Anemia     Asthma     Gestational diabetes     diet controlled    Seasonal allergies     Varicella       History reviewed. No pertinent surgical history.   Family History   Problem Relation Age of " Onset    Diabetes type II Mother       Social History     Tobacco Use    Smoking status: Never    Smokeless tobacco: Never   Vaping Use    Vaping status: Never Used   Substance Use Topics    Alcohol use: No    Drug use: Yes     Types: Marijuana      E-Cigarette/Vaping    E-Cigarette Use Never User       E-Cigarette/Vaping Substances    Nicotine Yes     THC No     CBD No     Flavoring Yes     Other No     Unknown No       I have reviewed and agree with the history as documented.     24 yo female c/o ringing in left ear x 4 days.  She describes it as a humming. It is off and on and worse with movement.  No dizziness, numbness, ear pain.  No trouble walking or talking.  No fever, cough, congestion, nausea, vomiting.  She did have a cold 2 weeks ago.  No trauma.  No hearing loss but says hearing feels different on that side. No meds/aspirin - just on her usual birth control.      History provided by:  Patient   used: No        Review of Systems   Constitutional: Negative.  Negative for fever.   HENT:  Positive for tinnitus. Negative for congestion, ear discharge, ear pain, hearing loss and sore throat.    Eyes: Negative.    Respiratory: Negative.  Negative for cough and shortness of breath.    Cardiovascular: Negative.  Negative for chest pain.   Gastrointestinal: Negative.  Negative for abdominal pain, diarrhea, nausea and vomiting.   Genitourinary: Negative.  Negative for dysuria and flank pain.   Musculoskeletal: Negative.  Negative for back pain and myalgias.   Skin: Negative.  Negative for rash.   Neurological: Negative.  Negative for dizziness, weakness, numbness and headaches.   Hematological:  Does not bruise/bleed easily.   Psychiatric/Behavioral: Negative.     All other systems reviewed and are negative.          Objective       ED Triage Vitals [10/20/24 1434]   Temperature Pulse Blood Pressure Respirations SpO2 Patient Position - Orthostatic VS   (!) 96.6 °F (35.9 °C) 56 118/70 20 99 %  Sitting      Temp Source Heart Rate Source BP Location FiO2 (%) Pain Score    Tympanic Monitor Right arm -- No Pain      Vitals      Date and Time Temp Pulse SpO2 Resp BP Pain Score FACES Pain Rating User   10/20/24 1434 96.6 °F (35.9 °C) 56 99 % 20 118/70 No Pain -- SW            Physical Exam  Vitals and nursing note reviewed.   Constitutional:       General: She is not in acute distress.     Appearance: She is well-developed. She is not ill-appearing or diaphoretic.   HENT:      Head: Normocephalic and atraumatic.      Right Ear: Tympanic membrane and ear canal normal.      Left Ear: Tympanic membrane and ear canal normal.      Mouth/Throat:      Mouth: Mucous membranes are moist.      Pharynx: Oropharynx is clear.   Eyes:      Extraocular Movements: Extraocular movements intact.      Conjunctiva/sclera: Conjunctivae normal.      Pupils: Pupils are equal, round, and reactive to light.   Neck:      Vascular: No carotid bruit.   Cardiovascular:      Rate and Rhythm: Normal rate and regular rhythm.      Heart sounds: Normal heart sounds. No murmur heard.  Pulmonary:      Effort: Pulmonary effort is normal. No respiratory distress.      Breath sounds: Normal breath sounds.   Abdominal:      General: Bowel sounds are normal. There is no distension.      Palpations: Abdomen is soft.      Tenderness: There is no abdominal tenderness.   Musculoskeletal:         General: No deformity. Normal range of motion.      Cervical back: Normal range of motion and neck supple.      Right lower leg: No edema.      Left lower leg: No edema.   Lymphadenopathy:      Cervical: No cervical adenopathy.   Skin:     General: Skin is warm and dry.      Coloration: Skin is not pale.      Findings: No rash.   Neurological:      General: No focal deficit present.      Mental Status: She is alert and oriented to person, place, and time.      Cranial Nerves: No cranial nerve deficit.      Motor: No weakness.      Coordination: Coordination  normal.   Psychiatric:         Mood and Affect: Mood normal.         Behavior: Behavior normal.         Results Reviewed       None            No orders to display       Procedures    ED Medication and Procedure Management   Prior to Admission Medications   Prescriptions Last Dose Informant Patient Reported? Taking?   etonogestrel (Nexplanon) subdermal implant  Self Yes No   Si mg by Subdermal route Once every 3 years      Facility-Administered Medications: None     Patient's Medications   Discharge Prescriptions    PREDNISONE 10 MG TABLET    5 po daily x2 days, then 4 po daily x2 days, then 3 po daily x2 days, then 2 po daily x2 days,then 1 po daily x2days       Start Date: 10/20/2024End Date: --       Order Dose: --       Quantity: 30 tablet    Refills: 0       ED SEPSIS DOCUMENTATION   Time reflects when diagnosis was documented in both MDM as applicable and the Disposition within this note       Time User Action Codes Description Comment    10/20/2024  3:22 PM Alyson Olsen Add [H93.12] Tinnitus of left ear                  Alyson Olsen MD  10/20/24 1526

## 2024-10-20 NOTE — DISCHARGE INSTRUCTIONS
Take the course of the steroid as prescribed and follow up with the ENT specialist for further evaluation.

## 2024-10-23 ENCOUNTER — TELEPHONE (OUTPATIENT)
Age: 24
End: 2024-10-23

## 2024-10-23 NOTE — TELEPHONE ENCOUNTER
Attempted Contacting Patient regarding recent ED Visit on 2:18p,    Unable to contact Patient, busy signal    ED:Dave  CC: Tinnitus   DX:Tinnitus of left ear  Time:  2:18pm  Last OV: